# Patient Record
Sex: FEMALE | Race: WHITE | NOT HISPANIC OR LATINO
[De-identification: names, ages, dates, MRNs, and addresses within clinical notes are randomized per-mention and may not be internally consistent; named-entity substitution may affect disease eponyms.]

---

## 2017-03-13 ENCOUNTER — MEDICATION RENEWAL (OUTPATIENT)
Age: 82
End: 2017-03-13

## 2017-04-04 ENCOUNTER — APPOINTMENT (OUTPATIENT)
Dept: PULMONOLOGY | Facility: CLINIC | Age: 82
End: 2017-04-04

## 2017-04-04 VITALS
SYSTOLIC BLOOD PRESSURE: 122 MMHG | WEIGHT: 183 LBS | RESPIRATION RATE: 14 BRPM | OXYGEN SATURATION: 99 % | DIASTOLIC BLOOD PRESSURE: 60 MMHG | BODY MASS INDEX: 31.41 KG/M2

## 2017-04-26 ENCOUNTER — FORM ENCOUNTER (OUTPATIENT)
Age: 82
End: 2017-04-26

## 2017-04-27 ENCOUNTER — OUTPATIENT (OUTPATIENT)
Dept: OUTPATIENT SERVICES | Facility: HOSPITAL | Age: 82
LOS: 1 days | End: 2017-04-27
Payer: MEDICARE

## 2017-04-27 DIAGNOSIS — R06.02 SHORTNESS OF BREATH: ICD-10-CM

## 2017-04-27 PROCEDURE — A9567: CPT

## 2017-04-27 PROCEDURE — 78582 LUNG VENTILAT&PERFUS IMAGING: CPT | Mod: 26

## 2017-04-27 PROCEDURE — 93970 EXTREMITY STUDY: CPT | Mod: 26

## 2017-04-27 PROCEDURE — 71045 X-RAY EXAM CHEST 1 VIEW: CPT

## 2017-04-27 PROCEDURE — A9540: CPT

## 2017-04-27 PROCEDURE — 71010: CPT | Mod: 26

## 2017-04-27 PROCEDURE — 93970 EXTREMITY STUDY: CPT

## 2017-04-27 PROCEDURE — 78582 LUNG VENTILAT&PERFUS IMAGING: CPT

## 2017-07-11 ENCOUNTER — APPOINTMENT (OUTPATIENT)
Dept: PULMONOLOGY | Facility: CLINIC | Age: 82
End: 2017-07-11

## 2017-07-11 VITALS — OXYGEN SATURATION: 98 % | SYSTOLIC BLOOD PRESSURE: 122 MMHG | DIASTOLIC BLOOD PRESSURE: 60 MMHG | HEART RATE: 87 BPM

## 2017-07-11 VITALS — WEIGHT: 178 LBS | BODY MASS INDEX: 30.55 KG/M2

## 2018-01-09 ENCOUNTER — APPOINTMENT (OUTPATIENT)
Dept: PULMONOLOGY | Facility: CLINIC | Age: 83
End: 2018-01-09
Payer: MEDICARE

## 2018-01-09 VITALS
DIASTOLIC BLOOD PRESSURE: 78 MMHG | SYSTOLIC BLOOD PRESSURE: 118 MMHG | RESPIRATION RATE: 16 BRPM | HEART RATE: 91 BPM | OXYGEN SATURATION: 98 %

## 2018-01-09 DIAGNOSIS — I50.42 CHRONIC COMBINED SYSTOLIC (CONGESTIVE) AND DIASTOLIC (CONGESTIVE) HEART FAILURE: ICD-10-CM

## 2018-01-09 DIAGNOSIS — Z86.79 PERSONAL HISTORY OF OTHER DISEASES OF THE CIRCULATORY SYSTEM: ICD-10-CM

## 2018-01-09 PROCEDURE — 94010 BREATHING CAPACITY TEST: CPT

## 2018-01-09 PROCEDURE — 99215 OFFICE O/P EST HI 40 MIN: CPT | Mod: 25

## 2018-01-12 ENCOUNTER — OUTPATIENT (OUTPATIENT)
Dept: OUTPATIENT SERVICES | Facility: HOSPITAL | Age: 83
LOS: 1 days | End: 2018-01-12

## 2018-01-12 DIAGNOSIS — R91.1 SOLITARY PULMONARY NODULE: ICD-10-CM

## 2018-01-19 ENCOUNTER — APPOINTMENT (OUTPATIENT)
Dept: NUCLEAR MEDICINE | Facility: CLINIC | Age: 83
End: 2018-01-19
Payer: MEDICARE

## 2018-01-19 ENCOUNTER — OUTPATIENT (OUTPATIENT)
Dept: OUTPATIENT SERVICES | Facility: HOSPITAL | Age: 83
LOS: 1 days | End: 2018-01-19

## 2018-01-19 DIAGNOSIS — Z00.8 ENCOUNTER FOR OTHER GENERAL EXAMINATION: ICD-10-CM

## 2018-01-19 PROCEDURE — 78815 PET IMAGE W/CT SKULL-THIGH: CPT | Mod: 26,PI

## 2018-01-23 ENCOUNTER — OTHER (OUTPATIENT)
Age: 83
End: 2018-01-23

## 2018-04-05 ENCOUNTER — APPOINTMENT (OUTPATIENT)
Dept: PULMONOLOGY | Facility: CLINIC | Age: 83
End: 2018-04-05
Payer: MEDICARE

## 2018-04-05 VITALS — SYSTOLIC BLOOD PRESSURE: 120 MMHG | OXYGEN SATURATION: 95 % | DIASTOLIC BLOOD PRESSURE: 60 MMHG | HEART RATE: 63 BPM

## 2018-04-05 VITALS — BODY MASS INDEX: 30.64 KG/M2 | WEIGHT: 178.5 LBS

## 2018-04-05 PROCEDURE — 99215 OFFICE O/P EST HI 40 MIN: CPT | Mod: 25

## 2018-04-05 PROCEDURE — 94010 BREATHING CAPACITY TEST: CPT

## 2018-04-05 RX ORDER — DILTIAZEM HYDROCHLORIDE 180 MG/1
180 CAPSULE, EXTENDED RELEASE ORAL
Qty: 90 | Refills: 0 | Status: ACTIVE | COMMUNITY
Start: 2017-05-08

## 2018-04-05 RX ORDER — LATANOPROST/PF 0.005 %
0.01 DROPS OPHTHALMIC (EYE)
Qty: 75 | Refills: 0 | Status: ACTIVE | COMMUNITY
Start: 2017-10-19

## 2018-05-29 ENCOUNTER — EMERGENCY (EMERGENCY)
Facility: HOSPITAL | Age: 83
LOS: 1 days | Discharge: HOME | End: 2018-05-29
Attending: EMERGENCY MEDICINE
Payer: MEDICARE

## 2018-05-29 VITALS
RESPIRATION RATE: 20 BRPM | TEMPERATURE: 99 F | HEART RATE: 110 BPM | OXYGEN SATURATION: 95 % | DIASTOLIC BLOOD PRESSURE: 74 MMHG | SYSTOLIC BLOOD PRESSURE: 150 MMHG

## 2018-05-29 VITALS — WEIGHT: 173.94 LBS | HEIGHT: 64 IN

## 2018-05-29 DIAGNOSIS — Z90.710 ACQUIRED ABSENCE OF BOTH CERVIX AND UTERUS: Chronic | ICD-10-CM

## 2018-05-29 LAB
ALBUMIN SERPL ELPH-MCNC: 4.2 G/DL — SIGNIFICANT CHANGE UP (ref 3.3–5.2)
ALP SERPL-CCNC: 45 U/L — SIGNIFICANT CHANGE UP (ref 40–120)
ALT FLD-CCNC: 7 U/L — SIGNIFICANT CHANGE UP
ANION GAP SERPL CALC-SCNC: 14 MMOL/L — SIGNIFICANT CHANGE UP (ref 5–17)
APTT BLD: 29.4 SEC — SIGNIFICANT CHANGE UP (ref 27.5–37.4)
AST SERPL-CCNC: 18 U/L — SIGNIFICANT CHANGE UP
BASOPHILS # BLD AUTO: 0.1 K/UL — SIGNIFICANT CHANGE UP (ref 0–0.2)
BASOPHILS NFR BLD AUTO: 0.9 % — SIGNIFICANT CHANGE UP (ref 0–2)
BILIRUB SERPL-MCNC: 0.4 MG/DL — SIGNIFICANT CHANGE UP (ref 0.4–2)
BUN SERPL-MCNC: 30 MG/DL — HIGH (ref 8–20)
CALCIUM SERPL-MCNC: 9.7 MG/DL — SIGNIFICANT CHANGE UP (ref 8.6–10.2)
CHLORIDE SERPL-SCNC: 97 MMOL/L — LOW (ref 98–107)
CO2 SERPL-SCNC: 26 MMOL/L — SIGNIFICANT CHANGE UP (ref 22–29)
CREAT SERPL-MCNC: 1.98 MG/DL — HIGH (ref 0.5–1.3)
EOSINOPHIL # BLD AUTO: 0.2 K/UL — SIGNIFICANT CHANGE UP (ref 0–0.5)
EOSINOPHIL NFR BLD AUTO: 1.5 % — SIGNIFICANT CHANGE UP (ref 0–6)
GLUCOSE SERPL-MCNC: 106 MG/DL — SIGNIFICANT CHANGE UP (ref 70–115)
HCT VFR BLD CALC: 36.9 % — LOW (ref 37–47)
HGB BLD-MCNC: 11.8 G/DL — LOW (ref 12–16)
INR BLD: 1.04 RATIO — SIGNIFICANT CHANGE UP (ref 0.88–1.16)
LYMPHOCYTES # BLD AUTO: 1.3 K/UL — SIGNIFICANT CHANGE UP (ref 1–4.8)
LYMPHOCYTES # BLD AUTO: 9.3 % — LOW (ref 20–55)
MCHC RBC-ENTMCNC: 28.8 PG — SIGNIFICANT CHANGE UP (ref 27–31)
MCHC RBC-ENTMCNC: 32 G/DL — SIGNIFICANT CHANGE UP (ref 32–36)
MCV RBC AUTO: 90 FL — SIGNIFICANT CHANGE UP (ref 81–99)
MONOCYTES # BLD AUTO: 1.1 K/UL — HIGH (ref 0–0.8)
MONOCYTES NFR BLD AUTO: 7.4 % — SIGNIFICANT CHANGE UP (ref 3–10)
NEUTROPHILS # BLD AUTO: 11.4 K/UL — HIGH (ref 1.8–8)
NEUTROPHILS NFR BLD AUTO: 80.3 % — HIGH (ref 37–73)
NT-PROBNP SERPL-SCNC: 4826 PG/ML — HIGH (ref 0–300)
PLATELET # BLD AUTO: 362 K/UL — SIGNIFICANT CHANGE UP (ref 150–400)
POTASSIUM SERPL-MCNC: 5 MMOL/L — SIGNIFICANT CHANGE UP (ref 3.5–5.3)
POTASSIUM SERPL-SCNC: 5 MMOL/L — SIGNIFICANT CHANGE UP (ref 3.5–5.3)
PROT SERPL-MCNC: 7.5 G/DL — SIGNIFICANT CHANGE UP (ref 6.6–8.7)
PROTHROM AB SERPL-ACNC: 11.4 SEC — SIGNIFICANT CHANGE UP (ref 9.8–12.7)
RBC # BLD: 4.1 M/UL — LOW (ref 4.4–5.2)
RBC # FLD: 15.7 % — HIGH (ref 11–15.6)
SODIUM SERPL-SCNC: 137 MMOL/L — SIGNIFICANT CHANGE UP (ref 135–145)
TROPONIN T SERPL-MCNC: <0.01 NG/ML — SIGNIFICANT CHANGE UP (ref 0–0.06)
WBC # BLD: 14.3 K/UL — HIGH (ref 4.8–10.8)
WBC # FLD AUTO: 14.3 K/UL — HIGH (ref 4.8–10.8)

## 2018-05-29 PROCEDURE — 80053 COMPREHEN METABOLIC PANEL: CPT

## 2018-05-29 PROCEDURE — 93010 ELECTROCARDIOGRAM REPORT: CPT

## 2018-05-29 PROCEDURE — 99291 CRITICAL CARE FIRST HOUR: CPT

## 2018-05-29 PROCEDURE — 84484 ASSAY OF TROPONIN QUANT: CPT

## 2018-05-29 PROCEDURE — 36415 COLL VENOUS BLD VENIPUNCTURE: CPT

## 2018-05-29 PROCEDURE — 83880 ASSAY OF NATRIURETIC PEPTIDE: CPT

## 2018-05-29 PROCEDURE — 99284 EMERGENCY DEPT VISIT MOD MDM: CPT | Mod: 25

## 2018-05-29 PROCEDURE — 71045 X-RAY EXAM CHEST 1 VIEW: CPT

## 2018-05-29 PROCEDURE — 93005 ELECTROCARDIOGRAM TRACING: CPT

## 2018-05-29 PROCEDURE — 85730 THROMBOPLASTIN TIME PARTIAL: CPT

## 2018-05-29 PROCEDURE — 71045 X-RAY EXAM CHEST 1 VIEW: CPT | Mod: 26

## 2018-05-29 PROCEDURE — 85610 PROTHROMBIN TIME: CPT

## 2018-05-29 PROCEDURE — 96374 THER/PROPH/DIAG INJ IV PUSH: CPT

## 2018-05-29 PROCEDURE — 85027 COMPLETE CBC AUTOMATED: CPT

## 2018-05-29 RX ORDER — APIXABAN 2.5 MG/1
5 TABLET, FILM COATED ORAL EVERY 12 HOURS
Qty: 0 | Refills: 0 | Status: DISCONTINUED | OUTPATIENT
Start: 2018-05-29 | End: 2018-05-29

## 2018-05-29 RX ORDER — APIXABAN 2.5 MG/1
2.5 TABLET, FILM COATED ORAL EVERY 12 HOURS
Qty: 0 | Refills: 0 | Status: DISCONTINUED | OUTPATIENT
Start: 2018-05-29 | End: 2018-06-03

## 2018-05-29 RX ORDER — APIXABAN 2.5 MG/1
1 TABLET, FILM COATED ORAL
Qty: 60 | Refills: 0 | OUTPATIENT
Start: 2018-05-29 | End: 2018-06-27

## 2018-05-29 RX ORDER — FUROSEMIDE 40 MG
40 TABLET ORAL ONCE
Qty: 0 | Refills: 0 | Status: COMPLETED | OUTPATIENT
Start: 2018-05-29 | End: 2018-05-29

## 2018-05-29 RX ADMIN — Medication 40 MILLIGRAM(S): at 11:30

## 2018-05-29 RX ADMIN — APIXABAN 2.5 MILLIGRAM(S): 2.5 TABLET, FILM COATED ORAL at 18:15

## 2018-05-29 NOTE — ED PROVIDER NOTE - CARE PLAN
Principal Discharge DX:	Atrial fibrillation, unspecified type  Secondary Diagnosis:	Acute on chronic congestive heart failure, unspecified heart failure type

## 2018-05-29 NOTE — ED PROVIDER NOTE - OBJECTIVE STATEMENT
82 y/o F, with hx of CHF, COPD, and CAD, presents to the ED c/o shortness of breath, onset 5 days ago.  Pt states that SOB is worse with exertion.  Pt was told by her Pulmonologist to be evaluated in the ED.  Pt also c/o a dry cough.  Pt states cough is worse than usual.  Currently taking 20mg of Lasix every other day.  Denies chest pain, fever, abd pain, N/V/D, or back pain.  Pulmonologist Dr. Pardo.

## 2018-05-29 NOTE — CONSULT NOTE ADULT - SUBJECTIVE AND OBJECTIVE BOX
Cardiology Consult    CHIEF COMPLAINT: Shortness of breath  HISTORY OF PRESENT ILLNESS: MARIA ANTONIA CALLOWAY is a 83y old female with a history of  COPD,renal insufficiency,hyperlipidemia,lung and breast cancer,chronic combined CHF,essential hypertension,AVNRT,coronary disease who presents with shortness of breath over the last 5 days with walking across the room with no associated chest pain or palpitations.She denies recent caffeine or alcohol abuse.    PROBLEM LIST:    PAST MEDICAL HISTORY:COPD,coronary disease,renal insufficiency,hyperlipidemia,AVNRT,essential hypertension,chronic combined CHF    PAST SURGICAL HISTORY:s/p appendectomy,breast surgery,hysterectomy,right ankle fracture,skin cancer,cataract    MEDICATIONS  (STANDING):  apixaban 5 milliGRAM(s) Oral every 12 hours    MEDICATIONS  (PRN):    ALLERGIES:  No Known Allergies    SOCIAL HISTORY:  Tobacco: quit smoking in   Alcohol: no  Drugs: no    FAMILY HISTORY:    REVIEW OF SYSTEMS:  Constitutional: no fatigue, no fevers/chills, no weight change  HEENT: no visual disturbances, no hearing loss  Cardiac: no chest pain, no palpitations, no orthopnea, no PND  Respiratory: had shortness of breath, no cough  GI: no abdominal pain, no blood in the stool, no N/V, no diarrhea  Urological: no dysuria, no hematuria  Hematological: no easy bruising or bleeding  Musculoskeletal: no joint pain, no back pain  Neurological: no headaches, no syncope  Psychiatric: no anxiety, no depression  Skin: no rashes    PHYSICAL EXAM:    T(C): 36.4 (18 @ 15:37), Max: 37 (18 @ 09:21)  T(F): 97.6 (18 @ 15:37), Max: 98.6 (18 @ 09:21)  HR: 101 (18 @ 15:37) (100 - 103)  BP: 144/74 (18 @ 15:37) (116/74 - 144/74)  RR: 22 (18 @ 15:37) (22 - 25)  SpO2: 95% (18 @ 15:37) (94% - 95%)  Wt(kg): --  Telemetry: atrial fibrillation  General: comfortable, in NAD  HEENT: EOMI, normocephalic, atraumatic  Neck: no JVD, no carotid bruits  Heart: +S1S2, 1/6 systolic murmur at the LSB,irregularly irregular, no rubs, no gallops  Lungs: clear to auscultation bilaterally, no wheezes, no rales, no rhonchi  Abdomen: soft, non-tender, non-distended, + bowel sounds  Extremities: no clubbing, no cyanosis, no edema  Vascular: 2+ dorsalis pedis pulses bilaterally  Neuro: A&O x3    EKG: atrial fibrillation    LABS:  Serum Pro-Brain Natriuretic Peptide: 4826 pg/mL ( @ 11:54)    Troponin T, Serum: <0.01 ng/mL ( @ 11:54)                            11.8   14.3  )-----------( 362      ( 29 May 2018 11:54 )             36.9         137  |  97<L>  |  30.0<H>  ----------------------------<  106  5.0   |  26.0  |  1.98<H>    Ca    9.7      29 May 2018 11:54    TPro  7.5  /  Alb  4.2  /  TBili  0.4  /  DBili  x   /  AST  18  /  ALT  7   /  AlkPhos  45      PT/INR - ( 29 May 2018 11:54 )   PT: 11.4 sec;   INR: 1.04 ratio         PTT - ( 29 May 2018 11:54 )  PTT:29.4 sec  RADIOLOGY & ADDITIONAL TESTS:    ASSESSMENT:  MARIA ANTONIA CALLOWAY is a 83y old female with a history of COPD,essential hypertension,renal insufficiency,hyperlipidemia,lung and breast cancer,chronic combined CHF,AVNRT,coronary disease who presented with shortness of breath.She is in a new atrial fibrillation of unknown duration.    Please permit me to suggest the followin. She will not stay and wants to sign out AMA  2.Would start her on Eliquis 2.5mg bid due to her renal insufficiency  3.She was advised to follow up in the office later this week with

## 2018-05-29 NOTE — ED PROVIDER NOTE - PMH
CAD (coronary artery disease)    CHF (congestive heart failure)    COPD (chronic obstructive pulmonary disease)

## 2018-05-29 NOTE — ED ADULT TRIAGE NOTE - CHIEF COMPLAINT QUOTE
sent by pulmonary for MORALES and non productive cough. no chest pain. has CHF, COPD. has dyspnea now

## 2018-05-29 NOTE — ED PROVIDER NOTE - REFUSAL OF SERVICE, MDM
PT UNDERSTANDS THAT SHE IS IN CONGESTIVE HEART FAILURE and has new onset of atrial fibrillation and can get significantly worse or even die and is taking full responsibility for her diascharge. pt's family member was also presents at the time and stated she would bring her back if her sob got worse or if she had chest pain

## 2018-05-29 NOTE — ED STATDOCS - PROGRESS NOTE DETAILS
progressively worsening SOB for the past 5-6 days.  cant walk more than 15 feet; needs to sit and rest.  Went to dr joshua today who came out to the car to examine and recommended coming to ER>  denies chest pian/ pressure, denies weight gain, leg swelling.  Denies fever.  has dry cough.  PE:  visably SOB, chest CTA,.  protocol orders entered and patient re-triaged to main ED for monitoring and treatment.

## 2018-05-29 NOTE — ED PROVIDER NOTE - PROGRESS NOTE DETAILS
pt was seen by dr connors of cardiology and has (new) onset of afib  and CHF but refuses admission and will be discharged AMA on Eliquis to follow up in the office with Dr. Valdez

## 2018-06-12 ENCOUNTER — OUTPATIENT (OUTPATIENT)
Dept: OUTPATIENT SERVICES | Facility: HOSPITAL | Age: 83
LOS: 1 days | End: 2018-06-12
Payer: MEDICARE

## 2018-06-12 DIAGNOSIS — R06.02 SHORTNESS OF BREATH: ICD-10-CM

## 2018-06-12 DIAGNOSIS — Z90.710 ACQUIRED ABSENCE OF BOTH CERVIX AND UTERUS: Chronic | ICD-10-CM

## 2018-06-12 DIAGNOSIS — I48.0 PAROXYSMAL ATRIAL FIBRILLATION: ICD-10-CM

## 2018-06-12 PROCEDURE — 71046 X-RAY EXAM CHEST 2 VIEWS: CPT | Mod: 26

## 2018-06-14 ENCOUNTER — APPOINTMENT (OUTPATIENT)
Dept: PULMONOLOGY | Facility: CLINIC | Age: 83
End: 2018-06-14
Payer: MEDICARE

## 2018-06-14 VITALS — DIASTOLIC BLOOD PRESSURE: 60 MMHG | SYSTOLIC BLOOD PRESSURE: 118 MMHG | OXYGEN SATURATION: 97 % | HEART RATE: 80 BPM

## 2018-06-14 VITALS — BODY MASS INDEX: 30.38 KG/M2 | WEIGHT: 177 LBS

## 2018-06-14 PROCEDURE — 94010 BREATHING CAPACITY TEST: CPT

## 2018-06-14 PROCEDURE — 99214 OFFICE O/P EST MOD 30 MIN: CPT | Mod: 25

## 2018-06-14 RX ORDER — APIXABAN 2.5 MG/1
2.5 TABLET, FILM COATED ORAL
Qty: 60 | Refills: 0 | Status: DISCONTINUED | COMMUNITY
Start: 2018-05-29

## 2018-06-14 RX ORDER — FERROUS GLUCONATE 324(37.5)
324 (37.5 FE) TABLET ORAL
Qty: 180 | Refills: 0 | Status: ACTIVE | COMMUNITY
Start: 2018-05-01

## 2018-06-14 RX ORDER — METOPROLOL SUCCINATE 100 MG/1
100 TABLET, EXTENDED RELEASE ORAL
Qty: 90 | Refills: 0 | Status: DISCONTINUED | COMMUNITY
Start: 2018-06-04

## 2018-06-18 ENCOUNTER — INPATIENT (INPATIENT)
Facility: HOSPITAL | Age: 83
LOS: 3 days | Discharge: HOME | DRG: 811 | End: 2018-06-22
Attending: INTERNAL MEDICINE | Admitting: HOSPITALIST
Payer: MEDICARE

## 2018-06-18 VITALS
WEIGHT: 172.4 LBS | OXYGEN SATURATION: 93 % | HEIGHT: 64 IN | DIASTOLIC BLOOD PRESSURE: 71 MMHG | TEMPERATURE: 98 F | SYSTOLIC BLOOD PRESSURE: 113 MMHG | HEART RATE: 91 BPM | RESPIRATION RATE: 22 BRPM

## 2018-06-18 DIAGNOSIS — Z90.710 ACQUIRED ABSENCE OF BOTH CERVIX AND UTERUS: Chronic | ICD-10-CM

## 2018-06-18 LAB
ALBUMIN SERPL ELPH-MCNC: 3.8 G/DL — SIGNIFICANT CHANGE UP (ref 3.3–5.2)
ALP SERPL-CCNC: 40 U/L — SIGNIFICANT CHANGE UP (ref 40–120)
ALT FLD-CCNC: 9 U/L — SIGNIFICANT CHANGE UP
ANION GAP SERPL CALC-SCNC: 15 MMOL/L — SIGNIFICANT CHANGE UP (ref 5–17)
ANISOCYTOSIS BLD QL: SLIGHT — SIGNIFICANT CHANGE UP
APTT BLD: 30 SEC — SIGNIFICANT CHANGE UP (ref 27.5–37.4)
AST SERPL-CCNC: 18 U/L — SIGNIFICANT CHANGE UP
BASOPHILS # BLD AUTO: 0.1 K/UL — SIGNIFICANT CHANGE UP (ref 0–0.2)
BASOPHILS NFR BLD AUTO: 0.4 % — SIGNIFICANT CHANGE UP (ref 0–2)
BILIRUB SERPL-MCNC: 0.3 MG/DL — LOW (ref 0.4–2)
BLD GP AB SCN SERPL QL: SIGNIFICANT CHANGE UP
BUN SERPL-MCNC: 56 MG/DL — HIGH (ref 8–20)
CALCIUM SERPL-MCNC: 9.5 MG/DL — SIGNIFICANT CHANGE UP (ref 8.6–10.2)
CHLORIDE SERPL-SCNC: 92 MMOL/L — LOW (ref 98–107)
CO2 SERPL-SCNC: 24 MMOL/L — SIGNIFICANT CHANGE UP (ref 22–29)
CREAT SERPL-MCNC: 2.55 MG/DL — HIGH (ref 0.5–1.3)
EOSINOPHIL # BLD AUTO: 0.2 K/UL — SIGNIFICANT CHANGE UP (ref 0–0.5)
EOSINOPHIL NFR BLD AUTO: 0.9 % — SIGNIFICANT CHANGE UP (ref 0–6)
GLUCOSE SERPL-MCNC: 131 MG/DL — HIGH (ref 70–115)
HCT VFR BLD CALC: 21.7 % — LOW (ref 37–47)
HGB BLD-MCNC: 6.7 G/DL — CRITICAL LOW (ref 12–16)
INR BLD: 1.65 RATIO — HIGH (ref 0.88–1.16)
LYMPHOCYTES # BLD AUTO: 1.6 K/UL — SIGNIFICANT CHANGE UP (ref 1–4.8)
LYMPHOCYTES # BLD AUTO: 9.1 % — LOW (ref 20–55)
MACROCYTES BLD QL: SLIGHT — SIGNIFICANT CHANGE UP
MCHC RBC-ENTMCNC: 29.1 PG — SIGNIFICANT CHANGE UP (ref 27–31)
MCHC RBC-ENTMCNC: 30.9 G/DL — LOW (ref 32–36)
MCV RBC AUTO: 94.3 FL — SIGNIFICANT CHANGE UP (ref 81–99)
MICROCYTES BLD QL: SLIGHT — SIGNIFICANT CHANGE UP
MONOCYTES # BLD AUTO: 1.3 K/UL — HIGH (ref 0–0.8)
MONOCYTES NFR BLD AUTO: 7.4 % — SIGNIFICANT CHANGE UP (ref 3–10)
NEUTROPHILS # BLD AUTO: 13.9 K/UL — HIGH (ref 1.8–8)
NEUTROPHILS NFR BLD AUTO: 80.9 % — HIGH (ref 37–73)
OVALOCYTES BLD QL SMEAR: SLIGHT — SIGNIFICANT CHANGE UP
PLAT MORPH BLD: NORMAL — SIGNIFICANT CHANGE UP
PLATELET # BLD AUTO: 365 K/UL — SIGNIFICANT CHANGE UP (ref 150–400)
POIKILOCYTOSIS BLD QL AUTO: SLIGHT — SIGNIFICANT CHANGE UP
POLYCHROMASIA BLD QL SMEAR: SLIGHT — SIGNIFICANT CHANGE UP
POTASSIUM SERPL-MCNC: 4.5 MMOL/L — SIGNIFICANT CHANGE UP (ref 3.5–5.3)
POTASSIUM SERPL-SCNC: 4.5 MMOL/L — SIGNIFICANT CHANGE UP (ref 3.5–5.3)
PROT SERPL-MCNC: 6.6 G/DL — SIGNIFICANT CHANGE UP (ref 6.6–8.7)
PROTHROM AB SERPL-ACNC: 18.3 SEC — HIGH (ref 9.8–12.7)
RBC # BLD: 2.3 M/UL — LOW (ref 4.4–5.2)
RBC # FLD: 20.7 % — HIGH (ref 11–15.6)
RBC BLD AUTO: ABNORMAL
SODIUM SERPL-SCNC: 131 MMOL/L — LOW (ref 135–145)
TYPE + AB SCN PNL BLD: SIGNIFICANT CHANGE UP
WBC # BLD: 17.2 K/UL — HIGH (ref 4.8–10.8)
WBC # FLD AUTO: 17.2 K/UL — HIGH (ref 4.8–10.8)

## 2018-06-18 PROCEDURE — 99285 EMERGENCY DEPT VISIT HI MDM: CPT

## 2018-06-18 NOTE — ED ADULT TRIAGE NOTE - CHIEF COMPLAINT QUOTE
"I was told to come here because my hemoglobin is low". Pt had BW done last Friday and received her results today. Pt is pale and noted to be SOB upon triage. Pt is taking Eliquis and has a recent diagnosis of A-Fib, states her stool is sometimes very dark because she takes Iron supplements.

## 2018-06-18 NOTE — ED PROVIDER NOTE - PMH
Afib    CAD (coronary artery disease)    CHF (congestive heart failure)    COPD (chronic obstructive pulmonary disease)

## 2018-06-18 NOTE — ED ADULT NURSE NOTE - OBJECTIVE STATEMENT
Received patient in CDU2L, a&ox4, able to make needs know. Patient was told to go to ER by her kidney doctor because her hemoglobin is low. Patient  received with left forearm iv, iv site without redness or swelling, patent.  Patient denies any pain or discomfort at this time. Patient denies chest pain, sob, dizziness, lightheadedness and headache. Patient not in distress. To continue to monitor. Patient with no active bleeding at this time.

## 2018-06-18 NOTE — ED PROVIDER NOTE - OBJECTIVE STATEMENT
82 y/o F pt with hx of Afib, CAD, CHF, COPD and hysterectomy presents to ED c/o SOB that began a last month. Pt recently saw her nephrologist and had routine labs drawn. She was called tonight and told to come to the ED for low Hgb but she does not know the exact value. Pt states SOB has been worsening over the last 4 days. She was recently admitted to hospital for new onset Afib last month and was placed on Eliquis. She has been having dark stools but states she is on iron supplements. She does not use O2 at home. Pt states she is in kidney failure but is not on dialysis. denies fever. denies HA or neck pain. no chest pain. no abd pain. no n/v/d. no urinary f/u/d. no back pain. no motor or sensory deficits. denies illicit drug use. no recent travel. no rash. no other acute issues symptoms or concerns   PMD: Dunphy  Nephrology: Melani  Cardiology: José Miguel  Pulmonology: Michele

## 2018-06-19 DIAGNOSIS — I50.9 HEART FAILURE, UNSPECIFIED: ICD-10-CM

## 2018-06-19 DIAGNOSIS — D64.9 ANEMIA, UNSPECIFIED: ICD-10-CM

## 2018-06-19 DIAGNOSIS — I25.10 ATHEROSCLEROTIC HEART DISEASE OF NATIVE CORONARY ARTERY WITHOUT ANGINA PECTORIS: ICD-10-CM

## 2018-06-19 DIAGNOSIS — N18.4 CHRONIC KIDNEY DISEASE, STAGE 4 (SEVERE): ICD-10-CM

## 2018-06-19 DIAGNOSIS — D72.829 ELEVATED WHITE BLOOD CELL COUNT, UNSPECIFIED: ICD-10-CM

## 2018-06-19 DIAGNOSIS — I48.2 CHRONIC ATRIAL FIBRILLATION: ICD-10-CM

## 2018-06-19 DIAGNOSIS — R06.02 SHORTNESS OF BREATH: ICD-10-CM

## 2018-06-19 DIAGNOSIS — K92.2 GASTROINTESTINAL HEMORRHAGE, UNSPECIFIED: ICD-10-CM

## 2018-06-19 DIAGNOSIS — D50.0 IRON DEFICIENCY ANEMIA SECONDARY TO BLOOD LOSS (CHRONIC): ICD-10-CM

## 2018-06-19 LAB
FERRITIN SERPL-MCNC: 262 NG/ML — HIGH (ref 15–150)
HCT VFR BLD CALC: 30.3 % — LOW (ref 37–47)
HCT VFR BLD CALC: 33.8 % — LOW (ref 37–47)
HGB BLD-MCNC: 10.9 G/DL — LOW (ref 12–16)
HGB BLD-MCNC: 9.9 G/DL — LOW (ref 12–16)
IRON SATN MFR SERPL: 160 UG/DL — HIGH (ref 37–145)
IRON SATN MFR SERPL: 35 % — SIGNIFICANT CHANGE UP (ref 14–50)
MCHC RBC-ENTMCNC: 29.5 PG — SIGNIFICANT CHANGE UP (ref 27–31)
MCHC RBC-ENTMCNC: 32.7 G/DL — SIGNIFICANT CHANGE UP (ref 32–36)
MCV RBC AUTO: 90.2 FL — SIGNIFICANT CHANGE UP (ref 81–99)
NEUTROPHILS NFR BLD AUTO: 85.4 % — HIGH (ref 37–73)
OB PNL STL: POSITIVE
PLATELET # BLD AUTO: 324 K/UL — SIGNIFICANT CHANGE UP (ref 150–400)
RBC # BLD: 3.36 M/UL — LOW (ref 4.4–5.2)
RBC # FLD: 20.5 % — HIGH (ref 11–15.6)
TIBC SERPL-MCNC: 452 UG/DL — HIGH (ref 220–430)
TRANSFERRIN SERPL-MCNC: 316 MG/DL — SIGNIFICANT CHANGE UP (ref 192–382)
WBC # BLD: 18.2 K/UL — HIGH (ref 4.8–10.8)
WBC # FLD AUTO: 18.2 K/UL — HIGH (ref 4.8–10.8)

## 2018-06-19 PROCEDURE — 12345: CPT | Mod: NC

## 2018-06-19 PROCEDURE — 93010 ELECTROCARDIOGRAM REPORT: CPT

## 2018-06-19 PROCEDURE — 99223 1ST HOSP IP/OBS HIGH 75: CPT

## 2018-06-19 PROCEDURE — 71045 X-RAY EXAM CHEST 1 VIEW: CPT | Mod: 26

## 2018-06-19 PROCEDURE — 93306 TTE W/DOPPLER COMPLETE: CPT | Mod: 26

## 2018-06-19 RX ORDER — FUROSEMIDE 40 MG
40 TABLET ORAL DAILY
Qty: 0 | Refills: 0 | Status: DISCONTINUED | OUTPATIENT
Start: 2018-06-19 | End: 2018-06-20

## 2018-06-19 RX ORDER — DILTIAZEM HCL 120 MG
180 CAPSULE, EXT RELEASE 24 HR ORAL DAILY
Qty: 0 | Refills: 0 | Status: DISCONTINUED | OUTPATIENT
Start: 2018-06-19 | End: 2018-06-22

## 2018-06-19 RX ORDER — METOPROLOL TARTRATE 50 MG
25 TABLET ORAL
Qty: 0 | Refills: 0 | Status: DISCONTINUED | OUTPATIENT
Start: 2018-06-19 | End: 2018-06-22

## 2018-06-19 RX ORDER — FENOFIBRATE,MICRONIZED 130 MG
145 CAPSULE ORAL DAILY
Qty: 0 | Refills: 0 | Status: DISCONTINUED | OUTPATIENT
Start: 2018-06-19 | End: 2018-06-22

## 2018-06-19 RX ORDER — PANTOPRAZOLE SODIUM 20 MG/1
40 TABLET, DELAYED RELEASE ORAL DAILY
Qty: 0 | Refills: 0 | Status: DISCONTINUED | OUTPATIENT
Start: 2018-06-19 | End: 2018-06-20

## 2018-06-19 RX ORDER — IPRATROPIUM/ALBUTEROL SULFATE 18-103MCG
3 AEROSOL WITH ADAPTER (GRAM) INHALATION EVERY 6 HOURS
Qty: 0 | Refills: 0 | Status: DISCONTINUED | OUTPATIENT
Start: 2018-06-19 | End: 2018-06-22

## 2018-06-19 RX ORDER — IPRATROPIUM/ALBUTEROL SULFATE 18-103MCG
3 AEROSOL WITH ADAPTER (GRAM) INHALATION ONCE
Qty: 0 | Refills: 0 | Status: COMPLETED | OUTPATIENT
Start: 2018-06-19 | End: 2018-06-19

## 2018-06-19 RX ORDER — PANTOPRAZOLE SODIUM 20 MG/1
8 TABLET, DELAYED RELEASE ORAL
Qty: 80 | Refills: 0 | Status: DISCONTINUED | OUTPATIENT
Start: 2018-06-19 | End: 2018-06-19

## 2018-06-19 RX ADMIN — Medication 3 MILLILITER(S): at 04:04

## 2018-06-19 RX ADMIN — Medication 180 MILLIGRAM(S): at 08:23

## 2018-06-19 RX ADMIN — PANTOPRAZOLE SODIUM 40 MILLIGRAM(S): 20 TABLET, DELAYED RELEASE ORAL at 11:32

## 2018-06-19 RX ADMIN — Medication 145 MILLIGRAM(S): at 11:34

## 2018-06-19 RX ADMIN — Medication 3 MILLILITER(S): at 21:27

## 2018-06-19 RX ADMIN — Medication 40 MILLIGRAM(S): at 09:04

## 2018-06-19 RX ADMIN — Medication 3 MILLILITER(S): at 11:34

## 2018-06-19 RX ADMIN — Medication 125 MILLIGRAM(S): at 11:34

## 2018-06-19 RX ADMIN — Medication 25 MILLIGRAM(S): at 19:36

## 2018-06-19 NOTE — PROGRESS NOTE ADULT - PROBLEM SELECTOR PLAN 8
unclear if psot transfusion vs COPD. will give solumedrol x 1 dose and nebs. already on iv lasix. monitor on oxygen.

## 2018-06-19 NOTE — ED ADULT NURSE REASSESSMENT NOTE - NS ED NURSE REASSESS COMMENT FT1
As per Dr. Pickett at bedside, hold off 2nd unit of prbc, give 1 duoneb neb treatment now and do EKG.

## 2018-06-19 NOTE — CONSULT NOTE ADULT - PROBLEM SELECTOR RECOMMENDATION 9
Possibly secondary to iron deficiency. Transfuse to Hb greater than 8 grams. No clinical evidence of active GI bleeding. Iron studies ordered. Can feed pt and change Pantoprazole to IV daily. Repeat labs ordered for the AM.

## 2018-06-19 NOTE — H&P ADULT - HISTORY OF PRESENT ILLNESS
82 y/o female with h/o COPD, CHF, CAD, CRI, who was diagnosed on 5/29 with a new onset of A Fib and was started on Eliquis. was called by Dr Polo office (nephrologist ) to go to the ER because hemoglobin is 6.7. patient did not see blood in the urine or stools but attributes the dark stools to iron pills. patient c/o increased SOB since friday that the patient feels out of breath on walking few steps. no chest pain but h/o left lung nodules with follow up with oncologist. h/o excision of righht lung cancer and right breast cancer s/p right lumpectomy. 82 y/o female with h/o COPD, CHF, CAD, CRI, who was diagnosed on 5/29 with a new onset of A Fib and was started on Eliquis. was called by Dr Polo office (nephrologist ) to go to the ER because hemoglobin is 6.7. patient did not see blood in the urine or stools but attributes the dark stools to iron pills. patient c/o increased SOB since friday that the patient feels out of breath on walking few steps. no chest pain but h/o left lung nodules with follow up with oncologist. h/o excision of righht lung cancer and right breast cancer s/p right lumpectomy. FOB: positive

## 2018-06-19 NOTE — ED ADULT NURSE REASSESSMENT NOTE - NS ED NURSE REASSESS COMMENT FT1
spoke to echo dept, they are aware no CLARY. they will call back when ready with RN to watch during procedure.

## 2018-06-19 NOTE — ED ADULT NURSE REASSESSMENT NOTE - NS ED NURSE REASSESS COMMENT FT1
pt a+ox,3, sitting up in bed c/o SOB. pt reports sudden onset of SOB and feeling like she cannot catch her breath since coming to ED. audible wheezing present, pt tachypneic at 24, 02 sat 100% on 2L NC. MD Pickett made aware and will come to bedside for eval.

## 2018-06-19 NOTE — ED ADULT NURSE REASSESSMENT NOTE - NS ED NURSE REASSESS COMMENT FT1
Assumed patient this morning, patient A&Ox3, second unit of PRBCS infusing. NO distress, chart and medications reviewed. Patient refusing diuretics at this time. VSS, Patient admitted, awaiting bed assignment,

## 2018-06-20 DIAGNOSIS — D64.9 ANEMIA, UNSPECIFIED: ICD-10-CM

## 2018-06-20 DIAGNOSIS — I50.43 ACUTE ON CHRONIC COMBINED SYSTOLIC (CONGESTIVE) AND DIASTOLIC (CONGESTIVE) HEART FAILURE: ICD-10-CM

## 2018-06-20 LAB
ANION GAP SERPL CALC-SCNC: 17 MMOL/L — SIGNIFICANT CHANGE UP (ref 5–17)
ANISOCYTOSIS BLD QL: SIGNIFICANT CHANGE UP
APPEARANCE UR: CLEAR — SIGNIFICANT CHANGE UP
BILIRUB UR-MCNC: NEGATIVE — SIGNIFICANT CHANGE UP
BUN SERPL-MCNC: 54 MG/DL — HIGH (ref 8–20)
CALCIUM SERPL-MCNC: 8.8 MG/DL — SIGNIFICANT CHANGE UP (ref 8.6–10.2)
CHLORIDE SERPL-SCNC: 97 MMOL/L — LOW (ref 98–107)
CO2 SERPL-SCNC: 23 MMOL/L — SIGNIFICANT CHANGE UP (ref 22–29)
COLOR SPEC: YELLOW — SIGNIFICANT CHANGE UP
CREAT SERPL-MCNC: 2.26 MG/DL — HIGH (ref 0.5–1.3)
DIFF PNL FLD: ABNORMAL
EPI CELLS # UR: SIGNIFICANT CHANGE UP
GLUCOSE SERPL-MCNC: 213 MG/DL — HIGH (ref 70–115)
GLUCOSE UR QL: 50 MG/DL
HCT VFR BLD CALC: 30.4 % — LOW (ref 37–47)
HGB BLD-MCNC: 9.6 G/DL — LOW (ref 12–16)
HYPOCHROMIA BLD QL: SLIGHT — SIGNIFICANT CHANGE UP
INR BLD: 1.26 RATIO — HIGH (ref 0.88–1.16)
KETONES UR-MCNC: NEGATIVE — SIGNIFICANT CHANGE UP
LEUKOCYTE ESTERASE UR-ACNC: NEGATIVE — SIGNIFICANT CHANGE UP
LYMPHOCYTES # BLD AUTO: 0.4 K/UL — LOW (ref 1–4.8)
LYMPHOCYTES # BLD AUTO: 3 % — LOW (ref 20–55)
MCHC RBC-ENTMCNC: 28.9 PG — SIGNIFICANT CHANGE UP (ref 27–31)
MCHC RBC-ENTMCNC: 31.6 G/DL — LOW (ref 32–36)
MCV RBC AUTO: 91.6 FL — SIGNIFICANT CHANGE UP (ref 81–99)
MONOCYTES # BLD AUTO: 0.4 K/UL — SIGNIFICANT CHANGE UP (ref 0–0.8)
MONOCYTES NFR BLD AUTO: 2 % — LOW (ref 3–10)
NEUTROPHILS # BLD AUTO: 17.4 K/UL — HIGH (ref 1.8–8)
NEUTROPHILS NFR BLD AUTO: 95 % — HIGH (ref 37–73)
NITRITE UR-MCNC: NEGATIVE — SIGNIFICANT CHANGE UP
OVALOCYTES BLD QL SMEAR: SLIGHT — SIGNIFICANT CHANGE UP
PH UR: 6 — SIGNIFICANT CHANGE UP (ref 5–8)
PLAT MORPH BLD: NORMAL — SIGNIFICANT CHANGE UP
PLATELET # BLD AUTO: 322 K/UL — SIGNIFICANT CHANGE UP (ref 150–400)
POIKILOCYTOSIS BLD QL AUTO: SLIGHT — SIGNIFICANT CHANGE UP
POLYCHROMASIA BLD QL SMEAR: SLIGHT — SIGNIFICANT CHANGE UP
POTASSIUM SERPL-MCNC: 4.3 MMOL/L — SIGNIFICANT CHANGE UP (ref 3.5–5.3)
POTASSIUM SERPL-SCNC: 4.3 MMOL/L — SIGNIFICANT CHANGE UP (ref 3.5–5.3)
PROT UR-MCNC: 100 MG/DL
PROTHROM AB SERPL-ACNC: 13.9 SEC — HIGH (ref 9.8–12.7)
RBC # BLD: 3.32 M/UL — LOW (ref 4.4–5.2)
RBC # FLD: 21 % — HIGH (ref 11–15.6)
RBC BLD AUTO: ABNORMAL
RBC CASTS # UR COMP ASSIST: SIGNIFICANT CHANGE UP /HPF (ref 0–4)
SODIUM SERPL-SCNC: 137 MMOL/L — SIGNIFICANT CHANGE UP (ref 135–145)
SP GR SPEC: 1.02 — SIGNIFICANT CHANGE UP (ref 1.01–1.02)
UROBILINOGEN FLD QL: NEGATIVE MG/DL — SIGNIFICANT CHANGE UP
WBC # BLD: 18.4 K/UL — HIGH (ref 4.8–10.8)
WBC # FLD AUTO: 18.4 K/UL — HIGH (ref 4.8–10.8)
WBC UR QL: SIGNIFICANT CHANGE UP /HPF (ref 0–5)

## 2018-06-20 PROCEDURE — 99232 SBSQ HOSP IP/OBS MODERATE 35: CPT

## 2018-06-20 PROCEDURE — 99233 SBSQ HOSP IP/OBS HIGH 50: CPT

## 2018-06-20 RX ORDER — FERROUS SULFATE 325(65) MG
325 TABLET ORAL DAILY
Qty: 0 | Refills: 0 | Status: DISCONTINUED | OUTPATIENT
Start: 2018-06-20 | End: 2018-06-22

## 2018-06-20 RX ORDER — PANTOPRAZOLE SODIUM 20 MG/1
40 TABLET, DELAYED RELEASE ORAL
Qty: 0 | Refills: 0 | Status: DISCONTINUED | OUTPATIENT
Start: 2018-06-20 | End: 2018-06-22

## 2018-06-20 RX ORDER — LOSARTAN POTASSIUM 100 MG/1
1 TABLET, FILM COATED ORAL
Qty: 0 | Refills: 0 | COMMUNITY

## 2018-06-20 RX ORDER — DILTIAZEM HCL 120 MG
180 CAPSULE, EXT RELEASE 24 HR ORAL AT BEDTIME
Qty: 0 | Refills: 0 | Status: DISCONTINUED | OUTPATIENT
Start: 2018-06-20 | End: 2018-06-22

## 2018-06-20 RX ORDER — FUROSEMIDE 40 MG
40 TABLET ORAL
Qty: 0 | Refills: 0 | Status: DISCONTINUED | OUTPATIENT
Start: 2018-06-20 | End: 2018-06-21

## 2018-06-20 RX ORDER — LATANOPROST 0.05 MG/ML
1 SOLUTION/ DROPS OPHTHALMIC; TOPICAL AT BEDTIME
Qty: 0 | Refills: 0 | Status: DISCONTINUED | OUTPATIENT
Start: 2018-06-20 | End: 2018-06-22

## 2018-06-20 RX ADMIN — Medication 3 MILLILITER(S): at 20:51

## 2018-06-20 RX ADMIN — PANTOPRAZOLE SODIUM 40 MILLIGRAM(S): 20 TABLET, DELAYED RELEASE ORAL at 21:48

## 2018-06-20 RX ADMIN — Medication 25 MILLIGRAM(S): at 17:10

## 2018-06-20 RX ADMIN — Medication 3 MILLILITER(S): at 09:32

## 2018-06-20 RX ADMIN — PANTOPRAZOLE SODIUM 40 MILLIGRAM(S): 20 TABLET, DELAYED RELEASE ORAL at 11:36

## 2018-06-20 RX ADMIN — LATANOPROST 1 DROP(S): 0.05 SOLUTION/ DROPS OPHTHALMIC; TOPICAL at 21:47

## 2018-06-20 RX ADMIN — Medication 25 MILLIGRAM(S): at 06:16

## 2018-06-20 RX ADMIN — Medication 180 MILLIGRAM(S): at 21:47

## 2018-06-20 RX ADMIN — Medication 180 MILLIGRAM(S): at 06:17

## 2018-06-20 RX ADMIN — Medication 40 MILLIGRAM(S): at 11:36

## 2018-06-20 RX ADMIN — Medication 3 MILLILITER(S): at 15:01

## 2018-06-20 RX ADMIN — Medication 325 MILLIGRAM(S): at 11:37

## 2018-06-20 RX ADMIN — Medication 40 MILLIGRAM(S): at 21:47

## 2018-06-20 RX ADMIN — Medication 145 MILLIGRAM(S): at 11:37

## 2018-06-20 NOTE — CONSULT NOTE ADULT - ASSESSMENT
CHET on CKD suspect 2/2 hypoperfusion from acute bleed  rise in cr may also be 2/2 diuretics h/o diastolic HF  - agree w diuretics may need to tolerate some degree of azotemia  - hold ACEi/ARB    Anemia on Elequis (now on  hold) s/p two units prbc h/h better iron stores ok  Has h/o R lung Ca and R breast Ca would avoid KELLIE use  GI workup ongoing  -Should she need EGD/colonoscopy she is cleared from a renal standpoint    Will follow

## 2018-06-20 NOTE — CONSULT NOTE ADULT - SUBJECTIVE AND OBJECTIVE BOX
HPI:  82 y/o female with h/o COPD, CHF, CAD, CRI, who was diagnosed on  with a new onset of A Fib and was started on Eliquis. was called by Dr Polo office (nephrologist ) to go to the ER because hemoglobin is 6.7. patient did not see blood in the urine or stools but attributes the dark stools to iron pills. patient c/o increased SOB since friday that the patient feels out of breath on walking few steps. no chest pain but h/o left lung nodules with follow up with oncologist. h/o excision of R lung cancer and R breast cancer s/p right lumpectomy. FOB: positive.  given prbc, Cr 2.5 baseline  approx cr 2.1 (3/2018- office)  ROS +weakness +MORALES denies CP/N/V/D      PAST MEDICAL & SURGICAL HISTORY:  Afib  CAD (coronary artery disease)  CHF (congestive heart failure)  COPD (chronic obstructive pulmonary disease)  H/O: hysterectomy   DM 2, [place], hypothyroidism, prior DVT and IVC filter; Cholecystectomy    FAMILY HISTORY:  No pertinent family history in first degree relatives  [place]    Social History:Non smoker    MEDICATIONS  (STANDING):  ALBUTerol/ipratropium for Nebulization 3 milliLiter(s) Nebulizer every 6 hours  diltiazem    milliGRAM(s) Oral daily  diltiazem    milliGRAM(s) Oral at bedtime  fenofibrate Tablet 145 milliGRAM(s) Oral daily  ferrous    sulfate 325 milliGRAM(s) Oral daily  furosemide   Injectable 40 milliGRAM(s) IV Push two times a day  latanoprost 0.005% Ophthalmic Solution 1 Drop(s) Both EYES at bedtime  methylPREDNISolone sodium succinate Injectable 40 milliGRAM(s) IV Push every 8 hours  metoprolol tartrate 25 milliGRAM(s) Oral two times a day  pantoprazole  Injectable 40 milliGRAM(s) IV Push daily    MEDICATIONS  (PRN):   Meds reviewed    Allergies    No Known Allergies    Vital Signs Last 24 Hrs  T(C): 36.8 (2018 08:27), Max: 37.1 (2018 04:50)  T(F): 98.2 (2018 08:27), Max: 98.8 (2018 04:50)  HR: 87 (2018 09:35) (82 - 110)  BP: 129/69 (2018 08:27) (113/60 - 143/68)  BP(mean): --  RR: 20 (2018 08:27) (20 - 22)  SpO2: 95% (2018 09:35) (95% - 100%)  Daily     Daily     PHYSICAL EXAM:    GENERAL: appears chronically ill  HEAD:  Atraumatic, Normocephalic  EYES: EOMI  NECK: Supple, neck  veins full  NERVOUS SYSTEM:  Alert & Oriented X3  CHEST/LUNG: Clear to percussion bilaterally no rales  HEART: Regular rate and rhythm; No murmur  ABDOMEN: Soft, Nontender, Nondistended; Bowel sounds present  EXTREMITIES:  trace edema      LABS:                        9.6    18.4  )-----------( 322      ( 2018 09:44 )             30.4     06-18    131<L>  |  92<L>  |  56.0<H>  ----------------------------<  131<H>  4.5   |  24.0  |  2.55<H>    Ca    9.5      2018 22:06    TPro  6.6  /  Alb  3.8  /  TBili  0.3<L>  /  DBili  x   /  AST  18  /  ALT  9   /  AlkPhos  40  06-18    PT/INR - ( 2018 09:44 )   PT: 13.9 sec;   INR: 1.26 ratio         PTT - ( 2018 22:06 )  PTT:30.0 sec  Urinalysis Basic - ( 2018 08:17 )    Color: Yellow / Appearance: Clear / S.020 / pH: x  Gluc: x / Ketone: Negative  / Bili: Negative / Urobili: Negative mg/dL   Blood: x / Protein: 100 mg/dL / Nitrite: Negative   Leuk Esterase: Negative / RBC: 0-2 /HPF / WBC 0-3 /HPF   Sq Epi: x / Non Sq Epi: Few / Bacteria: x              RADIOLOGY & ADDITIONAL TESTS:
Cardiology Consult    CHIEF COMPLAINT: Anemia    HISTORY OF PRESENT ILLNESS: MARIA ANTONIA CALLOWAY is a 83y old female with a history of COPD,PAF,lung and breast cancer,renal insufficiency,essential hypertension,PVD,MR,cardiomyopathy,chronic combined CHF who presents with anemia and a Hb of 6.7 on outpatient labs.She has been more short of breath than usual with walking and denies chest pain or palpitations.Her stools have been dark with no diane bleeding noted.    PROBLEM LIST:  Symptomatic anemia  Chronic renal insufficiency, stage 4 (severe)  Coronary artery disease involving native coronary artery of native heart without angina pectoris  Chronic atrial fibrillation  Acute on chronic congestive heart failure, unspecified heart failure type  Anemia due to blood loss  Upper GI bleed    PAST MEDICAL HISTORY:  Afib  CAD (coronary artery disease)  CHF (congestive heart failure)  COPD (chronic obstructive pulmonary disease)    PAST SURGICAL HISTORY:  H/O: hysterectomy    MEDICATIONS  (STANDING):  diltiazem    milliGRAM(s) Oral daily  fenofibrate Tablet 145 milliGRAM(s) Oral daily  furosemide   Injectable 40 milliGRAM(s) IV Push daily  metoprolol tartrate 25 milliGRAM(s) Oral two times a day  pantoprazole  Injectable 40 milliGRAM(s) IV Push daily    MEDICATIONS  (PRN):    ALLERGIES:  No Known Allergies    SOCIAL HISTORY:  Tobacco:quit in   Alcohol: no  Drugs: no    FAMILY HISTORY:  No pertinent family history in first degree relatives    REVIEW OF SYSTEMS:  Constitutional: no fatigue, no fevers/chills, no weight change  HEENT: no visual disturbances, no hearing loss  Cardiac: no chest pain, no palpitations, no orthopnea, no PND  Respiratory: has shortness of breath, no cough  GI: no abdominal pain, has dark stool, no N/V, no diarrhea  Urological: no dysuria, no hematuria  Hematological: no easy bruising or bleeding  Musculoskeletal: no joint pain, no back pain  Neurological: no headaches, no syncope  Psychiatric: no anxiety, no depression  Skin: no rashes    PHYSICAL EXAM:    T(C): 36.5 (18 @ 07:44), Max: 36.8 (18 @ 20:04)  T(F): 97.7 (18 @ 07:44), Max: 98.3 (18 @ 20:04)  HR: 108 (18 @ 07:44) (76 - 110)  BP: 145/68 (18 @ 07:44) (113/71 - 145/68)  RR: 22 (18 @ 07:44) (19 - 24)  SpO2: 100% (18 @ 07:44) (93% - 100%)  Wt(kg): --  Telemetry: atrial fibrillation  General: comfortable, in NAD  HEENT: EOMI, normocephalic, atraumatic  Neck: no JVD, no carotid bruits  Heart: +S1S2,irregularly irregular, 1/6 systolic murmur at the LSB, no rubs, no gallops  Lungs: clear to auscultation bilaterally, no wheezes, no rales, no rhonchi  Abdomen: soft, non-tender, non-distended, + bowel sounds  Extremities: no clubbing, no cyanosis, no edema  Vascular: 2+ dorsalis pedis pulses bilaterally  Neuro: A&O x3    EKG: atrial fibrillation,LAD,IVCD    LABS:                              6.7    17.2  )-----------( 365      ( 2018 22:06 )             21.7     06-18    131<L>  |  92<L>  |  56.0<H>  ----------------------------<  131<H>  4.5   |  24.0  |  2.55<H>    Ca    9.5      2018 22:06    TPro  6.6  /  Alb  3.8  /  TBili  0.3<L>  /  DBili  x   /  AST  18  /  ALT  9   /  AlkPhos  40  06-18    PT/INR - ( 2018 22:06 )   PT: 18.3 sec;   INR: 1.65 ratio         PTT - ( 2018 22:06 )  PTT:30.0 sec  RADIOLOGY & ADDITIONAL TESTS:    ASSESSMENT:  MARIA ANTONIA CALLOWAY is a 83y old female with a history of COPD,PAF,lung and breast cancer,renal insufficiency,essential hypertension,MR,PVD,cardiomyopathy,chronic combined CHF who presented with anemia.She is presently in the ER receiving 2 units of PRBC's and has been seen by GI.    Please permit me to suggest the followin. Eliquis is being held for suspected GI bleed  2.Continue treatment as per GI  3.Will check echo for LV function  4.Continue with Metoprolol and Cardizem for rate control
Patient is a 83y old  Female who presents with a chief complaint of low hemoglobin and SOB (19 Jun 2018 05:46)      HPI:  82 y/o female with h/o COPD, CHF, CAD, CRI, who was diagnosed on 5/29 with a new onset of A Fib and was started on Eliquis. was called by Dr Polo office (nephrologist ) to go to the ER because hemoglobin is 6.7. patient did not see blood in the urine or stools but attributes the dark stools to iron pills. patient c/o increased SOB since friday that the patient feels out of breath on walking few steps. no chest pain but h/o left lung nodules with follow up with oncologist. h/o excision of right lung cancer and right breast cancer s/p right lumpectomy. FOB: positive (19 Jun 2018 05:46). Pt denies gross hematochezia or melena or N/V or hematemesis. She states that her last colonoscopy was in 2005 and was normal and she has had no previous EGD's. No previous h/o anemia      REVIEW OF SYSTEMS:  Constitutional: No fever, weight loss or fatigue  ENMT:  No difficulty hearing, tinnitus, vertigo; No sinus or throat pain  Respiratory: Exertional and non exertional SOB. No cough, wheezing, chills or hemoptysis  Cardiovascular: No chest pain, palpitations, dizziness or leg swelling  Gastrointestinal: No abdominal or epigastric pain. No nausea, vomiting or hematemesis; No diarrhea or constipation. No melena or hematochezia.  Skin: No itching, burning, rashes or lesions   Musculoskeletal: No joint pain or swelling; No muscle, back or extremity pain  Patient has no cardiopulmonary, peripheral vascular, musculoskeletal, dermatological, neurological, gynecological or psychological symptoms or complaints at this time.    PAST MEDICAL & SURGICAL HISTORY:  Afib  CAD (coronary artery disease)  CHF (congestive heart failure)  COPD (chronic obstructive pulmonary disease)  H/O: hysterectomy      FAMILY HISTORY:  No pertinent family history in first degree relatives      SOCIAL HISTORY:  Smoking Status: [ ] Current, [ x] Former, [ ] Never  Pack Years: No ETOH or drug abuse history.    MEDICATIONS:  MEDICATIONS  (STANDING):  diltiazem    milliGRAM(s) Oral daily  fenofibrate Tablet 145 milliGRAM(s) Oral daily  furosemide   Injectable 40 milliGRAM(s) IV Push daily  metoprolol tartrate 25 milliGRAM(s) Oral two times a day  pantoprazole Infusion 8 mG/Hr (10 mL/Hr) IV Continuous <Continuous>    MEDICATIONS  (PRN):      Allergies    No Known Allergies    Intolerances        Vital Signs Last 24 Hrs  T(C): 36.6 (19 Jun 2018 05:56), Max: 36.8 (18 Jun 2018 20:04)  T(F): 97.9 (19 Jun 2018 05:56), Max: 98.3 (18 Jun 2018 20:04)  HR: 98 (19 Jun 2018 05:56) (76 - 110)  BP: 120/55 (19 Jun 2018 05:56) (113/71 - 136/77)  BP(mean): --  RR: 19 (19 Jun 2018 05:56) (19 - 24)  SpO2: 100% (19 Jun 2018 05:56) (93% - 100%)        PHYSICAL EXAM:    General: Well developed; well nourished; tachypneic at rest when seen.  HEENT: MMM, conjunctiva pale and sclera anicteric.  Lungs: Decreased breath sounds bilaterally.  CoR: RRR S1. S2 only.  Abdomen: Soft, non-tender non-distended; Normal bowel sounds; No rebound or guarding or HSM. Large non-reduceable, non tender ventral hernia, no HSM.  RYANN: Brown stool.  Extremities: Normal range of motion, No clubbing, cyanosis or edema  Neurological: Alert and oriented x3  Skin: Warm and dry. No obvious rash      LABS:                        6.7    17.2  )-----------( 365      ( 18 Jun 2018 22:06 )             21.7     06-18    131<L>  |  92<L>  |  56.0<H>  ----------------------------<  131<H>  4.5   |  24.0  |  2.55<H>    Ca    9.5      18 Jun 2018 22:06    TPro  6.6  /  Alb  3.8  /  TBili  0.3<L>  /  DBili  x   /  AST  18  /  ALT  9   /  AlkPhos  40  06-18          RADIOLOGY & ADDITIONAL STUDIES:

## 2018-06-20 NOTE — PROGRESS NOTE ADULT - PROBLEM SELECTOR PLAN 3
now with Loyda superimposed. with mild improvement. however she is on diuretics and some azotemia is permissible. monitor renal fxn. now with Loyda superimposed. with mild improvement. however she is on diuretics and some azotemia is permissible. monitor renal fxn. ARB on hold

## 2018-06-21 ENCOUNTER — TRANSCRIPTION ENCOUNTER (OUTPATIENT)
Age: 83
End: 2018-06-21

## 2018-06-21 DIAGNOSIS — D50.9 IRON DEFICIENCY ANEMIA, UNSPECIFIED: ICD-10-CM

## 2018-06-21 PROCEDURE — 99233 SBSQ HOSP IP/OBS HIGH 50: CPT

## 2018-06-21 RX ORDER — FUROSEMIDE 40 MG
1 TABLET ORAL
Qty: 0 | Refills: 0 | COMMUNITY

## 2018-06-21 RX ORDER — PANTOPRAZOLE SODIUM 20 MG/1
1 TABLET, DELAYED RELEASE ORAL
Qty: 40 | Refills: 0 | OUTPATIENT
Start: 2018-06-21 | End: 2018-07-10

## 2018-06-21 RX ORDER — METOPROLOL TARTRATE 50 MG
25 TABLET ORAL
Qty: 0 | Refills: 0 | COMMUNITY

## 2018-06-21 RX ADMIN — Medication 25 MILLIGRAM(S): at 17:44

## 2018-06-21 RX ADMIN — Medication 325 MILLIGRAM(S): at 14:35

## 2018-06-21 RX ADMIN — LATANOPROST 1 DROP(S): 0.05 SOLUTION/ DROPS OPHTHALMIC; TOPICAL at 22:07

## 2018-06-21 RX ADMIN — Medication 25 MILLIGRAM(S): at 05:14

## 2018-06-21 RX ADMIN — Medication 3 MILLILITER(S): at 09:07

## 2018-06-21 RX ADMIN — Medication 3 MILLILITER(S): at 03:42

## 2018-06-21 RX ADMIN — PANTOPRAZOLE SODIUM 40 MILLIGRAM(S): 20 TABLET, DELAYED RELEASE ORAL at 05:14

## 2018-06-21 RX ADMIN — PANTOPRAZOLE SODIUM 40 MILLIGRAM(S): 20 TABLET, DELAYED RELEASE ORAL at 17:44

## 2018-06-21 RX ADMIN — Medication 180 MILLIGRAM(S): at 05:14

## 2018-06-21 RX ADMIN — Medication 3 MILLILITER(S): at 20:56

## 2018-06-21 RX ADMIN — Medication 180 MILLIGRAM(S): at 22:07

## 2018-06-21 RX ADMIN — Medication 40 MILLIGRAM(S): at 05:14

## 2018-06-21 RX ADMIN — Medication 40 MILLIGRAM(S): at 14:36

## 2018-06-21 RX ADMIN — Medication 3 MILLILITER(S): at 15:19

## 2018-06-21 RX ADMIN — Medication 145 MILLIGRAM(S): at 14:36

## 2018-06-21 NOTE — PROGRESS NOTE ADULT - PROBLEM SELECTOR PLAN 1
Pt. is not a suitable candidate for GI W/u because of her tenuous respiratory status. When discharged she should go home on Pantoprazole 40 mg./d. and iron tablets TID. Signing off. Reconsult as needed. Thank you.
Cardizem increased for better rate control. hold Eliquis due to BT requiring anemia. Cardiology consult appreciated. outpatient decision about when to restart eliquis.
suspected. diet advanced. protonix iv. GI consult appreciated. Hold Eliquis and aspirin. await both GI and Cardiology to decide about A/C and aspirin
Cardizem increased for better rate control. hold Eliquis due to BT requiring anemia. Cardiology consult appreciated

## 2018-06-21 NOTE — PROGRESS NOTE ADULT - PROBLEM SELECTOR PROBLEM 5
Coronary artery disease involving native coronary artery of native heart without angina pectoris
SOB (shortness of breath)
SOB (shortness of breath)

## 2018-06-21 NOTE — PROGRESS NOTE ADULT - PROBLEM SELECTOR PLAN 4
Cardizem. hold Eliquis. Cardiology consult: Adalberto
presumed reactive to anemia. will monitor. blood cultures ordered.
presumed reactive to anemia. will monitor. blood cultures ordered.

## 2018-06-21 NOTE — PROGRESS NOTE ADULT - PROBLEM SELECTOR PROBLEM 2
Anemia due to blood loss
Coronary artery disease involving native coronary artery of native heart without angina pectoris
Coronary artery disease involving native coronary artery of native heart without angina pectoris

## 2018-06-21 NOTE — PROGRESS NOTE ADULT - PROBLEM SELECTOR PLAN 7
lasix bid IV. I/Os. daily wts. fluid restriction.   per cardiology combined chf  per [facility] EF good and grd 1 diastolic dysfxn
lasix bid IV. I/Os. daily wts. fluid restriction.   per cardiology combined chf  per [facility] EF good and grd 1 diastolic dysfxn
presumed reactive to anemia. will monitor. if not trending down will do blood cultures.

## 2018-06-21 NOTE — PROGRESS NOTE ADULT - PROBLEM SELECTOR PROBLEM 1
Iron deficiency anemia, unspecified iron deficiency anemia type
Chronic atrial fibrillation
Upper GI bleed
Chronic atrial fibrillation

## 2018-06-21 NOTE — DISCHARGE NOTE ADULT - PATIENT PORTAL LINK FT
You can access the [facility] Patient Portal, offered by [facility], by registering with the following website: [email or web address]

## 2018-06-21 NOTE — DISCHARGE NOTE ADULT - PLAN OF CARE
resolving well follow with PMD and cardiology in 1 week eliquis on hold. to be assessed by cardiology in office s/p 2 prbcs. cannot give aranesp. iron stores good.

## 2018-06-21 NOTE — PROGRESS NOTE ADULT - PROBLEM SELECTOR PLAN 6
FOBT positive on iron supplements. per GI not a candidate for sedation. ALso no overt GI bleeding. known AOCD sec to CKD stg 4. iron stores adequate. aranesp contraindicated sec to h/o cancer. s/p 2 PRBCs H/H better.
FOBT positive on iron supplements. per GI not a candidate for sedation. ALso no overt GI bleeding. known AOCD sec to CKD stg 4. iron stores adequate. aranesp contraindicated sec to h/o cancer. s/p 2 PRBCs H/H better.
now with Loyda superimposed. likely sec to anemia. monitor renal fxn for improvement post PRBC. avoid nephrotoxics and so losartan held. will resume from tomorrow.

## 2018-06-21 NOTE — PROGRESS NOTE ADULT - PROBLEM SELECTOR PLAN 3
now with CHET superimposed. with mild improvement. however she is on diuretics and some azotemia is permissible. monitor renal fxn. ARB on hold

## 2018-06-21 NOTE — PROGRESS NOTE ADULT - PROBLEM SELECTOR PROBLEM 3
Acute on chronic congestive heart failure, unspecified heart failure type
Chronic renal insufficiency, stage 4 (severe)
Chronic renal insufficiency, stage 4 (severe)

## 2018-06-21 NOTE — PROGRESS NOTE ADULT - PROBLEM SELECTOR PLAN 5
Metoprolol. will hold aspirin for acute GI bleed
unclear if post transfusion vs COPD. being treated as both acute on chronic combined chf and copd exacerbation. lasix iv bid and solumedrol. hypoxic without oxygen. reassess home O2 needs post treatment. quick taper of steroids and change IV lasix to torsemide today. Home O2 needs assessment.
unclear if post transfusion vs COPD. being treated as both acute on chronic combined chf and copd exacerbation. lasix iv bid and solumedrol. hypoxic without oxygen. reassess home O2 needs post treatment.

## 2018-06-21 NOTE — PROGRESS NOTE ADULT - PROBLEM SELECTOR PLAN 2
Metoprolol.  aspirin on hold for severe anemia. outpatient resumption of ASA
vs AOCD given CKD. transfused p RBC. f/u H&H
Metoprolol.  aspirin on hold for severe anemia

## 2018-06-21 NOTE — DISCHARGE NOTE ADULT - CARE PROVIDER_API CALL
Dunphy, Ronald (DO), Family Medicine  [address] [place] [address]  Phone: [phone]  Fax: [phone]

## 2018-06-21 NOTE — DISCHARGE NOTE ADULT - CARE PLAN
Principal Discharge DX:	Acute on chronic congestive heart failure, unspecified heart failure type  Goal:	resolving well  Assessment and plan of treatment:	follow with PMD and cardiology in 1 week  Secondary Diagnosis:	Atrial fibrillation  Assessment and plan of treatment:	eliquis on hold. to be assessed by cardiology in office  Secondary Diagnosis:	COPD (chronic obstructive pulmonary disease)  Secondary Diagnosis:	Symptomatic anemia  Assessment and plan of treatment:	s/p 2 prbcs. cannot give aranesp. iron stores good.

## 2018-06-21 NOTE — PROGRESS NOTE ADULT - PROBLEM SELECTOR PROBLEM 7
Acute on chronic combined systolic and diastolic congestive heart failure
Acute on chronic combined systolic and diastolic congestive heart failure
Leucocytosis

## 2018-06-21 NOTE — DISCHARGE NOTE ADULT - MEDICATION SUMMARY - MEDICATIONS TO TAKE
I will START or STAY ON the medications listed below when I get home from the hospital:    predniSONE 10 mg oral tablet  -- 4 tab(s) by mouth once a day adn reduce by 1 tab every day until finish  -- It is very important that you take or use this exactly as directed.  Do not skip doses or discontinue unless directed by your doctor.  Obtain medical advice before taking any non-prescription drugs as some may affect the action of this medication.  Take with food or milk.    -- Indication: For Copd    aspirin 81 mg oral tablet  -- 1 tab(s) by mouth once a day  -- Indication: For Cad    losartan 50 mg oral tablet  -- 1 tab(s) by mouth once a day  -- Indication: For htn    dilTIAZem 180 mg/24 hours oral capsule, extended release  -- orally once a day (at bedtime)  -- Indication: For Afib    fenofibrate 134 mg oral capsule  -- 1 cap(s) by mouth once a day  -- Indication: For hld    metoprolol  -- 25 milligram(s) by mouth 2 times a day  -- Indication: For Cad    Ventolin HFA 90 mcg/inh inhalation aerosol  -- 2 puff(s) inhaled 4 times a day  -- Indication: For Copd    torsemide 5 mg oral tablet  -- 1 tab(s) by mouth once a day  -- Indication: For Chf    ferrous gluconate 325 mg (36 mg elemental iron) oral tablet  -- Indication: For Anemia    latanoprost 0.005% ophthalmic solution  -- 1 drop(s) to each affected eye once a day (in the evening)  -- Indication: For glaucoma    pantoprazole 40 mg oral delayed release tablet  -- 1 tab(s) by mouth 2 times a day  -- Indication: For gerd I will START or STAY ON the medications listed below when I get home from the hospital:    predniSONE 10 mg oral tablet  -- 4 tab(s) by mouth once a day adn reduce by 1 tab every day until finish  -- It is very important that you take or use this exactly as directed.  Do not skip doses or discontinue unless directed by your doctor.  Obtain medical advice before taking any non-prescription drugs as some may affect the action of this medication.  Take with food or milk.    -- Indication: For COPD (chronic obstructive pulmonary disease)    aspirin 81 mg oral tablet  -- 1 tab(s) by mouth once a day  -- Indication: For Cad    losartan 50 mg oral tablet  -- 1 tab(s) by mouth once a day  -- Indication: For htn    dilTIAZem 180 mg/24 hours oral capsule, extended release  -- orally once a day (at bedtime)  -- Indication: For Afib    fenofibrate 134 mg oral capsule  -- 1 cap(s) by mouth once a day  -- Indication: For hld    metoprolol  -- 25 milligram(s) by mouth 2 times a day  -- Indication: For Cad    Ventolin HFA 90 mcg/inh inhalation aerosol  -- 2 puff(s) inhaled 4 times a day  -- Indication: For Copd    torsemide 5 mg oral tablet  -- 1 tab(s) by mouth once a day  -- Indication: For Chf    ferrous gluconate 325 mg (36 mg elemental iron) oral tablet  -- Indication: For Anemia    latanoprost 0.005% ophthalmic solution  -- 1 drop(s) to each affected eye once a day (in the evening)  -- Indication: For glaucoma    pantoprazole 40 mg oral delayed release tablet  -- 1 tab(s) by mouth 2 times a day  -- Indication: For Anemia

## 2018-06-21 NOTE — DISCHARGE NOTE ADULT - MEDICATION SUMMARY - MEDICATIONS TO STOP TAKING
I will STOP taking the medications listed below when I get home from the hospital:    Lasix 40 mg oral tablet  -- 1 tab(s) by mouth once a day    aspirin 81 mg oral tablet  -- 1 tab(s) by mouth once a day    Eliquis 2.5 mg oral tablet  -- 1 tab(s) by mouth 2 times a day   -- Check with your doctor before becoming pregnant.  It is very important that you take or use this exactly as directed.  Do not skip doses or discontinue unless directed by your doctor.  Obtain medical advice before taking any non-prescription drugs as some may affect the action of this medication.

## 2018-06-21 NOTE — DISCHARGE NOTE ADULT - HOSPITAL COURSE
82 y/o female with h/o COPD, CHF, CAD, CRI, who was diagnosed on 5/29 with a new onset of A Fib and was started on Eliquis. was called by Dr Polo office (nephrologist ) to go to the ER because hemoglobin is 6.7. patient did not see blood in the urine or stools but attributes the dark stools to iron pills. patient c/o increased SOB since friday that the patient feels out of breath on walking few steps. no chest pain but h/o left lung nodules with follow up with oncologist. h/o excision of right lung cancer and right breast cancer s/p right lumpectomy. FOB: positive    likely acute on chr AOCD anemia requiring PRBC x 2. eliquis and aspirin held for now until outpatient f/u with cardiology. initial afib rvr now rate controlled. cannot give EPO sec to cancer history and iron stores good. no overt bleed. post transfusion diastolic chf suspected and treated with diuretics. ef good. but also noted copd exxacerbation and treated with steroids with quick taper.    Medically stable and agreeable with discharge and follow up plan. Patient was advised to return to ED if any symptoms occur or worsen.    TIme 45 mins 84 y/o female with h/o COPD, CHF, CAD, CRI, who was diagnosed on 5/29 with a new onset of A Fib and was started on Eliquis. was called by Dr Polo office (nephrologist ) to go to the ER because hemoglobin is 6.7. patient did not see blood in the urine or stools but attributes the dark stools to iron pills. patient c/o increased SOB since friday that the patient feels out of breath on walking few steps. no chest pain but h/o left lung nodules with follow up with oncologist. h/o excision of right lung cancer and right breast cancer s/p right lumpectomy. FOB: positive    likely acute on chr AOCD anemia requiring PRBC x 2. eliquis and aspirin held for now until outpatient f/u with cardiology. initial afib rvr now rate controlled. cannot give EPO sec to cancer history and iron stores good. no overt bleed. post transfusion diastolic chf suspected and treated with diuretics. ef good. but also noted copd exxacerbation and treated with steroids with quick taper. worsenign of creatinine sec to aggressive diuresis while inpatient. follow up BMP otupatient.    ICU Vital Signs Last 24 Hrs  T(C): 36.9 (22 Jun 2018 09:13), Max: 36.9 (22 Jun 2018 09:13)  T(F): 98.5 (22 Jun 2018 09:13), Max: 98.5 (22 Jun 2018 09:13)  HR: 110 (22 Jun 2018 09:13) (93 - 117)  BP: 118/61 (22 Jun 2018 09:13) (113/51 - 120/66)  BP(mean): --  ABP: --  ABP(mean): --  RR: 20 (22 Jun 2018 09:13) (18 - 20)  SpO2: 98% (22 Jun 2018 08:28) (95% - 99%)  GENERAL: well-groomed, well-developed  HEAD:  Atraumatic, Normocephalic  EYES: EOMI, PERRLA, conjunctiva and sclera clear  ENMT: Moist mucous membranes,   NECK: Supple, No JVD, Normal thyroid  NERVOUS SYSTEM:  Alert & Oriented X 3, Motor Strength 5/5 B/L upper and lower extremities  CHEST/LUNG: Clear to auscultate bilaterally; No rales, rhonchi, wheezing, or rubs  HEART: IRRegular rate and rhythm; No murmurs, rubs, or gallops  ABDOMEN: Soft, Nontender, Nondistended; Bowel sounds present  EXTREMITIES:  2+ Peripheral Pulses, No clubbing, cyanosis, or edema  LYMPH: No lymphadenopathy noted  SKIN: No rashes or lesions    Medically stable and agreeable with discharge and follow up plan. Patient was advised to return to ED if any symptoms occur or worsen.    TIme 45 mins

## 2018-06-22 VITALS
SYSTOLIC BLOOD PRESSURE: 127 MMHG | HEART RATE: 103 BPM | RESPIRATION RATE: 20 BRPM | TEMPERATURE: 98 F | DIASTOLIC BLOOD PRESSURE: 63 MMHG

## 2018-06-22 LAB
ANION GAP SERPL CALC-SCNC: 16 MMOL/L — SIGNIFICANT CHANGE UP (ref 5–17)
BUN SERPL-MCNC: 70 MG/DL — HIGH (ref 8–20)
CALCIUM SERPL-MCNC: 8.7 MG/DL — SIGNIFICANT CHANGE UP (ref 8.6–10.2)
CHLORIDE SERPL-SCNC: 90 MMOL/L — LOW (ref 98–107)
CO2 SERPL-SCNC: 25 MMOL/L — SIGNIFICANT CHANGE UP (ref 22–29)
CREAT SERPL-MCNC: 2.22 MG/DL — HIGH (ref 0.5–1.3)
GLUCOSE SERPL-MCNC: 228 MG/DL — HIGH (ref 70–115)
POTASSIUM SERPL-MCNC: 4.5 MMOL/L — SIGNIFICANT CHANGE UP (ref 3.5–5.3)
POTASSIUM SERPL-SCNC: 4.5 MMOL/L — SIGNIFICANT CHANGE UP (ref 3.5–5.3)
SODIUM SERPL-SCNC: 131 MMOL/L — LOW (ref 135–145)

## 2018-06-22 PROCEDURE — 85018 HEMOGLOBIN: CPT

## 2018-06-22 PROCEDURE — 36415 COLL VENOUS BLD VENIPUNCTURE: CPT

## 2018-06-22 PROCEDURE — 86901 BLOOD TYPING SEROLOGIC RH(D): CPT

## 2018-06-22 PROCEDURE — 84466 ASSAY OF TRANSFERRIN: CPT

## 2018-06-22 PROCEDURE — 99285 EMERGENCY DEPT VISIT HI MDM: CPT | Mod: 25

## 2018-06-22 PROCEDURE — 85027 COMPLETE CBC AUTOMATED: CPT

## 2018-06-22 PROCEDURE — 81001 URINALYSIS AUTO W/SCOPE: CPT

## 2018-06-22 PROCEDURE — 85014 HEMATOCRIT: CPT

## 2018-06-22 PROCEDURE — 71045 X-RAY EXAM CHEST 1 VIEW: CPT

## 2018-06-22 PROCEDURE — 86900 BLOOD TYPING SEROLOGIC ABO: CPT

## 2018-06-22 PROCEDURE — 80053 COMPREHEN METABOLIC PANEL: CPT

## 2018-06-22 PROCEDURE — 80048 BASIC METABOLIC PNL TOTAL CA: CPT

## 2018-06-22 PROCEDURE — 85730 THROMBOPLASTIN TIME PARTIAL: CPT

## 2018-06-22 PROCEDURE — 82728 ASSAY OF FERRITIN: CPT

## 2018-06-22 PROCEDURE — 93306 TTE W/DOPPLER COMPLETE: CPT

## 2018-06-22 PROCEDURE — 86923 COMPATIBILITY TEST ELECTRIC: CPT

## 2018-06-22 PROCEDURE — 93005 ELECTROCARDIOGRAM TRACING: CPT

## 2018-06-22 PROCEDURE — 36430 TRANSFUSION BLD/BLD COMPNT: CPT

## 2018-06-22 PROCEDURE — 87040 BLOOD CULTURE FOR BACTERIA: CPT

## 2018-06-22 PROCEDURE — 99239 HOSP IP/OBS DSCHRG MGMT >30: CPT

## 2018-06-22 PROCEDURE — 86850 RBC ANTIBODY SCREEN: CPT

## 2018-06-22 PROCEDURE — 82272 OCCULT BLD FECES 1-3 TESTS: CPT

## 2018-06-22 PROCEDURE — 83550 IRON BINDING TEST: CPT

## 2018-06-22 PROCEDURE — 85610 PROTHROMBIN TIME: CPT

## 2018-06-22 PROCEDURE — P9016: CPT

## 2018-06-22 PROCEDURE — 94640 AIRWAY INHALATION TREATMENT: CPT

## 2018-06-22 RX ORDER — PANTOPRAZOLE SODIUM 20 MG/1
1 TABLET, DELAYED RELEASE ORAL
Qty: 0 | Refills: 0 | COMMUNITY
Start: 2018-06-22 | End: 2018-07-11

## 2018-06-22 RX ORDER — PANTOPRAZOLE SODIUM 20 MG/1
1 TABLET, DELAYED RELEASE ORAL
Qty: 40 | Refills: 0 | OUTPATIENT
Start: 2018-06-22 | End: 2018-07-11

## 2018-06-22 RX ADMIN — Medication 40 MILLIGRAM(S): at 11:28

## 2018-06-22 RX ADMIN — Medication 145 MILLIGRAM(S): at 11:28

## 2018-06-22 RX ADMIN — Medication 40 MILLIGRAM(S): at 02:07

## 2018-06-22 RX ADMIN — Medication 5 MILLIGRAM(S): at 06:00

## 2018-06-22 RX ADMIN — Medication 180 MILLIGRAM(S): at 06:01

## 2018-06-22 RX ADMIN — Medication 3 MILLILITER(S): at 03:27

## 2018-06-22 RX ADMIN — Medication 325 MILLIGRAM(S): at 11:28

## 2018-06-22 RX ADMIN — PANTOPRAZOLE SODIUM 40 MILLIGRAM(S): 20 TABLET, DELAYED RELEASE ORAL at 06:01

## 2018-06-22 RX ADMIN — Medication 25 MILLIGRAM(S): at 06:01

## 2018-06-22 RX ADMIN — Medication 3 MILLILITER(S): at 08:26

## 2018-06-22 NOTE — PROGRESS NOTE ADULT - ASSESSMENT
Patient with severe dyspnea and anemia. No overt GI bleeding. Continue to hold AC. Patient is not a candidate for the endoscopic sedation. Will not perform any procedures. Change PPI to oral bid.
82 y/o female with upper GI bleed, acute anemia, CHF exacerbation, A fib, CRI
82 y/o female with upper GI bleed, acute anemia, CHF exacerbation, A fib, CRI
CKD - creat slowly improving  Nyponatremia - watch ; may need to take off Protonix if worse  Anemia - watch Hb
82 y/o female with upper GI bleed, acute anemia, CHF exacerbation, A fib, CRI

## 2018-06-22 NOTE — PROGRESS NOTE ADULT - PROVIDER SPECIALTY LIST ADULT
Cardiology
Gastroenterology
Gastroenterology
Internal Medicine
Internal Medicine
Nephrology
Nephrology
Internal Medicine

## 2018-06-22 NOTE — PROGRESS NOTE ADULT - SUBJECTIVE AND OBJECTIVE BOX
Cardiology Follow-up    MARIA ANTONIA CALLOWAY was seen and examined. No events overnight. She is still complaining of SOB which is however better than it was on presentation. She denies any fevers/chills, chest pain, palpitations, or abdominal pain.    PROBLEM LIST:  SOB (shortness of breath)  Leucocytosis  Symptomatic anemia  Chronic renal insufficiency, stage 4 (severe)  Coronary artery disease involving native coronary artery of native heart without angina pectoris  Chronic atrial fibrillation  Acute on chronic congestive heart failure, unspecified heart failure type  Anemia due to blood loss  Upper GI bleed    PAST MEDICAL HISTORY:  Afib  CAD (coronary artery disease)  CHF (congestive heart failure)  COPD (chronic obstructive pulmonary disease)    PAST SURGICAL HISTORY:  H/O: hysterectomy    MEDICATIONS  (STANDING):  ALBUTerol/ipratropium for Nebulization 3 milliLiter(s) Nebulizer every 6 hours  diltiazem    milliGRAM(s) Oral daily  fenofibrate Tablet 145 milliGRAM(s) Oral daily  furosemide   Injectable 40 milliGRAM(s) IV Push daily  metoprolol tartrate 25 milliGRAM(s) Oral two times a day  pantoprazole  Injectable 40 milliGRAM(s) IV Push daily    MEDICATIONS  (PRN):    ALLERGIES:  No Known Allergies    I&O's Summary    PHYSICAL EXAM:  T(F): 98.2 (18 @ 08:27), Max: 98.8 (18 @ 04:50)  HR: 98 (18 @ 08:27) (82 - 110)  BP: 129/69 (18 @ 08:27) (113/60 - 143/68)  RR: 20 (18 @ 08:27) (20 - 22)  SpO2: 98% (18 @ 08:27) (98% - 100%)  Wt(kg): --  Weight (kg): 78.2 ( @ 20:04)    General: comfortable, in NAD  Heart: +S1S2, irregularly irregular, 1/6 systolic murmur at the LLSB, no rubs, no gallops  Lungs: inspiratory crackles at the bases bilaterally, no wheezes, no rhonchi  Abdomen: soft, non-tender, + bowel sounds, large hernia in RLQ  Extremities: no clubbing, no cyanosis, no edema  Neuro: A&O x3    LABS:          CBC:            10.9   --    >-----------< --      @ 18:08            33.8               9.9    18.2  >-----------< 324    06-19 @ 10:49            30.3               6.7    17.2  >-----------< 365    06-18 @ 22:06            21.7       CHEMISTRY:   131   |  92     |  56.0   ----------------------------< 131     06-18 @ 22:06    4.5   |  24.0   |  2.55     eGFR non-  17     eGFR   19         TPro --    / Alb 3.8   / TBili 0.3   / DBili --    / AST 18    / ALT 9     / AlkPhos --      @ 22:06    PT/INR - ( 2018 22:06 )   PT: 18.3 sec;   INR: 1.65 ratio         PTT - ( 2018 22:06 )  PTT:30.0 sec    RADIOLOGY & ADDITIONAL TESTS:  CXR: Mild congestive heart failure. Superimposed pneumonia cannot be excluded.    Echo:   1. Normal biventricular systolic function. Left ventricular ejection   fraction, by visual estimation, is 50-55%. There is akinesis of the mid   to basal inferior and basal to mid inferoseptum.   2. Spectral Doppler shows impaired relaxation pattern of left   ventricular myocardial filling (Grade I diastolic dysfunction).   3. Mild aortic insufficiency.   4. Mild mitral regurgitation.   5. No pericardial effusion.   6. Right sided pleural effusion.      ASSESSMENT:  MARIA ANTONIA CALLOWAY is a 83y old female with a history of CAD s/p IWMI due to distal occlusion of the LCX, chronic combined systolic and diastolic heart failure (EF previously 45-50%), PVD s/p left iliac artery stent, essential hypertension, mixed hyperlipidemia, paroxysmal AFib, PVCs, COPD and CKD stage 4 who presented with acute blood loss anemia.      Please permit me to suggest the followin. The patient's EF appears to have improved to 50-55% however she is in acute on chronic diastolic heart failure at this time. Continue beta blockade. No ACE/ARB secondary to renal dysfunction.  2. AFib is not adequately rate-controlled. I will increase Cardizem CD to 180 mg 2x daily. Continue lopressor 25 mg 2x daily. I will increase Lasix to 40 mg IV 2x daily since she is still SOB at rest. Her baseline creatinine is in the 2.0-2.1 range so we will have to monitor this closely.  3. Given that she dropped her H/H significantly on Eliquis, anticoagulation should be held for now. She may eventually be a candidate for LA appendage closure however this requires bridging anticoagulation that the patient could not tolerate at this time.  4. Should an EGD/colonoscopy be required, she is cleared from a cardiac viewpoint.
Cardiology Follow-up    MARIA ANTONIA CALLOWAY was seen and examined. No events overnight. The patient denies any chest pain, palpitations, orthopnea, PND or abdominal pain.She has shortness of breath with activity and is on nasal oxygen.    PROBLEM LIST:  Iron deficiency anemia, unspecified iron deficiency anemia type  Acute on chronic combined systolic and diastolic congestive heart failure  Anemia  SOB (shortness of breath)  Leucocytosis  Symptomatic anemia  Chronic renal insufficiency, stage 4 (severe)  Coronary artery disease involving native coronary artery of native heart without angina pectoris  Chronic atrial fibrillation  Acute on chronic congestive heart failure, unspecified heart failure type  Anemia due to blood loss  Upper GI bleed    PAST MEDICAL HISTORY:  Afib  CAD (coronary artery disease)  CHF (congestive heart failure)  COPD (chronic obstructive pulmonary disease)    PAST SURGICAL HISTORY:  H/O: hysterectomy    MEDICATIONS  (STANDING):  ALBUTerol/ipratropium for Nebulization 3 milliLiter(s) Nebulizer every 6 hours  diltiazem    milliGRAM(s) Oral daily  diltiazem    milliGRAM(s) Oral at bedtime  fenofibrate Tablet 145 milliGRAM(s) Oral daily  ferrous    sulfate 325 milliGRAM(s) Oral daily  furosemide   Injectable 40 milliGRAM(s) IV Push two times a day  latanoprost 0.005% Ophthalmic Solution 1 Drop(s) Both EYES at bedtime  methylPREDNISolone sodium succinate Injectable 40 milliGRAM(s) IV Push every 8 hours  metoprolol tartrate 25 milliGRAM(s) Oral two times a day  pantoprazole    Tablet 40 milliGRAM(s) Oral two times a day    MEDICATIONS  (PRN):    ALLERGIES:  No Known Allergies    PHYSICAL EXAM:  T(C): 36.9 (18 @ 08:22), Max: 36.9 (18 @ 08:22)  T(F): 98.4 (18 @ 08:22), Max: 98.4 (18 @ 08:22)  HR: 89 (18 @ 08:22) (89 - 111)  BP: 131/70 (18 @ 08:22) (110/62 - 136/74)  RR: 19 (18 @ 08:22) (18 - 20)  SpO2: 98% (18 @ 03:43) (96% - 100%)  Wt(kg): --  I&O's Summary    2018 07:01  -  2018 07:00  --------------------------------------------------------  IN: 0 mL / OUT: 1300 mL / NET: -1300 mL        General: comfortable, in NAD  Heart: +S1S2, 1/6 systolic murmur at the LLSB,irregularly irregular, no rubs, no gallops  Lungs: clear to auscultation bilaterally, no wheezes, no rales, no rhonchi  Abdomen: soft, non-tender, non-distended, + bowel sounds  Extremities: no clubbing, no cyanosis, no edema  Neuro: A&O x3    LABS:                              9.6    18.4  )-----------( 322      ( 2018 09:44 )             30.4     06-20    137  |  97<L>  |  54.0<H>  ----------------------------<  213<H>  4.3   |  23.0  |  2.26<H>    Ca    8.8      2018 09:44      PT/INR - ( 2018 09:44 )   PT: 13.9 sec;   INR: 1.26 ratio           RADIOLOGY & ADDITIONAL TESTS:    ASSESSMENT:  MARIA ANTONIA CALLOWAY is a 83y old female with a history of COPD,chronic atrial fibrillation,chronic renal insufficiency,iron deficiency anemia,coronary disease who presented with anemia,shortness of breath.Her Hb is improved post transfusion.    Please permit me to suggest the followin. GI to treat patient medically as she is too high a risk for endoscopy  2.Will keep off Eliquis for now but will need to consider restarting it when she is more stable  3.Would check a room air oxygen saturation as she may be a candidate for home oxygen  4.Her IV Lasix can be switched to Torsemide 20mg bid
Cardiology Follow-up    MARIA ANTONIA CALLOWAY was seen and examined. No events overnight. The patient denies any chest pain, palpitations, orthopnea, PND or abdominal pain.She has shortness of breath with exertion.She is presently receiving a respiratory treatment.    PROBLEM LIST:  Iron deficiency anemia, unspecified iron deficiency anemia type  Acute on chronic combined systolic and diastolic congestive heart failure  Anemia  SOB (shortness of breath)  Leucocytosis  Symptomatic anemia  Chronic renal insufficiency, stage 4 (severe)  Coronary artery disease involving native coronary artery of native heart without angina pectoris  Chronic atrial fibrillation  Acute on chronic congestive heart failure, unspecified heart failure type  Anemia due to blood loss  Upper GI bleed    PAST MEDICAL HISTORY:  Afib  CAD (coronary artery disease)  CHF (congestive heart failure)  COPD (chronic obstructive pulmonary disease)    PAST SURGICAL HISTORY:  H/O: hysterectomy    MEDICATIONS  (STANDING):  ALBUTerol/ipratropium for Nebulization 3 milliLiter(s) Nebulizer every 6 hours  diltiazem    milliGRAM(s) Oral daily  diltiazem    milliGRAM(s) Oral at bedtime  fenofibrate Tablet 145 milliGRAM(s) Oral daily  ferrous    sulfate 325 milliGRAM(s) Oral daily  latanoprost 0.005% Ophthalmic Solution 1 Drop(s) Both EYES at bedtime  methylPREDNISolone sodium succinate Injectable 40 milliGRAM(s) IV Push every 12 hours  metoprolol tartrate 25 milliGRAM(s) Oral two times a day  pantoprazole    Tablet 40 milliGRAM(s) Oral two times a day  torsemide 5 milliGRAM(s) Oral daily    MEDICATIONS  (PRN):    ALLERGIES:  No Known Allergies    PHYSICAL EXAM:  T(C): 36.3 (18 @ 23:39), Max: 36.5 (18 @ 16:32)  T(F): 97.4 (18 @ 23:39), Max: 97.7 (18 @ 16:32)  HR: 100 (18 @ 08:28) (93 - 117)  BP: 113/51 (18 @ 23:39) (113/51 - 120/66)  RR: 20 (18 @ 23:39) (18 - 20)  SpO2: 98% (18 @ 08:28) (95% - 99%)  Wt(kg): --  I&O's Summary    Telemetry:  General: comfortable, in NAD  Heart: +S1S2, 1/6 systolic murmur at the LSB,irregularly irreglar, no rubs, no gallops  Lungs: clear to auscultation bilaterally, no wheezes, no rales, no rhonchi  Abdomen: soft, non-tender, non-distended, + bowel sounds  Extremities: no clubbing, no cyanosis, no edema  Neuro: A&O x3    LABS:                              9.6    18.4  )-----------( 322      ( 2018 09:44 )             30.4     06-22    131<L>  |  90<L>  |  70.0<H>  ----------------------------<  228<H>  4.5   |  25.0  |  2.22<H>    Ca    8.7      2018 08:00      PT/INR - ( 2018 09:44 )   PT: 13.9 sec;   INR: 1.26 ratio           RADIOLOGY & ADDITIONAL TESTS:    ASSESSMENT:  MARIA ANTONIA CALLOWAY is a 83y old female with a history of COPD,paroxysmal atrial fibrillation,lung and breast cancer,renal insufficiency,essential hypertension,PVD,MR,cardiodmyopathy,chronic combined CHF who presented with anemia.She has been off Eliquis.    Please permit me to suggest the followin. No GI workup planned,to treat her medically  2.She was switched to po Torsemide,continue treatment as per renal  3.Decision to be made re: home oxygen  4.Will sign off,to follow up in office,she is cleared for discharge from a cardiac standpoint
HEALTH ISSUES - PROBLEM Dx:  84 y/o female with h/o COPD, CHF, CAD, CRI, who was diagnosed on  with a new onset of A Fib and was started on Eliquis. was called by Dr Polo office (nephrologist ) to go to the ER because hemoglobin is 6.7. patient did not see blood in the urine or stools but attributes the dark stools to iron pills. patient c/o increased SOB since friday that the patient feels out of breath on walking few steps. no chest pain but h/o left lung nodules with follow up with oncologist. h/o excision of right lung cancer and right breast cancer s/p right lumpectomy. FOB: positive    INTERVAL HPI/ OVERNIGHT EVENTS:  feels SOB much better. and wants to go home tomorrow      Vital Signs Last 24 Hrs  T(C): 36.9 (2018 08:22), Max: 36.9 (2018 08:22)  T(F): 98.4 (2018 08:22), Max: 98.4 (2018 08:22)  HR: 89 (2018 08:22) (89 - 111)  BP: 131/70 (2018 08:22) (110/62 - 136/74)  BP(mean): --  RR: 19 (2018 08:22) (18 - 20)  SpO2: 98% (2018 03:43) (96% - 100%)    PHYSICAL EXAM-  GENERAL: well-groomed, well-developed  HEAD:  Atraumatic, Normocephalic  EYES: EOMI, PERRLA, conjunctiva and sclera clear  ENMT: Moist mucous membranes,   NECK: Supple, No JVD, Normal thyroid  NERVOUS SYSTEM:  Alert & Oriented X 3, Motor Strength 5/5 B/L upper and lower extremities  CHEST/LUNG: Clear to auscultate bilaterally; No rales, rhonchi, wheezing, or rubs  HEART: IRRegular rate and rhythm; No murmurs, rubs, or gallops  ABDOMEN: Soft, Nontender, Nondistended; Bowel sounds present  EXTREMITIES:  2+ Peripheral Pulses, No clubbing, cyanosis, or edema  LYMPH: No lymphadenopathy noted  SKIN: No rashes or lesions    LABS:                        9.6    18.4  )-----------( 322      ( 2018 09:44 )             30.4     06-20    137  |  97<L>  |  54.0<H>  ----------------------------<  213<H>  4.3   |  23.0  |  2.26<H>    Ca    8.8      2018 09:44      PT/INR - ( 2018 09:44 )   PT: 13.9 sec;   INR: 1.26 ratio           Urinalysis Basic - ( 2018 08:17 )    Color: Yellow / Appearance: Clear / S.020 / pH: x  Gluc: x / Ketone: Negative  / Bili: Negative / Urobili: Negative mg/dL   Blood: x / Protein: 100 mg/dL / Nitrite: Negative   Leuk Esterase: Negative / RBC: 0-2 /HPF / WBC 0-3 /HPF   Sq Epi: x / Non Sq Epi: Few / Bacteria: x    Assessment and Plan
HEALTH ISSUES - PROBLEM Dx:  84 y/o female with h/o COPD, CHF, CAD, CRI, who was diagnosed on 5/29 with a new onset of A Fib and was started on Eliquis. was called by Dr Polo office (nephrologist ) to go to the ER because hemoglobin is 6.7. patient did not see blood in the urine or stools but attributes the dark stools to iron pills. patient c/o increased SOB since friday that the patient feels out of breath on walking few steps. no chest pain but h/o left lung nodules with follow up with oncologist. h/o excision of righht lung cancer and right breast cancer s/p right lumpectomy. FOB: positive    INTERVAL HPI/ OVERNIGHT EVENTS:  comfortbale, c/o freq urination sec to IV lasix.  states her SOB is slightly more than her baseline and doesnt use home o2    Vital Signs Last 24 Hrs  T(C): 36.1 (19 Jun 2018 11:59), Max: 36.8 (18 Jun 2018 20:04)  T(F): 97 (19 Jun 2018 11:59), Max: 98.3 (18 Jun 2018 20:04)  HR: 93 (19 Jun 2018 11:59) (76 - 110)  BP: 113/60 (19 Jun 2018 11:59) (113/60 - 145/68)  BP(mean): --  RR: 22 (19 Jun 2018 11:59) (19 - 24)  SpO2: 100% (19 Jun 2018 11:59) (93% - 100%)    PHYSICAL EXAM-  GENERAL: well-groomed, well-developed  HEAD:  Atraumatic, Normocephalic  EYES: EOMI, PERRLA, conjunctiva and sclera clear  ENMT: Moist mucous membranes,   NECK: Supple, No JVD, Normal thyroid  NERVOUS SYSTEM:  Alert & Oriented X 3, Motor Strength 5/5 B/L upper and lower extremities  CHEST/LUNG: Clear to auscultate bilaterally; No rales, rhonchi, wheezing, or rubs  HEART: IRRegular rate and rhythm; No murmurs, rubs, or gallops  ABDOMEN: Soft, Nontender, Nondistended; Bowel sounds present  EXTREMITIES:  2+ Peripheral Pulses, No clubbing, cyanosis, or edema  LYMPH: No lymphadenopathy noted  SKIN: No rashes or lesions    LABS:                        9.9    18.2  )-----------( 324      ( 19 Jun 2018 10:49 )             30.3     06-18    131<L>  |  92<L>  |  56.0<H>  ----------------------------<  131<H>  4.5   |  24.0  |  2.55<H>    Ca    9.5      18 Jun 2018 22:06    TPro  6.6  /  Alb  3.8  /  TBili  0.3<L>  /  DBili  x   /  AST  18  /  ALT  9   /  AlkPhos  40  06-18    PT/INR - ( 18 Jun 2018 22:06 )   PT: 18.3 sec;   INR: 1.65 ratio         PTT - ( 18 Jun 2018 22:06 )  PTT:30.0 sec        Assessment and Plan
Patient seen and examined    Feels fine on O2 by NC  gets MORALES easily but much better now  no c/o CP SOB NV   No swelling feet    Vital Signs Last 24 Hrs  T(C): 36.9 (21 Jun 2018 08:22), Max: 36.9 (21 Jun 2018 08:22)  T(F): 98.4 (21 Jun 2018 08:22), Max: 98.4 (21 Jun 2018 08:22)  HR: 89 (21 Jun 2018 08:22) (89 - 104)  BP: 131/70 (21 Jun 2018 08:22) (110/62 - 131/70)  BP(mean): --  RR: 19 (21 Jun 2018 08:22) (18 - 19)  SpO2: 98% (21 Jun 2018 03:43) (96% - 100%)    PHYSICAL EXAM    GENERAL: NAD, obese  EYES:  conjunctiva and sclera clear  NECK: Supple, No JVD/Bruit  NERVOUS SYSTEM:  A/O x3,   CHEST:  No rales, few rhonchi  HEART:  RRR, No murmur  ABDOMEN: Soft, NT/ND BS+  EXTREMITIES:  tr Edema;  SKIN: No rashes    20 Jun 2018 09:44    137    |  97     |  54.0   ----------------------------<  213    4.3     |  23.0   |  2.26     Ca    8.8        20 Jun 2018 09:44                            9.6    18.4  )-----------( 322      ( 20 Jun 2018 09:44 )             30.4       Hb better  FOBT + but not a candidate for GI w/u; On PPIs  Creat better  Likely d/c home on O2  Continue present treatment
Patient seen and examined    I&O's Summary    feels much better  Ambulated off O2    REVIEW OF SYSTEMS:    CONSTITUTIONAL: No F/C  RESPIRATORY: No cough,  No SOB  CARDIOVASCULAR: No CP/palpitations,    GASTROINTESTINAL: No abdominal pain  or NVD   GENITOURINARY: No UTI sx  NEUROLOGICAL: No headaches, NO wk/numbness  MUSCULOSKELETAL:   EXTREMITIES : no swelling,    Vital Signs Last 24 Hrs  T(C): 36.9 (22 Jun 2018 09:13), Max: 36.9 (22 Jun 2018 09:13)  T(F): 98.5 (22 Jun 2018 09:13), Max: 98.5 (22 Jun 2018 09:13)  HR: 119 (22 Jun 2018 11:00) (93 - 132)  BP: 118/61 (22 Jun 2018 09:13) (113/51 - 120/66)  BP(mean): --  RR: 20 (22 Jun 2018 11:00) (18 - 22)  SpO2: 96% (22 Jun 2018 11:00) (94% - 99%)    PHYSICAL EXAM:    GENERAL: NAD, obese, bored  EYES:  conjunctiva and sclera clear  NECK: Supple, No JVD/Bruit  NERVOUS SYSTEM:  A/O x3,   CHEST:  No rales or rhonchi  HEART:  RRR, No murmur  ABDOMEN: Soft, NT/ND BS+  EXTREMITIES:  Tr Edema;  SKIN: No rashes    LABS:      06-22    131<L>  |  90<L>  |  70.0<H>  ----------------------------<  228<H>  4.5   |  25.0  |  2.22<H>    Ca    8.7      22 Jun 2018 08:00        MEDICATIONS  (STANDING):  ALBUTerol/ipratropium for Nebulization  diltiazem   CD  diltiazem   CD  fenofibrate Tablet  ferrous    sulfate  latanoprost 0.005% Ophthalmic Solution  metoprolol tartrate  pantoprazole    Tablet  torsemide
Pt seen and examined. In NAD when seen. Respiratory status prohibits IV sedation for endoscopic procedures. Hb up to 9.6 grams from yesterday.    REVIEW OF SYSTEMS:  Constitutional: No fever, weight loss or fatigue  Cardiovascular: No chest pain, palpitations, dizziness or leg swelling  Gastrointestinal: No abdominal or epigastric pain. No nausea, vomiting or hematemesis; No diarrhea or constipation. No melena or hematochezia.  Skin: No itching, burning, rashes or lesions       MEDICATIONS:  MEDICATIONS  (STANDING):  ALBUTerol/ipratropium for Nebulization 3 milliLiter(s) Nebulizer every 6 hours  diltiazem    milliGRAM(s) Oral daily  diltiazem    milliGRAM(s) Oral at bedtime  fenofibrate Tablet 145 milliGRAM(s) Oral daily  ferrous    sulfate 325 milliGRAM(s) Oral daily  furosemide   Injectable 40 milliGRAM(s) IV Push two times a day  latanoprost 0.005% Ophthalmic Solution 1 Drop(s) Both EYES at bedtime  methylPREDNISolone sodium succinate Injectable 40 milliGRAM(s) IV Push every 8 hours  metoprolol tartrate 25 milliGRAM(s) Oral two times a day  pantoprazole    Tablet 40 milliGRAM(s) Oral two times a day    MEDICATIONS  (PRN):      Allergies    No Known Allergies    Intolerances        Vital Signs Last 24 Hrs  T(C): 36.3 (2018 21:43), Max: 36.8 (2018 08:27)  T(F): 97.3 (2018 21:43), Max: 98.2 (2018 08:27)  HR: 99 (2018 05:13) (87 - 111)  BP: 110/62 (2018 05:13) (110/62 - 136/74)  BP(mean): --  RR: 18 (2018 21:43) (18 - 20)  SpO2: 98% (2018 03:43) (95% - 100%)    -20 @ 07:01  -  06- @ 07:00  --------------------------------------------------------  IN: 0 mL / OUT: 1300 mL / NET: -1300 mL        PHYSICAL EXAM:    General: Well developed; well nourished; in no acute distress  HEENT: MMM, conjunctiva pink and sclera anicteric.  Lungs: Decreased breath sounds bilaterally.  Cor: RRR S1, S2 only.  Gastrointestinal: Abdomen: Soft non-tender non-distended; Normal bowel sounds; No hepatosplenomegaly  Extremities: no cyanosis, clubbing or edema.  Skin: Warm and dry. No obvious rash  Neuro: Pt. a + o x 3    LABS:  CBC Full  -  ( 2018 09:44 )  WBC Count : 18.4 K/uL  Hemoglobin : 9.6 g/dL  Hematocrit : 30.4 %  Platelet Count - Automated : 322 K/uL  Mean Cell Volume : 91.6 fl  Mean Cell Hemoglobin : 28.9 pg  Mean Cell Hemoglobin Concentration : 31.6 g/dL  Auto Neutrophil # : 17.4 K/uL  Auto Lymphocyte # : 0.4 K/uL  Auto Monocyte # : 0.4 K/uL  Auto Eosinophil # : x  Auto Basophil # : x  Auto Neutrophil % : 95.0 %  Auto Lymphocyte % : 3.0 %  Auto Monocyte % : 2.0 %  Auto Eosinophil % : x  Auto Basophil % : x    06-20    137  |  97<L>  |  54.0<H>  ----------------------------<  213<H>  4.3   |  23.0  |  2.26<H>    Ca    8.8      2018 09:44      PT/INR - ( 2018 09:44 )   PT: 13.9 sec;   INR: 1.26 ratio               Urinalysis Basic - ( 2018 08:17 )    Color: Yellow / Appearance: Clear / S.020 / pH: x  Gluc: x / Ketone: Negative  / Bili: Negative / Urobili: Negative mg/dL   Blood: x / Protein: 100 mg/dL / Nitrite: Negative   Leuk Esterase: Negative / RBC: 0-2 /HPF / WBC 0-3 /HPF   Sq Epi: x / Non Sq Epi: Few / Bacteria: x                RADIOLOGY & ADDITIONAL STUDIES (The following images were personally reviewed):
INTERVAL HPI/OVERNIGHT EVENTS:FU for anemia and heme positive stools. Patient with significant dyspnea, CHF. No active bleeding. AC on hold. Daughter at bedside    MEDICATIONS  (STANDING):  ALBUTerol/ipratropium for Nebulization 3 milliLiter(s) Nebulizer every 6 hours  diltiazem    milliGRAM(s) Oral daily  diltiazem    milliGRAM(s) Oral at bedtime  fenofibrate Tablet 145 milliGRAM(s) Oral daily  ferrous    sulfate 325 milliGRAM(s) Oral daily  furosemide   Injectable 40 milliGRAM(s) IV Push two times a day  latanoprost 0.005% Ophthalmic Solution 1 Drop(s) Both EYES at bedtime  methylPREDNISolone sodium succinate Injectable 40 milliGRAM(s) IV Push every 8 hours  metoprolol tartrate 25 milliGRAM(s) Oral two times a day  pantoprazole  Injectable 40 milliGRAM(s) IV Push daily    MEDICATIONS  (PRN):      Allergies    No Known Allergies    Intolerances        Vital Signs Last 24 Hrs  T(C): 36.8 (2018 08:27), Max: 37.1 (2018 04:50)  T(F): 98.2 (2018 08:27), Max: 98.8 (2018 04:50)  HR: 87 (2018 09:35) (82 - 110)  BP: 129/69 (2018 08:27) (129/69 - 143/68)  BP(mean): --  RR: 20 (2018 08:27) (20 - 22)  SpO2: 95% (2018 09:35) (95% - 98%)    LABS:                        9.6    18.4  )-----------( 322      ( 2018 09:44 )             30.4     06-20    137  |  97<L>  |  54.0<H>  ----------------------------<  213<H>  4.3   |  23.0  |  2.26<H>    Ca    8.8      2018 09:44    TPro  6.6  /  Alb  3.8  /  TBili  0.3<L>  /  DBili  x   /  AST  18  /  ALT  9   /  AlkPhos  40  06-18    PT/INR - ( 2018 09:44 )   PT: 13.9 sec;   INR: 1.26 ratio         PTT - ( 2018 22:06 )  PTT:30.0 sec  Urinalysis Basic - ( 2018 08:17 )    Color: Yellow / Appearance: Clear / S.020 / pH: x  Gluc: x / Ketone: Negative  / Bili: Negative / Urobili: Negative mg/dL   Blood: x / Protein: 100 mg/dL / Nitrite: Negative   Leuk Esterase: Negative / RBC: 0-2 /HPF / WBC 0-3 /HPF   Sq Epi: x / Non Sq Epi: Few / Bacteria: x    Iron with Total Binding Capacity (.18 @ 10:49)    Iron - Total Binding Capacity.: 452 ug/dL    % Saturation, Iron: 35 %    Iron Total, Serum: 160 ug/dL        RADIOLOGY & ADDITIONAL TESTS:
HEALTH ISSUES - PROBLEM Dx:  82 y/o female with h/o COPD, CHF, CAD, CRI, who was diagnosed on  with a new onset of A Fib and was started on Eliquis. was called by Dr Polo office (nephrologist ) to go to the ER because hemoglobin is 6.7. patient did not see blood in the urine or stools but attributes the dark stools to iron pills. patient c/o increased SOB since friday that the patient feels out of breath on walking few steps. no chest pain but h/o left lung nodules with follow up with oncologist. h/o excision of right lung cancer and right breast cancer s/p right lumpectomy. FOB: positive    INTERVAL HPI/ OVERNIGHT EVENTS:  sob+, asking for eye drops, otherwise no issues      Vital Signs Last 24 Hrs  T(C): 36.8 (2018 08:27), Max: 37.1 (2018 04:50)  T(F): 98.2 (2018 08:27), Max: 98.8 (2018 04:50)  HR: 87 (2018 09:35) (82 - 110)  BP: 129/69 (2018 08:27) (129/69 - 143/68)  BP(mean): --  RR: 20 (2018 08:27) (20 - 22)  SpO2: 95% (2018 09:35) (95% - 98%)    PHYSICAL EXAM-  GENERAL: well-groomed, well-developed  HEAD:  Atraumatic, Normocephalic  EYES: EOMI, PERRLA, conjunctiva and sclera clear  ENMT: Moist mucous membranes,   NECK: Supple, No JVD, Normal thyroid  NERVOUS SYSTEM:  Alert & Oriented X 3, Motor Strength 5/5 B/L upper and lower extremities  CHEST/LUNG: Clear to auscultate bilaterally; No rales, rhonchi, wheezing, or rubs  HEART: IRRegular rate and rhythm; No murmurs, rubs, or gallops  ABDOMEN: Soft, Nontender, Nondistended; Bowel sounds present  EXTREMITIES:  2+ Peripheral Pulses, No clubbing, cyanosis, or edema  LYMPH: No lymphadenopathy noted  SKIN: No rashes or lesions    LABS:                        9.6    18.4  )-----------( 322      ( 2018 09:44 )             30.4     06-20    137  |  97<L>  |  54.0<H>  ----------------------------<  213<H>  4.3   |  23.0  |  2.26<H>    Ca    8.8      2018 09:44    TPro  6.6  /  Alb  3.8  /  TBili  0.3<L>  /  DBili  x   /  AST  18  /  ALT  9   /  AlkPhos  40  06-18    PT/INR - ( 2018 09:44 )   PT: 13.9 sec;   INR: 1.26 ratio         PTT - ( 2018 22:06 )  PTT:30.0 sec  Urinalysis Basic - ( 2018 08:17 )    Color: Yellow / Appearance: Clear / S.020 / pH: x  Gluc: x / Ketone: Negative  / Bili: Negative / Urobili: Negative mg/dL   Blood: x / Protein: 100 mg/dL / Nitrite: Negative   Leuk Esterase: Negative / RBC: 0-2 /HPF / WBC 0-3 /HPF   Sq Epi: x / Non Sq Epi: Few / Bacteria: x    Assessment and Plan

## 2018-06-25 LAB
CULTURE RESULTS: SIGNIFICANT CHANGE UP
SPECIMEN SOURCE: SIGNIFICANT CHANGE UP

## 2018-07-03 ENCOUNTER — OUTPATIENT (OUTPATIENT)
Dept: OUTPATIENT SERVICES | Facility: HOSPITAL | Age: 83
LOS: 1 days | Discharge: HOME | End: 2018-07-03

## 2018-07-03 DIAGNOSIS — C34.90 MALIGNANT NEOPLASM OF UNSPECIFIED PART OF UNSPECIFIED BRONCHUS OR LUNG: ICD-10-CM

## 2018-07-03 DIAGNOSIS — Z90.710 ACQUIRED ABSENCE OF BOTH CERVIX AND UTERUS: Chronic | ICD-10-CM

## 2018-07-09 ENCOUNTER — OTHER (OUTPATIENT)
Age: 83
End: 2018-07-09

## 2018-07-10 ENCOUNTER — APPOINTMENT (OUTPATIENT)
Dept: HEMATOLOGY ONCOLOGY | Facility: CLINIC | Age: 83
End: 2018-07-10
Payer: MEDICARE

## 2018-07-10 ENCOUNTER — OUTPATIENT (OUTPATIENT)
Dept: OUTPATIENT SERVICES | Facility: HOSPITAL | Age: 83
LOS: 1 days | Discharge: HOME | End: 2018-07-10
Payer: MEDICARE

## 2018-07-10 VITALS
SYSTOLIC BLOOD PRESSURE: 110 MMHG | HEIGHT: 64 IN | DIASTOLIC BLOOD PRESSURE: 69 MMHG | OXYGEN SATURATION: 95 % | HEART RATE: 108 BPM | TEMPERATURE: 98.8 F | WEIGHT: 170.41 LBS | BODY MASS INDEX: 29.09 KG/M2

## 2018-07-10 DIAGNOSIS — Z90.710 ACQUIRED ABSENCE OF BOTH CERVIX AND UTERUS: Chronic | ICD-10-CM

## 2018-07-10 PROCEDURE — 99214 OFFICE O/P EST MOD 30 MIN: CPT

## 2018-07-18 ENCOUNTER — OUTPATIENT (OUTPATIENT)
Dept: OUTPATIENT SERVICES | Facility: HOSPITAL | Age: 83
LOS: 1 days | Discharge: HOME | End: 2018-07-18
Payer: MEDICARE

## 2018-07-18 ENCOUNTER — APPOINTMENT (OUTPATIENT)
Dept: RADIATION ONCOLOGY | Facility: CLINIC | Age: 83
End: 2018-07-18
Payer: MEDICARE

## 2018-07-18 VITALS
OXYGEN SATURATION: 98 % | RESPIRATION RATE: 17 BRPM | BODY MASS INDEX: 29.02 KG/M2 | WEIGHT: 170 LBS | DIASTOLIC BLOOD PRESSURE: 70 MMHG | HEIGHT: 64 IN | SYSTOLIC BLOOD PRESSURE: 117 MMHG | TEMPERATURE: 98 F

## 2018-07-18 DIAGNOSIS — Z90.710 ACQUIRED ABSENCE OF BOTH CERVIX AND UTERUS: Chronic | ICD-10-CM

## 2018-07-18 DIAGNOSIS — Z63.4 DISAPPEARANCE AND DEATH OF FAMILY MEMBER: ICD-10-CM

## 2018-07-18 PROBLEM — I50.9 HEART FAILURE, UNSPECIFIED: Chronic | Status: ACTIVE | Noted: 2018-05-29

## 2018-07-18 PROBLEM — I48.91 UNSPECIFIED ATRIAL FIBRILLATION: Chronic | Status: ACTIVE | Noted: 2018-06-19

## 2018-07-18 PROBLEM — I25.10 ATHEROSCLEROTIC HEART DISEASE OF NATIVE CORONARY ARTERY WITHOUT ANGINA PECTORIS: Chronic | Status: ACTIVE | Noted: 2018-05-29

## 2018-07-18 PROBLEM — J44.9 CHRONIC OBSTRUCTIVE PULMONARY DISEASE, UNSPECIFIED: Chronic | Status: ACTIVE | Noted: 2018-05-29

## 2018-07-18 PROCEDURE — 99205 OFFICE O/P NEW HI 60 MIN: CPT | Mod: 25

## 2018-07-18 PROCEDURE — 77263 THER RADIOLOGY TX PLNG CPLX: CPT

## 2018-07-18 RX ORDER — APIXABAN 2.5 MG/1
2.5 TABLET, FILM COATED ORAL
Qty: 60 | Refills: 0 | Status: COMPLETED | COMMUNITY
Start: 2018-05-29 | End: 2018-07-18

## 2018-07-18 SDOH — SOCIAL STABILITY - SOCIAL INSECURITY: DISSAPEARANCE AND DEATH OF FAMILY MEMBER: Z63.4

## 2018-07-24 ENCOUNTER — OTHER (OUTPATIENT)
Age: 83
End: 2018-07-24

## 2018-07-24 ENCOUNTER — APPOINTMENT (OUTPATIENT)
Dept: CT IMAGING | Facility: CLINIC | Age: 83
End: 2018-07-24
Payer: MEDICARE

## 2018-07-24 ENCOUNTER — OUTPATIENT (OUTPATIENT)
Dept: OUTPATIENT SERVICES | Facility: HOSPITAL | Age: 83
LOS: 1 days | End: 2018-07-24

## 2018-07-24 DIAGNOSIS — R91.1 SOLITARY PULMONARY NODULE: ICD-10-CM

## 2018-07-24 DIAGNOSIS — Z90.710 ACQUIRED ABSENCE OF BOTH CERVIX AND UTERUS: Chronic | ICD-10-CM

## 2018-07-24 PROCEDURE — 71250 CT THORAX DX C-: CPT | Mod: 26

## 2018-07-24 PROCEDURE — 77290 THER RAD SIMULAJ FIELD CPLX: CPT | Mod: 26

## 2018-07-24 PROCEDURE — 77334 RADIATION TREATMENT AID(S): CPT | Mod: 26

## 2018-07-26 ENCOUNTER — OTHER (OUTPATIENT)
Age: 83
End: 2018-07-26

## 2018-07-26 ENCOUNTER — APPOINTMENT (OUTPATIENT)
Dept: RADIATION ONCOLOGY | Facility: CLINIC | Age: 83
End: 2018-07-26
Payer: MEDICARE

## 2018-07-26 VITALS
TEMPERATURE: 98.4 F | RESPIRATION RATE: 16 BRPM | OXYGEN SATURATION: 95 % | SYSTOLIC BLOOD PRESSURE: 111 MMHG | DIASTOLIC BLOOD PRESSURE: 70 MMHG | WEIGHT: 171 LBS | BODY MASS INDEX: 29.19 KG/M2 | HEIGHT: 64 IN | HEART RATE: 110 BPM

## 2018-07-26 PROCEDURE — 99213 OFFICE O/P EST LOW 20 MIN: CPT

## 2018-07-31 ENCOUNTER — OUTPATIENT (OUTPATIENT)
Dept: OUTPATIENT SERVICES | Facility: HOSPITAL | Age: 83
LOS: 1 days | End: 2018-07-31

## 2018-07-31 ENCOUNTER — APPOINTMENT (OUTPATIENT)
Dept: NUCLEAR MEDICINE | Facility: CLINIC | Age: 83
End: 2018-07-31
Payer: MEDICARE

## 2018-07-31 ENCOUNTER — OTHER (OUTPATIENT)
Age: 83
End: 2018-07-31

## 2018-07-31 DIAGNOSIS — Z90.710 ACQUIRED ABSENCE OF BOTH CERVIX AND UTERUS: Chronic | ICD-10-CM

## 2018-07-31 DIAGNOSIS — C34.90 MALIGNANT NEOPLASM OF UNSPECIFIED PART OF UNSPECIFIED BRONCHUS OR LUNG: ICD-10-CM

## 2018-07-31 PROCEDURE — 78815 PET IMAGE W/CT SKULL-THIGH: CPT | Mod: 26,PS

## 2018-08-17 ENCOUNTER — RESULT REVIEW (OUTPATIENT)
Age: 83
End: 2018-08-17

## 2018-08-20 ENCOUNTER — APPOINTMENT (OUTPATIENT)
Dept: PULMONOLOGY | Facility: CLINIC | Age: 83
End: 2018-08-20
Payer: MEDICARE

## 2018-08-20 VITALS — WEIGHT: 166 LBS | BODY MASS INDEX: 28.49 KG/M2

## 2018-08-20 VITALS — OXYGEN SATURATION: 98 % | HEART RATE: 93 BPM

## 2018-08-20 VITALS — SYSTOLIC BLOOD PRESSURE: 104 MMHG | DIASTOLIC BLOOD PRESSURE: 60 MMHG

## 2018-08-20 DIAGNOSIS — C34.12 MALIGNANT NEOPLASM OF UPPER LOBE, LEFT BRONCHUS OR LUNG: ICD-10-CM

## 2018-08-20 DIAGNOSIS — Z85.3 PERSONAL HISTORY OF MALIGNANT NEOPLASM OF BREAST: ICD-10-CM

## 2018-08-20 PROCEDURE — 99215 OFFICE O/P EST HI 40 MIN: CPT | Mod: 25

## 2018-08-20 PROCEDURE — 94010 BREATHING CAPACITY TEST: CPT

## 2018-08-20 RX ORDER — TORSEMIDE 5 MG/1
TABLET ORAL
Refills: 0 | Status: DISCONTINUED | COMMUNITY
End: 2018-08-20

## 2018-08-27 ENCOUNTER — INPATIENT (INPATIENT)
Facility: HOSPITAL | Age: 83
LOS: 2 days | Discharge: HOME | DRG: 291 | End: 2018-08-30
Attending: HOSPITALIST | Admitting: INTERNAL MEDICINE
Payer: MEDICARE

## 2018-08-27 VITALS
SYSTOLIC BLOOD PRESSURE: 151 MMHG | HEART RATE: 135 BPM | RESPIRATION RATE: 40 BRPM | DIASTOLIC BLOOD PRESSURE: 84 MMHG | OXYGEN SATURATION: 83 %

## 2018-08-27 DIAGNOSIS — R06.02 SHORTNESS OF BREATH: ICD-10-CM

## 2018-08-27 DIAGNOSIS — Z90.710 ACQUIRED ABSENCE OF BOTH CERVIX AND UTERUS: Chronic | ICD-10-CM

## 2018-08-27 LAB
ALBUMIN SERPL ELPH-MCNC: 3.8 G/DL — SIGNIFICANT CHANGE UP (ref 3.3–5.2)
ALP SERPL-CCNC: 43 U/L — SIGNIFICANT CHANGE UP (ref 40–120)
ALT FLD-CCNC: <5 U/L — SIGNIFICANT CHANGE UP
ANION GAP SERPL CALC-SCNC: 15 MMOL/L — SIGNIFICANT CHANGE UP (ref 5–17)
AST SERPL-CCNC: 15 U/L — SIGNIFICANT CHANGE UP
BASE EXCESS BLDA CALC-SCNC: 3.4 MMOL/L — HIGH (ref -2–2)
BASOPHILS # BLD AUTO: 0.1 K/UL — SIGNIFICANT CHANGE UP (ref 0–0.2)
BASOPHILS NFR BLD AUTO: 0.6 % — SIGNIFICANT CHANGE UP (ref 0–2)
BILIRUB SERPL-MCNC: 0.5 MG/DL — SIGNIFICANT CHANGE UP (ref 0.4–2)
BLOOD GAS COMMENTS ARTERIAL: SIGNIFICANT CHANGE UP
BUN SERPL-MCNC: 18 MG/DL — SIGNIFICANT CHANGE UP (ref 8–20)
CALCIUM SERPL-MCNC: 9.8 MG/DL — SIGNIFICANT CHANGE UP (ref 8.6–10.2)
CHLORIDE SERPL-SCNC: 90 MMOL/L — LOW (ref 98–107)
CK SERPL-CCNC: 93 U/L — SIGNIFICANT CHANGE UP (ref 25–170)
CK SERPL-CCNC: 96 U/L — SIGNIFICANT CHANGE UP (ref 25–170)
CO2 SERPL-SCNC: 25 MMOL/L — SIGNIFICANT CHANGE UP (ref 22–29)
CREAT SERPL-MCNC: 1.68 MG/DL — HIGH (ref 0.5–1.3)
EOSINOPHIL # BLD AUTO: 0.2 K/UL — SIGNIFICANT CHANGE UP (ref 0–0.5)
EOSINOPHIL NFR BLD AUTO: 1.2 % — SIGNIFICANT CHANGE UP (ref 0–6)
GAS PNL BLDA: SIGNIFICANT CHANGE UP
GLUCOSE SERPL-MCNC: 176 MG/DL — HIGH (ref 70–115)
HCO3 BLDA-SCNC: 27 MMOL/L — HIGH (ref 20–26)
HCT VFR BLD CALC: 37.7 % — SIGNIFICANT CHANGE UP (ref 37–47)
HGB BLD-MCNC: 12.2 G/DL — SIGNIFICANT CHANGE UP (ref 12–16)
HOROWITZ INDEX BLDA+IHG-RTO: SIGNIFICANT CHANGE UP
LYMPHOCYTES # BLD AUTO: 1.6 K/UL — SIGNIFICANT CHANGE UP (ref 1–4.8)
LYMPHOCYTES # BLD AUTO: 11.1 % — LOW (ref 20–55)
MCHC RBC-ENTMCNC: 27.2 PG — SIGNIFICANT CHANGE UP (ref 27–31)
MCHC RBC-ENTMCNC: 32.4 G/DL — SIGNIFICANT CHANGE UP (ref 32–36)
MCV RBC AUTO: 84 FL — SIGNIFICANT CHANGE UP (ref 81–99)
MONOCYTES # BLD AUTO: 0.6 K/UL — SIGNIFICANT CHANGE UP (ref 0–0.8)
MONOCYTES NFR BLD AUTO: 4.5 % — SIGNIFICANT CHANGE UP (ref 3–10)
NEUTROPHILS # BLD AUTO: 11.6 K/UL — HIGH (ref 1.8–8)
NEUTROPHILS NFR BLD AUTO: 82 % — HIGH (ref 37–73)
NT-PROBNP SERPL-SCNC: 9188 PG/ML — HIGH (ref 0–300)
PCO2 BLDA: 41 MMHG — SIGNIFICANT CHANGE UP (ref 35–45)
PH BLDA: 7.44 — SIGNIFICANT CHANGE UP (ref 7.35–7.45)
PLATELET # BLD AUTO: 326 K/UL — SIGNIFICANT CHANGE UP (ref 150–400)
PO2 BLDA: 97 MMHG — SIGNIFICANT CHANGE UP (ref 83–108)
POTASSIUM SERPL-MCNC: 4.2 MMOL/L — SIGNIFICANT CHANGE UP (ref 3.5–5.3)
POTASSIUM SERPL-SCNC: 4.2 MMOL/L — SIGNIFICANT CHANGE UP (ref 3.5–5.3)
PROCALCITONIN SERPL-MCNC: 0.11 NG/ML — HIGH (ref 0–0.04)
PROT SERPL-MCNC: 7.4 G/DL — SIGNIFICANT CHANGE UP (ref 6.6–8.7)
RBC # BLD: 4.49 M/UL — SIGNIFICANT CHANGE UP (ref 4.4–5.2)
RBC # FLD: 16 % — HIGH (ref 11–15.6)
SAO2 % BLDA: 98 % — SIGNIFICANT CHANGE UP (ref 95–99)
SODIUM SERPL-SCNC: 130 MMOL/L — LOW (ref 135–145)
T3 SERPL-MCNC: 106 NG/DL — SIGNIFICANT CHANGE UP (ref 80–200)
T4 AB SER-ACNC: 8.9 UG/DL — SIGNIFICANT CHANGE UP (ref 4.5–12)
TROPONIN T SERPL-MCNC: 0.01 NG/ML — SIGNIFICANT CHANGE UP (ref 0–0.06)
TROPONIN T SERPL-MCNC: <0.01 NG/ML — SIGNIFICANT CHANGE UP (ref 0–0.06)
TROPONIN T SERPL-MCNC: <0.01 NG/ML — SIGNIFICANT CHANGE UP (ref 0–0.06)
TSH SERPL-MCNC: 0.84 UIU/ML — SIGNIFICANT CHANGE UP (ref 0.27–4.2)
TSH SERPL-MCNC: 0.93 UIU/ML — SIGNIFICANT CHANGE UP (ref 0.27–4.2)
WBC # BLD: 14.2 K/UL — HIGH (ref 4.8–10.8)
WBC # FLD AUTO: 14.2 K/UL — HIGH (ref 4.8–10.8)

## 2018-08-27 PROCEDURE — 99291 CRITICAL CARE FIRST HOUR: CPT

## 2018-08-27 PROCEDURE — 99292 CRITICAL CARE ADDL 30 MIN: CPT

## 2018-08-27 PROCEDURE — 71045 X-RAY EXAM CHEST 1 VIEW: CPT | Mod: 26

## 2018-08-27 PROCEDURE — 71250 CT THORAX DX C-: CPT | Mod: 26

## 2018-08-27 PROCEDURE — 93970 EXTREMITY STUDY: CPT | Mod: 26

## 2018-08-27 PROCEDURE — 99223 1ST HOSP IP/OBS HIGH 75: CPT

## 2018-08-27 PROCEDURE — 99222 1ST HOSP IP/OBS MODERATE 55: CPT

## 2018-08-27 RX ORDER — ALBUTEROL 90 UG/1
2 AEROSOL, METERED ORAL EVERY 6 HOURS
Qty: 0 | Refills: 0 | Status: DISCONTINUED | OUTPATIENT
Start: 2018-08-27 | End: 2018-08-30

## 2018-08-27 RX ORDER — METOPROLOL TARTRATE 50 MG
25 TABLET ORAL
Qty: 0 | Refills: 0 | COMMUNITY

## 2018-08-27 RX ORDER — FENOFIBRATE,MICRONIZED 130 MG
145 CAPSULE ORAL DAILY
Qty: 0 | Refills: 0 | Status: DISCONTINUED | OUTPATIENT
Start: 2018-08-27 | End: 2018-08-30

## 2018-08-27 RX ORDER — LOSARTAN POTASSIUM 100 MG/1
50 TABLET, FILM COATED ORAL DAILY
Qty: 0 | Refills: 0 | Status: DISCONTINUED | OUTPATIENT
Start: 2018-08-27 | End: 2018-08-30

## 2018-08-27 RX ORDER — APIXABAN 2.5 MG/1
2.5 TABLET, FILM COATED ORAL EVERY 12 HOURS
Qty: 0 | Refills: 0 | Status: DISCONTINUED | OUTPATIENT
Start: 2018-08-27 | End: 2018-08-29

## 2018-08-27 RX ORDER — ASPIRIN/CALCIUM CARB/MAGNESIUM 324 MG
1 TABLET ORAL
Qty: 0 | Refills: 0 | COMMUNITY

## 2018-08-27 RX ORDER — LATANOPROST 0.05 MG/ML
1 SOLUTION/ DROPS OPHTHALMIC; TOPICAL AT BEDTIME
Qty: 0 | Refills: 0 | Status: DISCONTINUED | OUTPATIENT
Start: 2018-08-27 | End: 2018-08-30

## 2018-08-27 RX ORDER — METOPROLOL TARTRATE 50 MG
100 TABLET ORAL DAILY
Qty: 0 | Refills: 0 | Status: DISCONTINUED | OUTPATIENT
Start: 2018-08-27 | End: 2018-08-30

## 2018-08-27 RX ORDER — PANTOPRAZOLE SODIUM 20 MG/1
40 TABLET, DELAYED RELEASE ORAL
Qty: 0 | Refills: 0 | Status: DISCONTINUED | OUTPATIENT
Start: 2018-08-27 | End: 2018-08-30

## 2018-08-27 RX ORDER — FUROSEMIDE 40 MG
40 TABLET ORAL DAILY
Qty: 0 | Refills: 0 | Status: DISCONTINUED | OUTPATIENT
Start: 2018-08-27 | End: 2018-08-28

## 2018-08-27 RX ORDER — ASPIRIN/CALCIUM CARB/MAGNESIUM 324 MG
81 TABLET ORAL DAILY
Qty: 0 | Refills: 0 | Status: DISCONTINUED | OUTPATIENT
Start: 2018-08-27 | End: 2018-08-30

## 2018-08-27 RX ORDER — IPRATROPIUM/ALBUTEROL SULFATE 18-103MCG
3 AEROSOL WITH ADAPTER (GRAM) INHALATION ONCE
Qty: 0 | Refills: 0 | Status: DISCONTINUED | OUTPATIENT
Start: 2018-08-27 | End: 2018-08-30

## 2018-08-27 RX ORDER — NITROGLYCERIN 6.5 MG
0.4 CAPSULE, EXTENDED RELEASE ORAL ONCE
Qty: 0 | Refills: 0 | Status: COMPLETED | OUTPATIENT
Start: 2018-08-27 | End: 2018-08-27

## 2018-08-27 RX ADMIN — LATANOPROST 1 DROP(S): 0.05 SOLUTION/ DROPS OPHTHALMIC; TOPICAL at 22:59

## 2018-08-27 RX ADMIN — Medication 125 MILLIGRAM(S): at 07:53

## 2018-08-27 RX ADMIN — Medication 60 MILLIGRAM(S): at 14:12

## 2018-08-27 RX ADMIN — Medication 0.4 MILLIGRAM(S): at 09:05

## 2018-08-27 NOTE — ED ADULT NURSE REASSESSMENT NOTE - NS ED NURSE REASSESS COMMENT FT1
Bi-pap removed from pt to administer SL Nitro. pt tolerated well. Pt requesting to have bi-pap removed, will speak with Dr. Emanuel.
Dr Theodore at bedside.
respiratory therapist Emma at bedside, pt taken off bi-pap placed on 2L nasal canula and tolerating well O2 saturation at 97%.  Pt offers no other complaints at this time. Will continue to monitor.
Pt assisted to the bathroom via wheelchair with portable O2 tank. Daughter with pt and Nursing assistant.
Pt remains A& Ox4. respirations even & unlabored.

## 2018-08-27 NOTE — ED ADULT NURSE NOTE - OBJECTIVE STATEMENT
Pt c/o difficulty breathing and worsening since Friday. Pt states she also has loss of appetite and has not been eating like she normally does. Pt denies cough, denies fever/chills. Pt A & OX4. Pt on bi-pap and tolerating well. Pt c/o difficulty breathing and worsening since Friday. Pt states she also has loss of appetite and has not been eating like she normally does. Pt denies cough, denies fever/chills. Pt A & OX4. Pt on bi-pap and tolerating well. Received pt on bi-pap with respiratory treatment in progress via bi-pap machine.

## 2018-08-27 NOTE — H&P ADULT - NSHPLABSRESULTS_GEN_ALL_CORE
12.2   14.2  )-----------( 326      ( 27 Aug 2018 07:52 )             37.7   08-27    130<L>  |  90<L>  |  18.0  ----------------------------<  176<H>  4.2   |  25.0  |  1.68<H>    Ca    9.8      27 Aug 2018 07:52    TPro  7.4  /  Alb  3.8  /  TBili  0.5  /  DBili  x   /  AST  15  /  ALT  <5  /  AlkPhos  43  08-27    < from: Xray Chest 1 View-PORTABLE IMMEDIATE (08.27.18 @ 07:26) >    EXAM:  XR CHEST PORTABLE IMMED 1V                          PROCEDURE DATE:  08/27/2018      pro BNP [id number]  < from: Xray Chest 1 View-PORTABLE IMMEDIATE (08.27.18 @ 07:26) >    Impression: Increased bilateral pulmonic infiltrates compatible with   pneumonia. Possible small right pleural effusion.

## 2018-08-27 NOTE — H&P ADULT - NSHPPHYSICALEXAM_GEN_ALL_CORE
Vital Signs Last 24 Hrs  T(C): --  T(F): --  HR: 88 (27 Aug 2018 09:32) (88 - 139)  BP: 110/71 (27 Aug 2018 09:32) (110/71 - 151/84)  BP(mean): --  RR: 28 (27 Aug 2018 09:32) (28 - 40)  SpO2: 100% (27 Aug 2018 09:32) (83% - 100%) on bipap

## 2018-08-27 NOTE — H&P ADULT - HISTORY OF PRESENT ILLNESS
82 y/o female with h/o COPD, CHF, CAD, CKD, h/o left lung nodules with follow up with oncologist. h/o excision of right lung cancer and right breast cancer s/p right lumpectomy. doesnt wear any home o2. with recent hospitalization on  5/29 with a new onset of A Fib and was started on Eliquis. was called by Dr Polo office (nephrologist ) to go to the ER because hemoglobin is 6.7. was FOB: positive , received blood transfusion , had post transfusion diastolic chf suspected and treated with diuretics. ef good. but also noted copd exacerbation and treated with steroids with quick taper. Patient was dcd on aspirin and Eliquis was initially held on previous dc due to gi bleed.   patient present s today with c/o sob gallego with progressive worsening for last 4-5 days, more worse since yesterday, has non productive cough,  no fever or chills. denies n/v/abd pain or diarrhaea. denies cp or palpitations. c/o lower ext edema.     denies any recent falls. denies hematemesis or jose guadalupe, hematochezia. denies dizziness or lightheadedness.   in er noted to be in afib with rvr with hr in 130s-140s.   in er was given iv cardizem 10 mg iv x 1 with hr down to 80s. also given iv solumderol, and lasix. was  palced on bipap briefly. now on nasal canula with o2 sats in low 90s on 4l NC.

## 2018-08-27 NOTE — ED ADULT NURSE NOTE - NSIMPLEMENTINTERV_GEN_ALL_ED
Implemented All Fall Risk Interventions:  [place] to call system. Call bell, personal items and telephone within reach. Instruct patient to call for assistance. Room bathroom lighting operational. Non-slip footwear when patient is off stretcher. Physically safe environment: no spills, clutter or unnecessary equipment. Stretcher in lowest position, wheels locked, appropriate side rails in place. Provide visual cue, wrist band, yellow gown, etc. Monitor gait and stability. Monitor for mental status changes and reorient to person, place, and time. Review medications for side effects contributing to fall risk. Reinforce activity limits and safety measures with patient and family.

## 2018-08-27 NOTE — ED ADULT TRIAGE NOTE - CHIEF COMPLAINT QUOTE
pt with increased difficulty breathing since Friday. pt tachycardic and tachypnea. pt reports slight improvement with oxygen placement. pt denies any recent sick contact, cough, fever, chills, chest pain.

## 2018-08-27 NOTE — CONSULT NOTE ADULT - SUBJECTIVE AND OBJECTIVE BOX
PULMONARY CONSULT NOTE      MARIA ANTONIA CALLOWAYGUSTAVO-[id number]    Patient is a 83y old  Female who presents with a chief complaint of sob, MORALES (27 Aug 2018 13:54)      HISTORY OF PRESENT ILLNESS:    COPD - mostly restricted by dorita on albuterol - Dr Pardo  CAD/ CHF - followed by Dr Valdez  CKD followed by Dr Polo (anemia and blood transfusion in May  Ovarian Ca in 92 - ROSALINE-BSO - no adjuvant chemo  Rt Mastect for breast Ca in 2008 (no adjuvant chemo)  RUL adenoca of lung 2009 - [facility] - Dr Hernandez (No adjuvant chem)  PET Pos RANJAN lesion - followed by Dr Olson and Dr El - no bx or Rx to this point    ADM with acute dyspnea and afib with rvr  Cardizem and bipap for a time  Better          MEDICATIONS  (STANDING):  ALBUTerol/ipratropium for Nebulization. 3 milliLiter(s) Nebulizer once  apixaban 2.5 milliGRAM(s) Oral every 12 hours  aspirin  chewable 81 milliGRAM(s) Oral daily  diltiazem    Tablet 60 milliGRAM(s) Oral four times a day  fenofibrate Tablet 145 milliGRAM(s) Oral daily  furosemide   Injectable 40 milliGRAM(s) IV Push daily  latanoprost 0.005% Ophthalmic Solution 1 Drop(s) Both EYES at bedtime  levoFLOXacin IVPB      losartan 50 milliGRAM(s) Oral daily  methylPREDNISolone sodium succinate Injectable 60 milliGRAM(s) IV Push three times a day  metoprolol succinate  milliGRAM(s) Oral daily  pantoprazole    Tablet 40 milliGRAM(s) Oral before breakfast      MEDICATIONS  (PRN):  ALBUTerol    90 MICROgram(s) HFA Inhaler 2 Puff(s) Inhalation every 6 hours PRN Shortness of Breath      Allergies    No Known Allergies    Intolerances        PAST MEDICAL & SURGICAL HISTORY:  Afib  CAD (coronary artery disease)  CHF (congestive heart failure)  COPD (chronic obstructive pulmonary disease)  H/O: hysterectomy      FAMILY HISTORY:  No pertinent family history in first degree relatives      SOCIAL HISTORY  Smoking History:     REVIEW OF SYSTEMS:    CONSTITUTIONAL:  No fevers, chills, sweats    HEENT:  Eyes:  No diplopia or blurred vision. ENT:  No earache, sore throat or runny nose.    CARDIOVASCULAR:  No pressure, squeezing, tightness, or heaviness about the chest; no palpitations.    RESPIRATORY:  No cough, Mod SOBOE    GASTROINTESTINAL:  No abdominal pain, nausea, vomiting or diarrhea.    GENITOURINARY:  No dysuria, frequency or urgency.    NEUROLOGIC:  No paresthesias, fasciculations, seizures or weakness.    PSYCHIATRIC:  No disorder of thought or mood.    Vital Signs Last 24 Hrs  T(C): --  T(F): --  HR: 88 (27 Aug 2018 09:32) (88 - 139)  BP: 110/71 (27 Aug 2018 09:32) (110/71 - 151/84)  BP(mean): --  RR: 28 (27 Aug 2018 09:32) (28 - 40)  SpO2: 100% (27 Aug 2018 09:32) (83% - 100%)    PHYSICAL EXAMINATION:    GENERAL: The patient is an obese female _____in no apparent distress.     HEENT: Head is normocephalic and atraumatic. Extraocular muscles are intact. Mucous membranes are moist.     NECK: Supple.     LUNGS: Basilar creps to auscultation without wheezing, or rhonchi. Respirations unlabored    HEART: Irregular rate and rhythm without murmur.    ABDOMEN: Soft, nontender, and nondistended.  No hepatosplenomegaly is noted.    EXTREMITIES: Without any cyanosis, clubbing, rash, lesions or edema.    NEUROLOGIC: Grossly intact.      LABS:                        12.2   14.2  )-----------( 326      ( 27 Aug 2018 07:52 )             37.7     08-27    130<L>  |  90<L>  |  18.0  ----------------------------<  176<H>  4.2   |  25.0  |  1.68<H>    Ca    9.8      27 Aug 2018 07:52    TPro  7.4  /  Alb  3.8  /  TBili  0.5  /  DBili  x   /  AST  15  /  ALT  <5  /  AlkPhos  43  08-27          CARDIAC MARKERS ( 27 Aug 2018 14:25 )  x     / <0.01 ng/mL / x     / x     / x      CARDIAC MARKERS ( 27 Aug 2018 07:52 )  x     / 0.01 ng/mL / 96 U/L / x     / x            Serum Pro-Brain Natriuretic Peptide: 9188 pg/mL (08-27-18 @ 07:52)          MICROBIOLOGY:NA    RADIOLOGY & ADDITIONAL STUDIES:    CXR on 8/27/18  CHF

## 2018-08-27 NOTE — CONSULT NOTE ADULT - SUBJECTIVE AND OBJECTIVE BOX
Cardiology Consult    CHIEF COMPLAINT: Shortness of breath    HISTORY OF PRESENT ILLNESS: MARIA ANTONIA CALLOWAY is a 83y old female with a history of essential hypertension,chronic atrial fibrillation,hyperlipidemia,renal insufficiency,breast and lung cancer,AR,COPD, who presents with shortness of breath for the last several days with minimal activity.She also has orthopnea,denies chest pain or palpitations,fever ,chills.    PROBLEM LIST:    PAST MEDICAL HISTORY:  Afib  CAD (coronary artery disease)  CHF (congestive heart failure)  COPD (chronic obstructive pulmonary disease)    PAST SURGICAL HISTORY:  H/O: hysterectomy    MEDICATIONS  (STANDING):  ALBUTerol/ipratropium for Nebulization. 3 milliLiter(s) Nebulizer once  apixaban 2.5 milliGRAM(s) Oral every 12 hours  aspirin  chewable 81 milliGRAM(s) Oral daily  diltiazem    Tablet 60 milliGRAM(s) Oral four times a day  fenofibrate Tablet 145 milliGRAM(s) Oral daily  furosemide   Injectable 40 milliGRAM(s) IV Push daily  latanoprost 0.005% Ophthalmic Solution 1 Drop(s) Both EYES at bedtime  levoFLOXacin IVPB      losartan 50 milliGRAM(s) Oral daily  methylPREDNISolone sodium succinate Injectable 60 milliGRAM(s) IV Push three times a day  metoprolol succinate  milliGRAM(s) Oral daily  pantoprazole    Tablet 40 milliGRAM(s) Oral before breakfast    MEDICATIONS  (PRN):  ALBUTerol    90 MICROgram(s) HFA Inhaler 2 Puff(s) Inhalation every 6 hours PRN Shortness of Breath    ALLERGIES:  No Known Allergies    SOCIAL HISTORY:  Tobacco: quit in   Alcohol: no  Drugs: no    FAMILY HISTORY:  No pertinent family history in first degree relatives    REVIEW OF SYSTEMS:  Constitutional: no fatigue, no fevers/chills, no weight change  HEENT: no visual disturbances, no hearing loss  Cardiac: no chest pain, no palpitations, no orthopnea, no PND  Respiratory: has shortness of breath, no cough  GI: no abdominal pain, no blood in the stool, no N/V, no diarrhea  Urological: no dysuria, no hematuria  Hematological: no easy bruising or bleeding  Musculoskeletal: no joint pain, no back pain  Neurological: no headaches, no syncope  Psychiatric: no anxiety, no depression  Skin: no rashes    PHYSICAL EXAM:    T(C): --  T(F): --  HR: 91 (18 @ 11:37) (88 - 139)  BP: 124/72 (18 @ 11:37) (110/71 - 151/84)  RR: 22 (18 @ 11:37) (22 - 40)  SpO2: 98% (18 @ 11:37) (83% - 100%)  Wt(kg): --  Telemetry: atrial fibrillation  General: comfortable, in NAD  HEENT: EOMI, normocephalic, atraumatic  Neck: no JVD, no carotid bruits  Heart: +S1S2,irregularly irregular,2/6 systolic murmur at the LSB, no rubs, no gallops  Lungs: clear to auscultation bilaterally, no wheezes, no rales, no rhonchi  Abdomen: soft, non-tender, non-distended, + bowel sounds  Extremities: no clubbing, no cyanosis, no edema  Vascular: 2+ dorsalis pedis pulses bilaterally  Neuro: A&O x3    EKG: atrial fibrillation,PVC's    LABS:  Serum Pro-Brain Natriuretic Peptide: 9188 pg/mL ( @ 07:52)    Troponin T, Serum: <0.01 ng/mL ( @ 14:25)  Troponin T, Serum: 0.01 ng/mL ( @ 07:52)                            12.2   14.2  )-----------( 326      ( 27 Aug 2018 07:52 )             37.7         130<L>  |  90<L>  |  18.0  ----------------------------<  176<H>  4.2   |  25.0  |  1.68<H>    Ca    9.8      27 Aug 2018 07:52    TPro  7.4  /  Alb  3.8  /  TBili  0.5  /  DBili  x   /  AST  15  /  ALT  <5  /  AlkPhos  43        RADIOLOGY & ADDITIONAL TESTS:  Chest x-ray:bilateral infiltrates  ASSESSMENT:  MARIA ANTONIA CALLOWAY is a 83y old female with a history of essential hypertension,chronic atrial fibrillation,renal insufficiency,AR,breast and lung cancer,COPD who presented with shortness of breath.Her chest x-ray is compatible with pneumonia.    Please permit me to suggest the followin. She has been started on an antibiotic for pneumonia  2.Continue with IV Lasix for now  3.Continue with Eliquis for her atrial fibrillation.Her [id number] score is 6

## 2018-08-27 NOTE — ED ADULT NURSE NOTE - CHIEF COMPLAINT QUOTE
pt with increased difficulty breathing since Friday. pt tachycardic and tachypnea. pt reports slight improvement with oxygen placement. pt denies any recent sick contact, cough, fever, chills, chest pain. MD called to bedside.

## 2018-08-27 NOTE — ED PROVIDER NOTE - OBJECTIVE STATEMENT
This patient is an 83 year old woman with hx of A-fib, COPD, CAD, and CHF who presents to the ER c/o SOB.  Patient states that the symptoms began 2 days ago but worsened early this morning.  The SOB is at rest and constant.  Patient states that she has been too SOB at home to do any activity.  She denies recent illness, URI symptoms, sick contacts, recent travel, chest pain, and cough.  As per EMS patient was hypoxic en route to the hospital and given oxygen via nasal canula.  Patient states that she has never needed BiPAP or to be intubated in the past.

## 2018-08-27 NOTE — H&P ADULT - ASSESSMENT
82 y/o female with h/o COPD, CHF, CAD, CKD, h/o left lung nodules with follow up with oncologist. h/o excision of right lung cancer and right breast cancer s/p right lumpectomy. doesnt wear any home o2. with recent hospitalization on  5/29 with a new onset of A Fib and was started on Eliquis initially taht was held on previous dc due to GIB to be resumed later. Patient presents to er with gallego / sob.     > Acute on chronic hypoxic resp failure: likely sec to acute on chronic DCHF and component of COPD exacerbation.   admit to tele , check abgs , trend trops   will start on iv lasix , monito i/os  IV steroids   cardiology cosnult   pulmonary consult  check tsh     > b/l lower lung possible infiltrates/ possible cap vs atelectasis.   denies any fever or chills .  WBCs mildly elevated but lower than baseline on previous dc   check sp cxs. will continue with iv abxs   repeat cxr in couple of days     >afib with rvr   hr better controlled now. will increase home po cardizem dose.   will continue with aspirin and eliquis   cardiology cosnult called. ? possible candidate for ablation.     > GI ppx/ gerd: will c/w protonix

## 2018-08-27 NOTE — ED PROVIDER NOTE - MEDICAL DECISION MAKING DETAILS
Patient in respiratory distress with increase work of breathing.  Minimal wheezing on exam.  COPD exacerbation vs CHF exacerbation.  Patient placed on BiPAP.  Will check Labs, CXR, administer solumedrol, duonebs and Admit.

## 2018-08-28 DIAGNOSIS — R91.1 SOLITARY PULMONARY NODULE: ICD-10-CM

## 2018-08-28 LAB — T4 FREE SERPL-MCNC: 1.7 NG/DL — SIGNIFICANT CHANGE UP (ref 0.9–1.8)

## 2018-08-28 PROCEDURE — 99233 SBSQ HOSP IP/OBS HIGH 50: CPT

## 2018-08-28 PROCEDURE — 99232 SBSQ HOSP IP/OBS MODERATE 35: CPT

## 2018-08-28 RX ADMIN — Medication 60 MILLIGRAM(S): at 05:36

## 2018-08-28 RX ADMIN — Medication 60 MILLIGRAM(S): at 00:09

## 2018-08-28 RX ADMIN — Medication 60 MILLIGRAM(S): at 21:37

## 2018-08-28 RX ADMIN — Medication 100 MILLIGRAM(S): at 05:37

## 2018-08-28 RX ADMIN — Medication 145 MILLIGRAM(S): at 11:40

## 2018-08-28 RX ADMIN — Medication 40 MILLIGRAM(S): at 05:36

## 2018-08-28 RX ADMIN — LOSARTAN POTASSIUM 50 MILLIGRAM(S): 100 TABLET, FILM COATED ORAL at 05:36

## 2018-08-28 RX ADMIN — LATANOPROST 1 DROP(S): 0.05 SOLUTION/ DROPS OPHTHALMIC; TOPICAL at 21:36

## 2018-08-28 RX ADMIN — Medication 60 MILLIGRAM(S): at 13:45

## 2018-08-28 RX ADMIN — PANTOPRAZOLE SODIUM 40 MILLIGRAM(S): 20 TABLET, DELAYED RELEASE ORAL at 05:37

## 2018-08-28 NOTE — PROGRESS NOTE ADULT - SUBJECTIVE AND OBJECTIVE BOX
Cardiology Follow-up    MARIA ANTONIA CALLOWAY was seen and examined. No events overnight. The patient denies any chest pain, palpitations, orthopnea, PND or abdominal pain.Her shortness of breath is improved,has a non productive cough.    PROBLEM LIST:    PAST MEDICAL HISTORY:  Afib  CAD (coronary artery disease)  CHF (congestive heart failure)  COPD (chronic obstructive pulmonary disease)    PAST SURGICAL HISTORY:  H/O: hysterectomy    MEDICATIONS  (STANDING):  ALBUTerol/ipratropium for Nebulization. 3 milliLiter(s) Nebulizer once  apixaban 2.5 milliGRAM(s) Oral every 12 hours  aspirin  chewable 81 milliGRAM(s) Oral daily  diltiazem    Tablet 60 milliGRAM(s) Oral four times a day  fenofibrate Tablet 145 milliGRAM(s) Oral daily  furosemide   Injectable 40 milliGRAM(s) IV Push daily  latanoprost 0.005% Ophthalmic Solution 1 Drop(s) Both EYES at bedtime  levoFLOXacin IVPB 500 milliGRAM(s) IV Intermittent every 24 hours  levoFLOXacin IVPB      losartan 50 milliGRAM(s) Oral daily  methylPREDNISolone sodium succinate Injectable 60 milliGRAM(s) IV Push three times a day  metoprolol succinate  milliGRAM(s) Oral daily  pantoprazole    Tablet 40 milliGRAM(s) Oral before breakfast    MEDICATIONS  (PRN):  ALBUTerol    90 MICROgram(s) HFA Inhaler 2 Puff(s) Inhalation every 6 hours PRN Shortness of Breath    ALLERGIES:  No Known Allergies    PHYSICAL EXAM:  T(C): 36.8 (18 @ 05:31), Max: 36.8 (18 @ 05:31)  T(F): 98.2 (18 @ 05:31), Max: 98.2 (18 @ 05:31)  HR: 91 (18 @ 05:31) (77 - 139)  BP: 149/93 (18 @ 05:31) (110/71 - 149/93)  RR: 22 (18 @ 05:31) (20 - 28)  SpO2: 99% (18 @ 05:31) (96% - 100%)  Wt(kg): --  I&O's Summary    27 Aug 2018 07:01  -  28 Aug 2018 07:00  --------------------------------------------------------  IN: 240 mL / OUT: 0 mL / NET: 240 mL      Telemetry: atrial fibrillation  General: comfortable, in NAD  Heart: +S1S2, irregularly irregular,2/6 systolic murmur at the LSB, no rubs, no gallops  Lungs: clear to auscultation bilaterally, no wheezes, no rales, no rhonchi  Abdomen: soft, non-tender, non-distended, + bowel sounds  Extremities: no clubbing, no cyanosis, no edema  Neuro: A&O x3    LABS:    Troponin T, Serum: <0.01 ng/mL ( @ 17:14)  Troponin T, Serum: <0.01 ng/mL ( @ 14:25)  Troponin T, Serum: 0.01 ng/mL ( @ 07:52)                            12.2   14.2  )-----------( 326      ( 27 Aug 2018 07:52 )             37.7         130<L>  |  90<L>  |  18.0  ----------------------------<  176<H>  4.2   |  25.0  |  1.68<H>    Ca    9.8      27 Aug 2018 07:52    TPro  7.4  /  Alb  3.8  /  TBili  0.5  /  DBili  x   /  AST  15  /  ALT  <5  /  AlkPhos  43        RADIOLOGY & ADDITIONAL TESTS:    ASSESSMENT:  MARIA ANTONIA CALLOWAY is a 83y old female with a history of chronic atrial fibrillation,renal insufficiency,hyperlipidemia,AR,breast and lung cancer,COPD,essential hypertension who presented with shortness of breath.She has bilateral infiltrates on chest x-ray compatible with pneumonia.    Please permit me to suggest the followin. Continue with antibiotic as ordered  2.Will switch IV Lasix to po Torsemide and follow renal function  3.Increase activity as tolerated

## 2018-08-28 NOTE — PROGRESS NOTE ADULT - ASSESSMENT
Assess    CHF  Rapid Afib: Apparently not new  COPD - mostly restricted  Undiagnosed or Rx'd Lung cancer  Doubt pneumonia - Favor progressive malignancy of RANJAN    Rec    Call Melani Beasley, [place] ([facility] onc)  Called Dr Jones for diagnostic test    FU Echo      Neb  02  Noct Bipap  Lovenox

## 2018-08-28 NOTE — PROGRESS NOTE ADULT - ASSESSMENT
82 y/o female with h/o COPD, CHF, CAD, CKD, h/o left lung nodules with follow up with oncologist. h/o excision of right lung cancer and right breast cancer s/p right lumpectomy. doesnt wear any home o2. with recent hospitalization on  5/29 with a new onset of A Fib and was started on Eliquis initially taht was held on previous dc due to GIB to be resumed later. Patient presents to er with gallego / sob.     > Acute on chronic hypoxic resp failure: likely sec to acute on chronic DCHF and component of COPD exacerbation.   Torsemide 20mg po daily    cardiology consult is following.  Continue torsemide po   pulmonary consult spoke to CT surgery for diagnostic testing for lung cancer.    check tsh     > b/l lower lung possible infiltrates/ possible cap.  Pulm favor lung cancer progression. Possible post obstructive pneumonia.   WBCs mildly elevated but lower than baseline on previous dc   Blood cx no growth. Continue iv abxs     >afib with rvr   hr better controlled now.  po cardizem .   will continue with aspirin and eliquis

## 2018-08-28 NOTE — CONSULT NOTE ADULT - SUBJECTIVE AND OBJECTIVE BOX
History of Present Illness:  HPI:  82 y/o female with h/o COPD, CHF, CAD, CKD, h/o left lung nodules with follow up with oncologist. h/o excision of right lung cancer and right breast cancer s/p right lumpectomy. doesnt wear any home o2. with recent hospitalization on  5/29 with a new onset of A Fib and was started on Eliquis. was called by Dr Polo office (nephrologist ) to go to the ER because hemoglobin is 6.7. was FOB: positive , received blood transfusion , had post transfusion diastolic chf suspected and treated with diuretics. ef good. but also noted copd exacerbation and treated with steroids with quick taper. Patient was dcd on aspirin and Eliquis was initially held on previous dc due to gi bleed.   patient present s today with c/o sob morales with progressive worsening for last 4-5 days, more worse since yesterday, has non productive cough,  no fever or chills. denies n/v/abd pain or diarrhaea. denies cp or palpitations. c/o lower ext edema.     denies any recent falls. denies hematemesis or jose guadalupe, hematochezia. denies dizziness or lightheadedness.   in er noted to be in afib with rvr with hr in 130s-140s.   in er was given iv cardizem 10 mg iv x 1 with hr down to 80s. also given iv solumderol, and lasix. was  palced on bipap briefly. now on nasal canula with o2 sats in low 90s on 4l NC. (27 Aug 2018 13:54)      Current Subjective Assessment:    Past Medical History  Afib  CAD (coronary artery disease)  CHF (congestive heart failure)  COPD (chronic obstructive pulmonary disease)      Past Surgical History  H/O: hysterectomy      MEDICATIONS  (STANDING):  ALBUTerol/ipratropium for Nebulization. 3 milliLiter(s) Nebulizer once  apixaban 2.5 milliGRAM(s) Oral every 12 hours  aspirin  chewable 81 milliGRAM(s) Oral daily  diltiazem    Tablet 60 milliGRAM(s) Oral four times a day  fenofibrate Tablet 145 milliGRAM(s) Oral daily  latanoprost 0.005% Ophthalmic Solution 1 Drop(s) Both EYES at bedtime  levoFLOXacin IVPB 500 milliGRAM(s) IV Intermittent every 24 hours  levoFLOXacin IVPB      losartan 50 milliGRAM(s) Oral daily  methylPREDNISolone sodium succinate Injectable 60 milliGRAM(s) IV Push three times a day  metoprolol succinate  milliGRAM(s) Oral daily  pantoprazole    Tablet 40 milliGRAM(s) Oral before breakfast  torsemide 20 milliGRAM(s) Oral daily    MEDICATIONS  (PRN):  ALBUTerol    90 MICROgram(s) HFA Inhaler 2 Puff(s) Inhalation every 6 hours PRN Shortness of Breath    Antiplatelet therapy:                           Last dose/amt:    Allergies: No Known Allergies      SOCIAL HISTORY:  Smoker: [ ] Yes  [ ] No        PACK YEARS:                         WHEN QUIT?  ETOH use: [ ] Yes  [ ] No              FREQUENCY / QUANTITY:  Ilicit Drug use:  [ ] Yes  [ ] No  Occupation:  Live with:  Assist device use:    PMD:    Relevant Family History  FAMILY HISTORY:  No pertinent family history in first degree relatives      Review of Systems  GENERAL:  Fevers[] chills[] sweats[] fatigue[] weight loss[] weight gain []                                      [ ] NEGATIVE  NEURO:  parathesias[] seizures []  syncope []  confusion []                                                                [ ] NEGATIVE                 EYES: glasses[]  blurry vision[]  discharge[] pain[] glaucoma []                                                           [ ] NEGATIVE                 ENMT:  difficulty hearing []  vertigo[]  dysphagia[] epistaxis[] recent dental work []                     [ ] NEGATIVE                 CV:  chest pain[] palpitations[] MORALES [] diaphoresis [] edema[]                                                           [ ] NEGATIVE                                 RESPIRATORY:  wheezing[] SOB[] cough [] sputum[] hemoptysis[]                                                   [ ] NEGATIVE               GI:  nausea[]  vomiting []  diarrhea[] constipation [] melena []                                                          [ ] NEGATIVE            : hematuria[ ]  dysuria[ ] urgency[] incontinence[]                                                                          [ ] NEGATIVE                    MUSKULOSKELETAL:  arthritis[ ]  joint swelling [ ] muscle weakness [ ]                                            [ ] NEGATIVE                     SKIN/BREAST:  rash[ ] itching [ ]  hair loss[ ] masses[ ]                                                                          [ ] NEGATIVE                     PSYCH:  dementia [ ] depression [ ] anxiety[ ]                                                                                          [ ] NEGATIVE                        HEME/LYMPH:  bruises easily[ ] enlarged lymph nodes[ ] tender lymph nodes[ ]                           [ ] NEGATIVE                      ENDOCRINE:  cold intolerance[ ] heat intolerance[ ] polydipsia[ ]                                                      [ ] NEGATIVE                        Telemetry:    PHYSICAL EXAM  Vital Signs Last 24 Hrs  T(C): 36.3 (28 Aug 2018 15:16), Max: 36.8 (28 Aug 2018 05:31)  T(F): 97.4 (28 Aug 2018 15:16), Max: 98.2 (28 Aug 2018 05:31)  HR: 90 (28 Aug 2018 15:16) (77 - 125)  BP: 105/57 (28 Aug 2018 15:16) (105/57 - 149/93)  BP(mean): --  RR: 20 (28 Aug 2018 15:16) (20 - 22)  SpO2: 100% (28 Aug 2018 11:42) (96% - 100%)    General: Well nourished, well developed, no acute distress.                                                         Neuro: Normal exam oriented to person/place & time with no focal motor or sensory  deficits.                    Eyes: Normal exam of conjunctiva & lids, pupils equally reactive.   ENT: Normal exam of nasal/oral mucosa with absence of cyanosis.   Neck: Normal exam of jugular veins, trachea & thyroid.   Chest: Normal lung exam with good air movement absence of wheezes, rales, or rhonchi:                                                                          CV:  Auscultation: normal [ ] S3[ ] S4[ ] Irregular [ ] Rub[ ] Clicks[ ]  Murmurs none:[ ]systolic [ ]  diastolic [ ] holosystolic [ ]  Carotids: No Bruits[ ] Other____________ Abdominal Aorta: normal [ ] nonpalpable[ ]                                                                         GI: Normal exam of abdomen, liver & spleen with no noted masses or tenderness.                                                                                              Extremities: Normal no evidence of cyanosis or deformity Edema: none[ ]trace[ ]1+[ ]2+[ ]3+[ ]4+[ ]  Lower Extremity Pulses: Right[ ] Left[ ]Varicosities[ ]  SKIN : Normal exam to inspection & palpation.                                                           LABS:                        12.2   14.2  )-----------( 326      ( 27 Aug 2018 07:52 )             37.7     08-27    130<L>  |  90<L>  |  18.0  ----------------------------<  176<H>  4.2   |  25.0  |  1.68<H>    Ca    9.8      27 Aug 2018 07:52    TPro  7.4  /  Alb  3.8  /  TBili  0.5  /  DBili  x   /  AST  15  /  ALT  <5  /  AlkPhos  43  08-27        CARDIAC MARKERS ( 27 Aug 2018 17:14 )  x     / <0.01 ng/mL / 93 U/L / x     / x      CARDIAC MARKERS ( 27 Aug 2018 14:25 )  x     / <0.01 ng/mL / x     / x     / x      CARDIAC MARKERS ( 27 Aug 2018 07:52 )  x     / 0.01 ng/mL / 96 U/L / x     / x History of Present Illness:  HPI:  82 y/o female with h/o COPD, CHF, CAD, CKD, h/o left lung nodules with follow up with oncologist. h/o excision of right lung cancer and right breast cancer s/p right lumpectomy. doesnt wear any home o2. with recent hospitalization on  5/29 with a new onset of A Fib and was started on Eliquis. was called by Dr Polo office (nephrologist ) to go to the ER because hemoglobin is 6.7. was FOB: positive , received blood transfusion , had post transfusion diastolic chf suspected and treated with diuretics. ef good. but also noted copd exacerbation and treated with steroids with quick taper. Patient was dcd on aspirin and Eliquis was initially held on previous dc due to gi bleed.   patient present s today with c/o sob morales with progressive worsening for last 4-5 days, more worse since yesterday, has non productive cough,  no fever or chills. denies n/v/abd pain or diarrhaea. denies cp or palpitations. c/o lower ext edema.     denies any recent falls. denies hematemesis or jose guadalupe, hematochezia. denies dizziness or lightheadedness.   in er noted to be in afib with rvr with hr in 130s-140s.   in er was given iv cardizem 10 mg iv x 1 with hr down to 80s. also given iv solumderol, and lasix. was  palced on bipap briefly. now on nasal canula with o2 sats in low 90s on 4l NC. (27 Aug 2018 13:54)      Current Subjective Assessment: "I feel better since they've treated me for the pneumonia" Mild dyspnea noted while sitting up in bed. Patient awake and alert with no current complaints.    Past Medical History  Afib  CAD (coronary artery disease)  CHF (congestive heart failure)  COPD (chronic obstructive pulmonary disease)  Lung nodules    Past Surgical History  H/O: hysterectomy      MEDICATIONS  (STANDING):  ALBUTerol/ipratropium for Nebulization. 3 milliLiter(s) Nebulizer once  apixaban 2.5 milliGRAM(s) Oral every 12 hours  aspirin  chewable 81 milliGRAM(s) Oral daily  diltiazem    Tablet 60 milliGRAM(s) Oral four times a day  fenofibrate Tablet 145 milliGRAM(s) Oral daily  latanoprost 0.005% Ophthalmic Solution 1 Drop(s) Both EYES at bedtime  levoFLOXacin IVPB 500 milliGRAM(s) IV Intermittent every 24 hours  levoFLOXacin IVPB      losartan 50 milliGRAM(s) Oral daily  methylPREDNISolone sodium succinate Injectable 60 milliGRAM(s) IV Push three times a day  metoprolol succinate  milliGRAM(s) Oral daily  pantoprazole    Tablet 40 milliGRAM(s) Oral before breakfast  torsemide 20 milliGRAM(s) Oral daily    MEDICATIONS  (PRN):  ALBUTerol    90 MICROgram(s) HFA Inhaler 2 Puff(s) Inhalation every 6 hours PRN Shortness of Breath    Antiplatelet therapy:                           Last dose/amt:    Allergies: No Known Allergies      SOCIAL HISTORY:  Smoker: [ ] Yes  [x ] No        PACK YEARS:        quit 20 years ago                 WHEN QUIT?  ETOH use: [ ] Yes  [ x] No              FREQUENCY / QUANTITY:  Ilicit Drug use:  [ ] Yes  [x ] No  Occupation: none  Live with: alone  Assist device use:     Outpatient physicians: [facility]    Relevant Family History  FAMILY HISTORY:  No pertinent family history in first degree relatives      Review of Systems  GENERAL:  Fevers[] chills[] sweats[] fatigue[] weight loss[] weight gain []                                      [x ] NEGATIVE  NEURO:  parathesias[] seizures []  syncope []  confusion []                                                                [ x] NEGATIVE                 EYES: glasses[]  blurry vision[]  discharge[] pain[] glaucoma []                                                           [x ] NEGATIVE                 ENMT:  difficulty hearing []  vertigo[]  dysphagia[] epistaxis[] recent dental work []                     [ x] NEGATIVE                 CV:  chest pain[] palpitations[] MORALES [] diaphoresis [] edema[]                                                           [ x] NEGATIVE                                 RESPIRATORY:  wheezing[x] SOB[x] cough [x] sputum[] hemoptysis[]                                                   [ ] NEGATIVE               GI:  nausea[]  vomiting []  diarrhea[] constipation [] melena []                                                          [x ] NEGATIVE            : hematuria[ ]  dysuria[ ] urgency[] incontinence[]                                                                          [x ] NEGATIVE                    MUSKULOSKELETAL:  arthritis[ ]  joint swelling [ ] muscle weakness [ ]                                            [x ] NEGATIVE                     SKIN/BREAST:  rash[ ] itching [ ]  hair loss[ ] masses[ ]                                                                          [ x] NEGATIVE                     PSYCH:  dementia [ ] depression [ ] anxiety[ ]                                                                                          x] NEGATIVE                        HEME/LYMPH:  bruises easily[ ] enlarged lymph nodes[ ] tender lymph nodes[ ]                           [ x] NEGATIVE                      ENDOCRINE:  cold intolerance[ ] heat intolerance[ ] polydipsia[ ]                                                      [ x] NEGATIVE                        Telemetry: AF 90s    PHYSICAL EXAM  Vital Signs Last 24 Hrs  T(C): 36.3 (28 Aug 2018 15:16), Max: 36.8 (28 Aug 2018 05:31)  T(F): 97.4 (28 Aug 2018 15:16), Max: 98.2 (28 Aug 2018 05:31)  HR: 90 (28 Aug 2018 15:16) (77 - 125)  BP: 105/57 (28 Aug 2018 15:16) (105/57 - 149/93)  BP(mean): --  RR: 20 (28 Aug 2018 15:16) (20 - 22)  SpO2: 100% (28 Aug 2018 11:42) (96% - 100%) O2    General: Well nourished, well developed, no acute distress.                                                         Neuro: Normal exam oriented to person/place & time with no focal motor or sensory  deficits.                    Eyes: Normal exam of conjunctiva & lids, pupils equally reactive.   ENT: Normal exam of nasal/oral mucosa with absence of cyanosis.   Neck: Normal exam of jugular veins, trachea & thyroid.   Chest: Normal lung exam with good air movement absence of wheezes, rales, or rhonchi:                                                                          CV:  Auscultation: normal [ ] S3[ ] S4[ ] Irregular [x ] Rub[ ] Clicks[ ]  Murmurs none:[x ]systolic [ ]  diastolic [ ] holosystolic [ ]  Carotids: No Bruits[x ] Other____________ Abdominal Aorta: normal [ ] nonpalpable[ ]                                                                         GI: Normal exam of abdomen, liver & spleen with no noted masses or tenderness.                                                                                              Extremities: Normal no evidence of cyanosis or deformity Edema: none[ ]trace[ ]1+[ x]2+[ ]3+[ ]4+[ ]  Lower Extremity Pulses: Right[+ ] Left[+ ]Varicosities[ ]  SKIN : Normal exam to inspection & palpation.                                                           LABS:                        12.2   14.2  )-----------( 326      ( 27 Aug 2018 07:52 )             37.7     08-27    130<L>  |  90<L>  |  18.0  ----------------------------<  176<H>  4.2   |  25.0  |  1.68<H>    Ca    9.8      27 Aug 2018 07:52    TPro  7.4  /  Alb  3.8  /  TBili  0.5  /  DBili  x   /  AST  15  /  ALT  <5  /  AlkPhos  43  08-27        CARDIAC MARKERS ( 27 Aug 2018 17:14 )  x     / <0.01 ng/mL / 93 U/L / x     / x      CARDIAC MARKERS ( 27 Aug 2018 14:25 )  x     / <0.01 ng/mL / x     / x     / x      CARDIAC MARKERS ( 27 Aug 2018 07:52 )  x     / 0.01 ng/mL / 96 U/L / x     / x

## 2018-08-28 NOTE — PROGRESS NOTE ADULT - SUBJECTIVE AND OBJECTIVE BOX
CC: Afib. CHF.  COPD . History of lung cancer, breast cancer.   HPI:  82 y/o female with h/o COPD, CHF, CAD, CKD, h/o left lung nodules with follow up with oncologist. h/o excision of right lung cancer and right breast cancer s/p right lumpectomy. doesnt wear any home o2. with recent hospitalization on  5/29 with a new onset of A Fib and was started on Eliquis. was called by Dr Polo office (nephrologist ) to go to the ER because hemoglobin is 6.7. was FOB: positive , received blood transfusion , had post transfusion diastolic chf suspected and treated with diuretics. ef good. but also noted copd exacerbation and treated with steroids with quick taper. Patient was dcd on aspirin and Eliquis was initially held on previous dc due to gi bleed.   patient present s today with c/o sob gallego with progressive worsening for last 4-5 days, more worse since yesterday, has non productive cough,  no fever or chills. denies n/v/abd pain or diarrhaea. denies cp or palpitations. c/o lower ext edema.     denies any recent falls. denies hematemesis or jose guadalupe, hematochezia. denies dizziness or lightheadedness.   in er noted to be in afib with rvr with hr in 130s-140s.   in er was given iv cardizem 10 mg iv x 1 with hr down to 80s. also given iv solumderol, and lasix. was  palced on bipap briefly. now on nasal canula with o2 sats in low 90s on 4l NC. (27 Aug 2018 13:54)    REVIEW OF SYSTEMS:    Patient denied fever, chills, abdominal pain, nausea, vomiting, cough, shortness of breath, chest pain or palpitations    Vital Signs Last 24 Hrs  T(C): 36.4 (28 Aug 2018 10:20), Max: 36.8 (28 Aug 2018 05:31)  T(F): 97.5 (28 Aug 2018 10:20), Max: 98.2 (28 Aug 2018 05:31)  HR: 80 (28 Aug 2018 10:28) (77 - 125)  BP: 123/63 (28 Aug 2018 10:20) (123/63 - 149/93)  BP(mean): --  RR: 20 (28 Aug 2018 10:28) (20 - 22)  SpO2: 100% (28 Aug 2018 10:28) (96% - 100%)I&O's Summary    27 Aug 2018 07:01  -  28 Aug 2018 07:00  --------------------------------------------------------  IN: 240 mL / OUT: 0 mL / NET: 240 mL      PHYSICAL EXAM:  GENERAL: NAD,   HEENT: PERRL, +EOMI, anicteric, no [place]  NECK: Supple, No JVD   CHEST/LUNG: CTA bilaterally; Normal effort  HEART: S1S2 Normal intensity, no murmurs, gallops or rubs noted  ABDOMEN: Soft, BS Normoactive, NT, ND, no HSM noted  EXTREMITIES:  2+ radial and DP pulses noted, no clubbing, cyanosis, or edema noted, Limited mobility   SKIN: No rashes or lesions noted  NEURO: A&Ox3, no focal deficits noted, CN II-XII intact  PSYCH: anxious depressed mood and affect; insight/judgement appropriate  LABS:                        12.2   14.2  )-----------( 326      ( 27 Aug 2018 07:52 )             37.7     08-27    130<L>  |  90<L>  |  18.0  ----------------------------<  176<H>  4.2   |  25.0  |  1.68<H>    Ca    9.8      27 Aug 2018 07:52    TPro  7.4  /  Alb  3.8  /  TBili  0.5  /  DBili  x   /  AST  15  /  ALT  <5  /  AlkPhos  43  08-27        RADIOLOGY & ADDITIONAL TESTS:    MEDICATIONS:  MEDICATIONS  (STANDING):  ALBUTerol/ipratropium for Nebulization. 3 milliLiter(s) Nebulizer once  apixaban 2.5 milliGRAM(s) Oral every 12 hours  aspirin  chewable 81 milliGRAM(s) Oral daily  diltiazem    Tablet 60 milliGRAM(s) Oral four times a day  fenofibrate Tablet 145 milliGRAM(s) Oral daily  latanoprost 0.005% Ophthalmic Solution 1 Drop(s) Both EYES at bedtime  levoFLOXacin IVPB 500 milliGRAM(s) IV Intermittent every 24 hours  levoFLOXacin IVPB      losartan 50 milliGRAM(s) Oral daily  methylPREDNISolone sodium succinate Injectable 60 milliGRAM(s) IV Push three times a day  metoprolol succinate  milliGRAM(s) Oral daily  pantoprazole    Tablet 40 milliGRAM(s) Oral before breakfast  torsemide 20 milliGRAM(s) Oral daily    MEDICATIONS  (PRN):  ALBUTerol    90 MICROgram(s) HFA Inhaler 2 Puff(s) Inhalation every 6 hours PRN Shortness of Breath

## 2018-08-29 PROCEDURE — 99233 SBSQ HOSP IP/OBS HIGH 50: CPT

## 2018-08-29 RX ADMIN — Medication 81 MILLIGRAM(S): at 12:17

## 2018-08-29 RX ADMIN — PANTOPRAZOLE SODIUM 40 MILLIGRAM(S): 20 TABLET, DELAYED RELEASE ORAL at 05:37

## 2018-08-29 RX ADMIN — LOSARTAN POTASSIUM 50 MILLIGRAM(S): 100 TABLET, FILM COATED ORAL at 05:37

## 2018-08-29 RX ADMIN — Medication 100 MILLIGRAM(S): at 05:37

## 2018-08-29 RX ADMIN — Medication 20 MILLIGRAM(S): at 05:37

## 2018-08-29 RX ADMIN — LATANOPROST 1 DROP(S): 0.05 SOLUTION/ DROPS OPHTHALMIC; TOPICAL at 22:12

## 2018-08-29 RX ADMIN — Medication 145 MILLIGRAM(S): at 12:08

## 2018-08-29 RX ADMIN — Medication 60 MILLIGRAM(S): at 05:37

## 2018-08-29 RX ADMIN — APIXABAN 2.5 MILLIGRAM(S): 2.5 TABLET, FILM COATED ORAL at 05:37

## 2018-08-29 NOTE — PROGRESS NOTE ADULT - SUBJECTIVE AND OBJECTIVE BOX
PULMONARY PROGRESS NOTE      MARIA ANTONIA CALLOWAYN-[id number]    Patient is a 83y old  Female who presents with a chief complaint of sob, MORALES (27 Aug 2018 13:54)      INTERVAL HPI/OVERNIGHT EVENTS:  Feels better  Apparently will undergo RT as outpatient  No unusual dyspnea  Observed off O2 Sat>98%    MEDICATIONS  (STANDING):  ALBUTerol/ipratropium for Nebulization. 3 milliLiter(s) Nebulizer once  aspirin  chewable 81 milliGRAM(s) Oral daily  diltiazem    Tablet 60 milliGRAM(s) Oral four times a day  fenofibrate Tablet 145 milliGRAM(s) Oral daily  latanoprost 0.005% Ophthalmic Solution 1 Drop(s) Both EYES at bedtime  levoFLOXacin IVPB 500 milliGRAM(s) IV Intermittent every 24 hours  levoFLOXacin IVPB      losartan 50 milliGRAM(s) Oral daily  metoprolol succinate  milliGRAM(s) Oral daily  pantoprazole    Tablet 40 milliGRAM(s) Oral before breakfast  predniSONE   Tablet 40 milliGRAM(s) Oral daily  torsemide 20 milliGRAM(s) Oral daily      MEDICATIONS  (PRN):  ALBUTerol    90 MICROgram(s) HFA Inhaler 2 Puff(s) Inhalation every 6 hours PRN Shortness of Breath      Allergies    No Known Allergies    Intolerances        PAST MEDICAL & SURGICAL HISTORY:  Afib  CAD (coronary artery disease)  CHF (congestive heart failure)  COPD (chronic obstructive pulmonary disease)  H/O: hysterectomy      SOCIAL HISTORY  Smoking History:       REVIEW OF SYSTEMS:    CONSTITUTIONAL:  No distress    HEENT:  Eyes:  No diplopia or blurred vision. ENT:  No earache, sore throat or runny nose.    CARDIOVASCULAR:  No pressure, squeezing, tightness, heaviness or aching about the chest; no palpitations.    RESPIRATORY: Per HPI, baseline MORALES    GASTROINTESTINAL:  No nausea, vomiting or diarrhea.    GENITOURINARY:  No dysuria, frequency or urgency.    MUSCULOSKELETAL:  No joint pain    SKIN:  No new lesions.    NEUROLOGIC:  No paresthesias, fasciculations, seizures or weakness.    PSYCHIATRIC:  No disorder of thought or mood.    ENDOCRINE:  No heat or cold intolerance, polyuria or polydipsia.    HEMATOLOGICAL:  No easy bruising or bleeding.     Vital Signs Last 24 Hrs  T(C): 36.3 (29 Aug 2018 10:14), Max: 36.4 (28 Aug 2018 21:31)  T(F): 97.3 (29 Aug 2018 10:14), Max: 97.6 (28 Aug 2018 21:31)  HR: 103 (29 Aug 2018 12:10) (71 - 107)  BP: 138/88 (29 Aug 2018 12:10) (105/57 - 138/88)  BP(mean): --  RR: 20 (29 Aug 2018 10:14) (18 - 20)  SpO2: 97% (29 Aug 2018 10:14) (97% - 100%)    PHYSICAL EXAMINATION:    GENERAL: The patient is awake and alert in no apparent distress.     HEENT: Head is normocephalic and atraumatic. Extraocular muscles are intact. Mucous membranes are moist.    NECK: Supple.    LUNGS: Clear to auscultation without wheezing, rales or rhonchi; respirations unlabored>diminished on left    HEART: Regular rate and rhythm without murmur.    ABDOMEN: Soft, nontender, and nondistended.      EXTREMITIES: Without any cyanosis, clubbing, rash, lesions or edema.    NEUROLOGIC: Grossly intact.    SKIN: No ulceration or induration present.      LABS:              ABG - ( 27 Aug 2018 16:27 )  pH, Arterial: 7.44  pH, Blood: x     /  pCO2: 41    /  pO2: 97    / HCO3: 27    / Base Excess: 3.4   /  SaO2: 98                CARDIAC MARKERS ( 27 Aug 2018 17:14 )  x     / <0.01 ng/mL / 93 U/L / x     / x      CARDIAC MARKERS ( 27 Aug 2018 14:25 )  x     / <0.01 ng/mL / x     / x     / x            Serum Pro-Brain Natriuretic Peptide: 9188 pg/mL (08-27-18 @ 07:52)      Procalcitonin, Serum: 0.11 ng/mL (08-27-18 @ 20:14)      MICROBIOLOGY:    RADIOLOGY & ADDITIONAL STUDIES:

## 2018-08-29 NOTE — PROGRESS NOTE ADULT - ASSESSMENT
84 y/o female with h/o COPD, CHF, CAD, CKD, h/o left lung nodules with follow up with oncologist. h/o excision of right lung cancer and right breast cancer s/p right lumpectomy. doesnt wear any home o2. with recent hospitalization on  5/29 with a new onset of A Fib and was started on Eliquis initially that  was held on previous dc due to GIB to be resumed later. Patient presents to er with glalego / sob.     Acute on chronic hypoxic resp failure: likely sec to acute on chronic Diastolic CHF and component of COPD exacerbation.   Torsemide 20mg po daily      Tsh is normal value    Pneumonia  . b/l lower lung possible infiltrates/ possible cap.  Pulm favor lung cancer progression. Possible post obstructive pneumonia. Procalcitonin if greater than 0.1. Will continue abx levaquin and transition to po for discharge.   Leukocytosis is resolving    Blood cx no growth.    >afib with rvr   hr better controlled now.  po cardizem .   will continue with aspirin and eliquis . Patient does not like to take ekiquis for history of GI  bleed .     Copd -   Cooper steroid . Pulm is following

## 2018-08-29 NOTE — PROGRESS NOTE ADULT - ASSESSMENT
Ms. Marie is a 84 yo female with a history of essential hypertension, chronic atrial fibrillation, hyperlipidemia, renal insufficiency, breast and lung cancer, AR, COPD, who presents with shortness of breath for the last several days with minimal activity.    1. Dyspnea is likely multifactorial, due to COPD, rapid atrial fibrillation, mild volume overload, and possible progression of coronary disease. She has significant history of bleeding with anticoagulation. Ms. Marie is a 82 yo female with a history of CAD s/p remote PCI, essential hypertension, chronic atrial fibrillation, hyperlipidemia, renal insufficiency, breast and lung cancer, AR, COPD, who presents with shortness of breath for the last several days with minimal activity.    1. Dyspnea is likely multifactorial, due to COPD, rapid atrial fibrillation, mild volume overload, and possible progression of coronary disease. She remains hypoxic requiring O2. Would continue empiric diuresis, although no evidence of significant hypervolemia on exam or imaging. Continue nebs, steroids, and antibiotic treatment per Pulmonary. Would consider outpatient ischemia evaluation however would need to clarify oncologic prognosis.   2. Continue BB and CCB for now for rate control. Poor candidate for anticoagulation, required blood transfusion while on Eliquis.  3. Continue Aspirin for CAD s/p remote PCI. Ms. Marie is a 84 yo female with a history of CAD s/p remote PCI, essential hypertension, chronic atrial fibrillation, hyperlipidemia, renal insufficiency, breast and lung cancer, AR, COPD, who presents with shortness of breath for the last several days with minimal activity.    1. Dyspnea is likely multifactorial, due to COPD, rapid atrial fibrillation, mild volume overload, and possible progression of coronary disease. She remains hypoxic requiring O2. Would continue empiric diuresis, although no evidence of significant hypervolemia on exam or imaging. Continue nebs, steroids, and antibiotic treatment per Pulmonary. Would consider outpatient ischemia evaluation however would need to clarify oncologic prognosis.   2. Continue BB and CCB for now for rate control. Poor candidate for anticoagulation, required blood transfusion while on Eliquis. Will monitor for now with a low threshold for discontinuing.  3. Continue Aspirin for CAD s/p remote PCI.    Thank you for allowing us to participate in the care of your patient.    [facility]   [phone] Ms. Marie is a 84 yo female with a history of CAD s/p remote PCI, essential hypertension, chronic atrial fibrillation, hyperlipidemia, renal insufficiency, breast and lung cancer, AR, COPD, who presents with shortness of breath for the last several days with minimal activity.    1. Dyspnea is likely multifactorial, due to COPD, rapid atrial fibrillation, mild volume overload, and possible progression of coronary disease. She remains hypoxic requiring O2. Would continue empiric diuresis, although no evidence of significant hypervolemia on exam or imaging. Continue nebs, steroids, and antibiotic treatment per Pulmonary. Would consider outpatient ischemia evaluation however would need to clarify oncologic prognosis.   2. Continue BB and CCB for now for rate control. Poor candidate for anticoagulation, required blood transfusion while on Eliquis. Will discontinue Eliquis.  3. Continue Aspirin for CAD s/p remote PCI.    Thank you for allowing us to participate in the care of your patient.    [facility]   [phone]

## 2018-08-29 NOTE — PROGRESS NOTE ADULT - SUBJECTIVE AND OBJECTIVE BOX
CC: Shortness of breath is resolving.  COPD , lung cancer , breast cancer . Afib.   HPI:  84 y/o female with h/o COPD, CHF, CAD, CKD, h/o left lung nodules with follow up with oncologist. h/o excision of right lung cancer and right breast cancer s/p right lumpectomy. doesnt wear any home o2. with recent hospitalization on  5/29 with a new onset of A Fib and was started on Eliquis. was called by Dr Polo office (nephrologist ) to go to the ER because hemoglobin is 6.7. was FOB: positive , received blood transfusion , had post transfusion diastolic chf suspected and treated with diuretics. ef good. but also noted copd exacerbation and treated with steroids with quick taper. Patient was dcd on aspirin and Eliquis was initially held on previous dc due to gi bleed.   patient present s today with c/o sob gallego with progressive worsening for last 4-5 days, more worse since yesterday, has non productive cough,  no fever or chills. denies n/v/abd pain or diarrhaea. denies cp or palpitations. c/o lower ext edema.     denies any recent falls. denies hematemesis or jose guadalupe, hematochezia. denies dizziness or lightheadedness.   in er noted to be in afib with rvr with hr in 130s-140s.   in er was given iv cardizem 10 mg iv x 1 with hr down to 80s. also given iv solumderol, and lasix. was  palced on bipap briefly. now on nasal canula with o2 sats in low 90s on 4l NC. (27 Aug 2018 13:54)    REVIEW OF SYSTEMS:    Patient denied fever, chills, abdominal pain, nausea, vomiting, cough, shortness of breath, chest pain or palpitations    Vital Signs Last 24 Hrs  T(C): 36.3 (29 Aug 2018 10:14), Max: 36.4 (28 Aug 2018 21:31)  T(F): 97.3 (29 Aug 2018 10:14), Max: 97.6 (28 Aug 2018 21:31)  HR: 103 (29 Aug 2018 12:10) (71 - 107)  BP: 138/88 (29 Aug 2018 12:10) (112/58 - 138/88)  BP(mean): --  RR: 20 (29 Aug 2018 10:14) (18 - 20)  SpO2: 97% (29 Aug 2018 10:14) (97% - 100%)I&O's Summary    28 Aug 2018 07:01  -  29 Aug 2018 07:00  --------------------------------------------------------  IN: 370 mL / OUT: 525 mL / NET: -155 mL      PHYSICAL EXAM:  GENERAL: NAD,   HEENT: PERRL, +EOMI, anicteric, no [place]  NECK: Supple, No JVD   CHEST/LUNG: bilateral rales   HEART: S1S2 Normal intensity, no murmurs, gallops or rubs noted  ABDOMEN: Soft, BS Normoactive, NT, ND, no HSM noted  EXTREMITIES:  2+ radial and DP pulses noted, no clubbing, cyanosis, or edema noted, Limited mobility   SKIN: No rashes or lesions noted  NEURO: A&Ox3, no focal deficits noted, CN II-XII intact  PSYCH: Depressed mood and affect; insight/judgement appropriate  LABS:        RADIOLOGY & ADDITIONAL TESTS:    MEDICATIONS:  MEDICATIONS  (STANDING):  ALBUTerol/ipratropium for Nebulization. 3 milliLiter(s) Nebulizer once  aspirin  chewable 81 milliGRAM(s) Oral daily  diltiazem    Tablet 60 milliGRAM(s) Oral four times a day  fenofibrate Tablet 145 milliGRAM(s) Oral daily  latanoprost 0.005% Ophthalmic Solution 1 Drop(s) Both EYES at bedtime  levoFLOXacin IVPB 500 milliGRAM(s) IV Intermittent every 24 hours  levoFLOXacin IVPB      losartan 50 milliGRAM(s) Oral daily  metoprolol succinate  milliGRAM(s) Oral daily  pantoprazole    Tablet 40 milliGRAM(s) Oral before breakfast  predniSONE   Tablet 40 milliGRAM(s) Oral daily  torsemide 20 milliGRAM(s) Oral daily    MEDICATIONS  (PRN):  ALBUTerol    90 MICROgram(s) HFA Inhaler 2 Puff(s) Inhalation every 6 hours PRN Shortness of Breath

## 2018-08-29 NOTE — PROGRESS NOTE ADULT - ASSESSMENT
Patient with progressive left mid lung mass with airway involvement.   Review of office PFT's>no significant obstructive impairment but has been losing FVC over time likely related to degree of tumor involvement left lung and as well with effusions.   ?malignant effusions vs secondary to that related to afib now controlled.  No good evidence of pneumonia.      Plan:  1.Decreased steroids>prednisone 40 per day and taper as outpatient.  2.No need for O2 at this time  3.Procalcitonin>if less than 0.1 would d/c Levaquin  4.F/u with oncology/RT as planned  5.F/u with  as outpatient.

## 2018-08-29 NOTE — PROGRESS NOTE ADULT - SUBJECTIVE AND OBJECTIVE BOX
Cardiology Follow-up    MARIA ANTONIA CALLOWAY was seen and examined. No events overnight. The patient denies any chest pain, SOB, palpitations, orthopnea, PND or abdominal pain.    PROBLEM LIST:  Lung nodule    PAST MEDICAL HISTORY:  Afib  CAD (coronary artery disease)  CHF (congestive heart failure)  COPD (chronic obstructive pulmonary disease)    PAST SURGICAL HISTORY:  H/O: hysterectomy    MEDICATIONS  (STANDING):  ALBUTerol/ipratropium for Nebulization. 3 milliLiter(s) Nebulizer once  apixaban 2.5 milliGRAM(s) Oral every 12 hours  aspirin  chewable 81 milliGRAM(s) Oral daily  diltiazem    Tablet 60 milliGRAM(s) Oral four times a day  fenofibrate Tablet 145 milliGRAM(s) Oral daily  latanoprost 0.005% Ophthalmic Solution 1 Drop(s) Both EYES at bedtime  levoFLOXacin IVPB 500 milliGRAM(s) IV Intermittent every 24 hours  levoFLOXacin IVPB      losartan 50 milliGRAM(s) Oral daily  methylPREDNISolone sodium succinate Injectable 60 milliGRAM(s) IV Push three times a day  metoprolol succinate  milliGRAM(s) Oral daily  pantoprazole    Tablet 40 milliGRAM(s) Oral before breakfast  torsemide 20 milliGRAM(s) Oral daily    MEDICATIONS  (PRN):  ALBUTerol    90 MICROgram(s) HFA Inhaler 2 Puff(s) Inhalation every 6 hours PRN Shortness of Breath    ALLERGIES:  No Known Allergies    I&O's Summary    28 Aug 2018 07:01  -  29 Aug 2018 07:00  --------------------------------------------------------  IN: 370 mL / OUT: 525 mL / NET: -155 mL      PHYSICAL EXAM:  T(F): 97.4 (08-29-18 @ 05:34), Max: 97.6 (08-28-18 @ 21:31)  HR: 83 (08-29-18 @ 05:34) (71 - 107)  BP: 132/62 (08-29-18 @ 05:34) (105/57 - 132/62)  RR: 18 (08-29-18 @ 05:34) (18 - 20)  SpO2: 100% (08-29-18 @ 05:34) (98% - 100%)  Wt(kg): --    Telemetry: ***  General: comfortable, in NAD  Heart: +S1S2, RRR, no murmurs, no rubs, no gallops  Lungs: clear to auscultation bilaterally, no wheezes, no rales, no rhonchi  Abdomen: soft, non-tender, non-distended, + bowel sounds  Extremities: no clubbing, no cyanosis, no edema  Neuro: A&O x3    LABS:    Troponin T, Serum: <0.01 ng/mL (08-27 @ 17:14)  Troponin T, Serum: <0.01 ng/mL (08-27 @ 14:25)  Troponin T, Serum: 0.01 ng/mL (08-27 @ 07:52)        CBC:            12.2   14.2  >-----------< 326    08-27 @ 07:52            37.7       CHEMISTRY:   130   |  90     |  18.0   ----------------------------< 176     08-27 @ 07:52    4.2   |  25.0   |  1.68     eGFR non-  28     eGFR   32         TPro --    / Alb 3.8   / TBili 0.5   / DBili --    / AST 15    / ALT <5    / AlkPhos --     08-27 @ 07:52        RADIOLOGY & ADDITIONAL TESTS:  CT Chest: perihilar cavitary lesion, small pleural effusion  TTE: LVEF 40-45% with inferolateral, anterolateral hypokinesis, AV sclerosis, mild to moderate AI, moderate MR Cardiology Follow-up    MARIA ANTONIA CALLOWAY was seen and examined. No events overnight. The patient denies any chest pain, SOB, palpitations, orthopnea, PND or abdominal pain.    PROBLEM LIST:  Lung nodule    PAST MEDICAL HISTORY:  Afib  CAD (coronary artery disease)  CHF (congestive heart failure)  COPD (chronic obstructive pulmonary disease)    PAST SURGICAL HISTORY:  H/O: hysterectomy    MEDICATIONS  (STANDING):  ALBUTerol/ipratropium for Nebulization. 3 milliLiter(s) Nebulizer once  apixaban 2.5 milliGRAM(s) Oral every 12 hours  aspirin  chewable 81 milliGRAM(s) Oral daily  diltiazem    Tablet 60 milliGRAM(s) Oral four times a day  fenofibrate Tablet 145 milliGRAM(s) Oral daily  latanoprost 0.005% Ophthalmic Solution 1 Drop(s) Both EYES at bedtime  levoFLOXacin IVPB 500 milliGRAM(s) IV Intermittent every 24 hours  levoFLOXacin IVPB      losartan 50 milliGRAM(s) Oral daily  methylPREDNISolone sodium succinate Injectable 60 milliGRAM(s) IV Push three times a day  metoprolol succinate  milliGRAM(s) Oral daily  pantoprazole    Tablet 40 milliGRAM(s) Oral before breakfast  torsemide 20 milliGRAM(s) Oral daily    MEDICATIONS  (PRN):  ALBUTerol    90 MICROgram(s) HFA Inhaler 2 Puff(s) Inhalation every 6 hours PRN Shortness of Breath    ALLERGIES:  No Known Allergies    I&O's Summary    28 Aug 2018 07:01  -  29 Aug 2018 07:00  --------------------------------------------------------  IN: 370 mL / OUT: 525 mL / NET: -155 mL      PHYSICAL EXAM:  T(F): 97.4 (08-29-18 @ 05:34), Max: 97.6 (08-28-18 @ 21:31)  HR: 83 (08-29-18 @ 05:34) (71 - 107)  BP: 132/62 (08-29-18 @ 05:34) (105/57 - 132/62)  RR: 18 (08-29-18 @ 05:34) (18 - 20)  SpO2: 100% (08-29-18 @ 05:34) (98% - 100%)  Wt(kg): --    Telemetry: atrial fibrillation  General: mildly dyspneic, in NAD  Heart: +S1S2, irreg irreg, 2/6 systolic murmur apex, no rubs, no gallops  Lungs: decreased air entry, no wheezes, +b/l basilar crackles  Abdomen: soft, non-tender, non-distended, + bowel sounds  Extremities: no clubbing, no cyanosis, no edema  Neuro: A&O x3    LABS:    Troponin T, Serum: <0.01 ng/mL (08-27 @ 17:14)  Troponin T, Serum: <0.01 ng/mL (08-27 @ 14:25)  Troponin T, Serum: 0.01 ng/mL (08-27 @ 07:52)        CBC:            12.2   14.2  >-----------< 326    08-27 @ 07:52            37.7       CHEMISTRY:   130   |  90     |  18.0   ----------------------------< 176     08-27 @ 07:52    4.2   |  25.0   |  1.68     eGFR non-  28     eGFR   32         TPro --    / Alb 3.8   / TBili 0.5   / DBili --    / AST 15    / ALT <5    / AlkPhos --     08-27 @ 07:52        RADIOLOGY & ADDITIONAL TESTS:  CT Chest: perihilar cavitary lesion, small pleural effusion  TTE: LVEF 40-45% with inferolateral, anterolateral hypokinesis, AV sclerosis, mild to moderate AI, moderate MR Cardiology Follow-up    MARIA ANTONIA CALLOWAY was seen and examined. No events overnight. The patient denies any chest pain, palpitations, orthopnea, PND or abdominal pain. Remains SOB and has a dry cough.    PROBLEM LIST:  Lung nodule    PAST MEDICAL HISTORY:  Afib  CAD (coronary artery disease)  CHF (congestive heart failure)  COPD (chronic obstructive pulmonary disease)    PAST SURGICAL HISTORY:  H/O: hysterectomy    MEDICATIONS  (STANDING):  ALBUTerol/ipratropium for Nebulization. 3 milliLiter(s) Nebulizer once  apixaban 2.5 milliGRAM(s) Oral every 12 hours  aspirin  chewable 81 milliGRAM(s) Oral daily  diltiazem    Tablet 60 milliGRAM(s) Oral four times a day  fenofibrate Tablet 145 milliGRAM(s) Oral daily  latanoprost 0.005% Ophthalmic Solution 1 Drop(s) Both EYES at bedtime  levoFLOXacin IVPB 500 milliGRAM(s) IV Intermittent every 24 hours  levoFLOXacin IVPB      losartan 50 milliGRAM(s) Oral daily  methylPREDNISolone sodium succinate Injectable 60 milliGRAM(s) IV Push three times a day  metoprolol succinate  milliGRAM(s) Oral daily  pantoprazole    Tablet 40 milliGRAM(s) Oral before breakfast  torsemide 20 milliGRAM(s) Oral daily    MEDICATIONS  (PRN):  ALBUTerol    90 MICROgram(s) HFA Inhaler 2 Puff(s) Inhalation every 6 hours PRN Shortness of Breath    ALLERGIES:  No Known Allergies    I&O's Summary    28 Aug 2018 07:01  -  29 Aug 2018 07:00  --------------------------------------------------------  IN: 370 mL / OUT: 525 mL / NET: -155 mL      PHYSICAL EXAM:  T(F): 97.4 (08-29-18 @ 05:34), Max: 97.6 (08-28-18 @ 21:31)  HR: 83 (08-29-18 @ 05:34) (71 - 107)  BP: 132/62 (08-29-18 @ 05:34) (105/57 - 132/62)  RR: 18 (08-29-18 @ 05:34) (18 - 20)  SpO2: 100% (08-29-18 @ 05:34) (98% - 100%)  Wt(kg): --    Telemetry: atrial fibrillation  General: mildly dyspneic, in NAD  Heart: +S1S2, irreg irreg, 2/6 systolic murmur apex, no rubs, no gallops  Lungs: decreased air entry, no wheezes, +b/l basilar crackles  Abdomen: soft, non-tender, non-distended, + bowel sounds  Extremities: no clubbing, no cyanosis, no edema  Neuro: A&O x3    LABS:    Troponin T, Serum: <0.01 ng/mL (08-27 @ 17:14)  Troponin T, Serum: <0.01 ng/mL (08-27 @ 14:25)  Troponin T, Serum: 0.01 ng/mL (08-27 @ 07:52)        CBC:            12.2   14.2  >-----------< 326    08-27 @ 07:52            37.7       CHEMISTRY:   130   |  90     |  18.0   ----------------------------< 176     08-27 @ 07:52    4.2   |  25.0   |  1.68     eGFR non-  28     eGFR   32         TPro --    / Alb 3.8   / TBili 0.5   / DBili --    / AST 15    / ALT <5    / AlkPhos --     08-27 @ 07:52        RADIOLOGY & ADDITIONAL TESTS:  CT Chest: perihilar cavitary lesion, small pleural effusion  TTE: LVEF 40-45% with inferolateral, anterolateral hypokinesis, AV sclerosis, mild to moderate AI, moderate MR

## 2018-08-30 ENCOUNTER — TRANSCRIPTION ENCOUNTER (OUTPATIENT)
Age: 83
End: 2018-08-30

## 2018-08-30 VITALS
OXYGEN SATURATION: 97 % | HEART RATE: 82 BPM | RESPIRATION RATE: 18 BRPM | SYSTOLIC BLOOD PRESSURE: 108 MMHG | DIASTOLIC BLOOD PRESSURE: 68 MMHG

## 2018-08-30 LAB
ANION GAP SERPL CALC-SCNC: 14 MMOL/L — SIGNIFICANT CHANGE UP (ref 5–17)
BUN SERPL-MCNC: 51 MG/DL — HIGH (ref 8–20)
CALCIUM SERPL-MCNC: 9.3 MG/DL — SIGNIFICANT CHANGE UP (ref 8.6–10.2)
CHLORIDE SERPL-SCNC: 92 MMOL/L — LOW (ref 98–107)
CO2 SERPL-SCNC: 25 MMOL/L — SIGNIFICANT CHANGE UP (ref 22–29)
CREAT SERPL-MCNC: 1.84 MG/DL — HIGH (ref 0.5–1.3)
GLUCOSE SERPL-MCNC: 156 MG/DL — HIGH (ref 70–115)
HCT VFR BLD CALC: 37.9 % — SIGNIFICANT CHANGE UP (ref 37–47)
HGB BLD-MCNC: 12.2 G/DL — SIGNIFICANT CHANGE UP (ref 12–16)
MCHC RBC-ENTMCNC: 26.3 PG — LOW (ref 27–31)
MCHC RBC-ENTMCNC: 32.2 G/DL — SIGNIFICANT CHANGE UP (ref 32–36)
MCV RBC AUTO: 81.9 FL — SIGNIFICANT CHANGE UP (ref 81–99)
PLATELET # BLD AUTO: 293 K/UL — SIGNIFICANT CHANGE UP (ref 150–400)
POTASSIUM SERPL-MCNC: 4.2 MMOL/L — SIGNIFICANT CHANGE UP (ref 3.5–5.3)
POTASSIUM SERPL-SCNC: 4.2 MMOL/L — SIGNIFICANT CHANGE UP (ref 3.5–5.3)
PROCALCITONIN SERPL-MCNC: 0.06 NG/ML — HIGH (ref 0–0.04)
RBC # BLD: 4.63 M/UL — SIGNIFICANT CHANGE UP (ref 4.4–5.2)
RBC # FLD: 16 % — HIGH (ref 11–15.6)
SODIUM SERPL-SCNC: 131 MMOL/L — LOW (ref 135–145)
TROPONIN T SERPL-MCNC: <0.01 NG/ML — SIGNIFICANT CHANGE UP (ref 0–0.06)
WBC # BLD: 11.8 K/UL — HIGH (ref 4.8–10.8)
WBC # FLD AUTO: 11.8 K/UL — HIGH (ref 4.8–10.8)

## 2018-08-30 PROCEDURE — 80048 BASIC METABOLIC PNL TOTAL CA: CPT

## 2018-08-30 PROCEDURE — 84436 ASSAY OF TOTAL THYROXINE: CPT

## 2018-08-30 PROCEDURE — 99223 1ST HOSP IP/OBS HIGH 75: CPT

## 2018-08-30 PROCEDURE — 99292 CRITICAL CARE ADDL 30 MIN: CPT | Mod: 25

## 2018-08-30 PROCEDURE — 99239 HOSP IP/OBS DSCHRG MGMT >30: CPT

## 2018-08-30 PROCEDURE — 96376 TX/PRO/DX INJ SAME DRUG ADON: CPT

## 2018-08-30 PROCEDURE — 99291 CRITICAL CARE FIRST HOUR: CPT | Mod: 25

## 2018-08-30 PROCEDURE — 82550 ASSAY OF CK (CPK): CPT

## 2018-08-30 PROCEDURE — 84439 ASSAY OF FREE THYROXINE: CPT

## 2018-08-30 PROCEDURE — 96375 TX/PRO/DX INJ NEW DRUG ADDON: CPT

## 2018-08-30 PROCEDURE — 96374 THER/PROPH/DIAG INJ IV PUSH: CPT

## 2018-08-30 PROCEDURE — 93970 EXTREMITY STUDY: CPT

## 2018-08-30 PROCEDURE — 84484 ASSAY OF TROPONIN QUANT: CPT

## 2018-08-30 PROCEDURE — 94660 CPAP INITIATION&MGMT: CPT

## 2018-08-30 PROCEDURE — 71250 CT THORAX DX C-: CPT

## 2018-08-30 PROCEDURE — 82803 BLOOD GASES ANY COMBINATION: CPT

## 2018-08-30 PROCEDURE — 97163 PT EVAL HIGH COMPLEX 45 MIN: CPT

## 2018-08-30 PROCEDURE — 93306 TTE W/DOPPLER COMPLETE: CPT

## 2018-08-30 PROCEDURE — 71045 X-RAY EXAM CHEST 1 VIEW: CPT

## 2018-08-30 PROCEDURE — 83880 ASSAY OF NATRIURETIC PEPTIDE: CPT

## 2018-08-30 PROCEDURE — 94760 N-INVAS EAR/PLS OXIMETRY 1: CPT

## 2018-08-30 PROCEDURE — 84480 ASSAY TRIIODOTHYRONINE (T3): CPT

## 2018-08-30 PROCEDURE — 36415 COLL VENOUS BLD VENIPUNCTURE: CPT

## 2018-08-30 PROCEDURE — 80053 COMPREHEN METABOLIC PANEL: CPT

## 2018-08-30 PROCEDURE — 85027 COMPLETE CBC AUTOMATED: CPT

## 2018-08-30 PROCEDURE — 84443 ASSAY THYROID STIM HORMONE: CPT

## 2018-08-30 PROCEDURE — 84145 PROCALCITONIN (PCT): CPT

## 2018-08-30 RX ORDER — DILTIAZEM HCL 120 MG
0 CAPSULE, EXT RELEASE 24 HR ORAL
Qty: 0 | Refills: 0 | COMMUNITY

## 2018-08-30 RX ORDER — FERROUS GLUCONATE 100 %
0 POWDER (GRAM) MISCELLANEOUS
Qty: 0 | Refills: 0 | COMMUNITY

## 2018-08-30 RX ORDER — METOPROLOL TARTRATE 50 MG
1 TABLET ORAL
Qty: 0 | Refills: 0 | COMMUNITY

## 2018-08-30 RX ORDER — CIPROFLOXACIN LACTATE 400MG/40ML
1 VIAL (ML) INTRAVENOUS
Qty: 5 | Refills: 0 | OUTPATIENT
Start: 2018-08-30 | End: 2018-09-03

## 2018-08-30 RX ORDER — ASPIRIN/CALCIUM CARB/MAGNESIUM 324 MG
1 TABLET ORAL
Qty: 30 | Refills: 0 | OUTPATIENT
Start: 2018-08-30 | End: 2018-09-28

## 2018-08-30 RX ORDER — DILTIAZEM HCL 120 MG
1 CAPSULE, EXT RELEASE 24 HR ORAL
Qty: 60 | Refills: 0 | OUTPATIENT
Start: 2018-08-30 | End: 2018-09-28

## 2018-08-30 RX ORDER — METOPROLOL TARTRATE 50 MG
1 TABLET ORAL
Qty: 30 | Refills: 0 | OUTPATIENT
Start: 2018-08-30 | End: 2018-09-28

## 2018-08-30 RX ORDER — TIOTROPIUM BROMIDE 18 UG/1
1 CAPSULE ORAL; RESPIRATORY (INHALATION)
Qty: 30 | Refills: 0 | OUTPATIENT
Start: 2018-08-30 | End: 2018-09-28

## 2018-08-30 RX ORDER — SACCHAROMYCES BOULARDII 250 MG
250 POWDER IN PACKET (EA) ORAL
Qty: 0 | Refills: 0 | Status: DISCONTINUED | OUTPATIENT
Start: 2018-08-30 | End: 2018-08-30

## 2018-08-30 RX ORDER — METOPROLOL TARTRATE 50 MG
1 TABLET ORAL
Qty: 0 | Refills: 0 | COMMUNITY
Start: 2018-08-30 | End: 2018-09-28

## 2018-08-30 RX ADMIN — Medication 20 MILLIGRAM(S): at 05:40

## 2018-08-30 RX ADMIN — PANTOPRAZOLE SODIUM 40 MILLIGRAM(S): 20 TABLET, DELAYED RELEASE ORAL at 05:39

## 2018-08-30 RX ADMIN — Medication 40 MILLIGRAM(S): at 05:42

## 2018-08-30 RX ADMIN — Medication 81 MILLIGRAM(S): at 11:52

## 2018-08-30 RX ADMIN — LOSARTAN POTASSIUM 50 MILLIGRAM(S): 100 TABLET, FILM COATED ORAL at 05:39

## 2018-08-30 RX ADMIN — Medication 100 MILLIGRAM(S): at 05:39

## 2018-08-30 NOTE — PROGRESS NOTE ADULT - SUBJECTIVE AND OBJECTIVE BOX
Cardiology Follow-up    MARIA ANTONIA CALLOWAY was seen and examined. No events overnight. The patient denies any chest pain, palpitations, orthopnea, PND or abdominal pain. SOB and cough slightly improved. Remained off O2 last night. Wants to go home.    PROBLEM LIST:  Lung nodule    PAST MEDICAL HISTORY:  Afib  CAD (coronary artery disease)  CHF (congestive heart failure)  COPD (chronic obstructive pulmonary disease)    PAST SURGICAL HISTORY:  H/O: hysterectomy    MEDICATIONS  (STANDING):  ALBUTerol/ipratropium for Nebulization. 3 milliLiter(s) Nebulizer once  aspirin  chewable 81 milliGRAM(s) Oral daily  diltiazem    Tablet 120 milliGRAM(s) Oral two times a day  fenofibrate Tablet 145 milliGRAM(s) Oral daily  latanoprost 0.005% Ophthalmic Solution 1 Drop(s) Both EYES at bedtime  levoFLOXacin IVPB 500 milliGRAM(s) IV Intermittent every 24 hours  levoFLOXacin IVPB      losartan 50 milliGRAM(s) Oral daily  metoprolol succinate  milliGRAM(s) Oral daily  pantoprazole    Tablet 40 milliGRAM(s) Oral before breakfast  predniSONE   Tablet 40 milliGRAM(s) Oral daily  torsemide 20 milliGRAM(s) Oral daily    MEDICATIONS  (PRN):  ALBUTerol    90 MICROgram(s) HFA Inhaler 2 Puff(s) Inhalation every 6 hours PRN Shortness of Breath    ALLERGIES:  No Known Allergies    I&O's Summary    29 Aug 2018 07:01  -  30 Aug 2018 07:00  --------------------------------------------------------  IN: 480 mL / OUT: 0 mL / NET: 480 mL      PHYSICAL EXAM:  T(F): 97.8 (08-30-18 @ 05:34), Max: 97.8 (08-30-18 @ 05:34)  HR: 95 (08-30-18 @ 05:34) (81 - 103)  BP: 140/60 (08-30-18 @ 05:34) (100/52 - 148/60)  RR: 16 (08-30-18 @ 05:34) (16 - 20)  SpO2: 95% (08-30-18 @ 05:34) (95% - 97%)  Wt(kg): --    Telemetry: afib , PVCs  General: comfortable, in NAD  HEENT: No JVD  Heart: +S1S2, irreg irreg, 2/6 systolic murmur apex, no rubs, no gallops  Lungs: decreased air entry bilaterally, scattered rales and rhonchi  Abdomen: soft, non-tender, non-distended, + bowel sounds  Extremities: no clubbing, no cyanosis, no edema  Neuro: A&O x3    LABS:    Troponin T, Serum: <0.01 ng/mL (08-30 @ 02:17)  Troponin T, Serum: <0.01 ng/mL (08-27 @ 17:14)  Troponin T, Serum: <0.01 ng/mL (08-27 @ 14:25)        CBC:            12.2   11.8  >-----------< 293    08-30 @ 02:18            37.9               12.2   14.2  >-----------< 326    08-27 @ 07:52            37.7       CHEMISTRY:   131   |  92     |  51.0   ----------------------------< 156     08-30 @ 02:17    4.2   |  25.0   |  1.84     eGFR non-  25     eGFR   29      130   |  90     |  18.0   ----------------------------< 176     08-27 @ 07:52    4.2   |  25.0   |  1.68     eGFR non-  28     eGFR   32         TPro --    / Alb 3.8   / TBili 0.5   / DBili --    / AST 15    / ALT <5    / AlkPhos --     08-27 @ 07:52        RADIOLOGY & ADDITIONAL TESTS:  Left perihilar cavitary mass versus abscess. Correlate for history of   neoplasm.  Mild mediastinal adenopathy    Suspected mild interstitial edema and small pleural effusions  1.  Increased size of left upper lobe mass, abutting the fissure and   extending into the left lower lobe,with adjacent FDG avid nodular   interlobular septal thickening, suspicious for lymphangitic spread of   tumor. New left upper lobe FDG avid nodules are suspicious for metastatic   disease.    2.  New FDG avid mediastinal lymphadenopathy.    3.  Increased intensity and extent of subpleural airspace and   interstitial infiltrate right middle lobe. Findings may represent   radiation therapy changes with superimposed infection or inflammation.   Clinical correlation and continued CT follow-up suggested.

## 2018-08-30 NOTE — DISCHARGE NOTE ADULT - CARE PROVIDER_API CALL
Samson lofton  Phone: (   )    -  Fax: (   )    -    Maria Del Carmen June), Internal Medicine  [address] [place] [address]  Phone: [phone]  Fax: [phone]

## 2018-08-30 NOTE — DISCHARGE NOTE ADULT - HOSPITAL COURSE
82 y/o female with h/o COPD, CHF, CAD, CKD, h/o left lung nodules with follow up with oncologist. h/o excision of right lung cancer and right breast cancer s/p right lumpectomy. doesnt wear any home o2. with recent hospitalization on  5/29 with a new onset of A Fib and was started on Eliquis initially that  was held on previous dc due to GIB to be resumed later. Patient presents to er with gallego / sob.     Acute on chronic hypoxic resp failure: likely sec to acute on chronic Diastolic CHF and component of COPD exacerbation.   Torsemide 20mg po daily        Pneumonia  . b/l lower lung possible infiltrates/ possible cap.  Pulm favor lung cancer progression. Possible post obstructive pneumonia. Procalcitonin if greater than 0.1. Will continue abx levaquin and transition to po for discharge.         >afib with rvr   hr better controlled now.  po cardizem .   will continue with aspirin  . Cardiology holding eliquis for history of GI  bleed .     Copd -   Cooper steroid . Pulm is following as outpatient

## 2018-08-30 NOTE — DISCHARGE NOTE ADULT - PATIENT PORTAL LINK FT
You can access the [facility] Patient Portal, offered by [facility], by registering with the following website: [email or web address]

## 2018-08-30 NOTE — DISCHARGE NOTE ADULT - MEDICATION SUMMARY - MEDICATIONS TO TAKE
I will START or STAY ON the medications listed below when I get home from the hospital:    predniSONE 10 mg oral tablet  -- 2 tab(s) by mouth once a day MDD:take 4 tab 1 day. 2 tabs 3 days . 1 tab 3 days   -- It is very important that you take or use this exactly as directed.  Do not skip doses or discontinue unless directed by your doctor.  Obtain medical advice before taking any non-prescription drugs as some may affect the action of this medication.  Take with food or milk.    -- Indication: For SOB (shortness of breath)    aspirin 81 mg oral tablet, chewable  -- 1 tab(s) by mouth once a day  -- Indication: For Cardiomyopathy     losartan 50 mg oral tablet  -- 1 tab(s) by mouth once a day  -- Indication: For Hypertension     dilTIAZem 120 mg oral tablet  -- 1 tab(s) by mouth 2 times a day  -- Indication: For Afib     fenofibrate 134 mg oral capsule  -- 1 cap(s) by mouth once a day  -- Indication: For HLD    Metoprolol Succinate ER 50 mg oral tablet, extended release  -- 1 tab(s) by mouth once a day   -- It is very important that you take or use this exactly as directed.  Do not skip doses or discontinue unless directed by your doctor.  May cause drowsiness.  Alcohol may intensify this effect.  Use care when operating dangerous machinery.  Some non-prescription drugs may aggravate your condition.  Read all labels carefully.  If a warning appears, check with your doctor before taking.  Swallow whole.  Do not crush.  Take with food or milk.  This drug may impair the ability to drive or operate machinery.  Use care until you become familiar with its effects.    -- Indication: For Hypertension     Ventolin HFA 90 mcg/inh inhalation aerosol  -- 2 puff(s) inhaled 4 times a day  -- Indication: For COPD    Spiriva 18 mcg inhalation capsule  -- 1 cap(s) inhaled once a day   -- Check with your doctor before becoming pregnant.  For inhalation only.  It is very important that you take or use this exactly as directed.  Do not skip doses or discontinue unless directed by your doctor.  Obtain medical advice before taking any non-prescription drugs as some may affect the action of this medication.    -- Indication: For COPD    torsemide 20 mg oral tablet  -- 1 tab(s) by mouth once a day  -- Indication: For Cardiomyopathy     latanoprost 0.005% ophthalmic solution  -- 1 drop(s) to each affected eye once a day (in the evening)  -- Indication: For Eye condition     pantoprazole 40 mg oral delayed release tablet  -- 1 tab(s) by mouth 2 times a day  -- Indication: For GI protection

## 2018-08-30 NOTE — PHYSICAL THERAPY INITIAL EVALUATION ADULT - PERTINENT HX OF CURRENT PROBLEM, REHAB EVAL
pt presents to [facility] due to COPD exacerbation, SOB, progressive left lung mass, PNA

## 2018-08-30 NOTE — PROGRESS NOTE ADULT - ASSESSMENT
Ms. Marie is a 82 yo female with a history of CAD s/p remote PCI, essential hypertension, chronic atrial fibrillation, hyperlipidemia, renal insufficiency, breast and lung cancer, AR, COPD, who presents with shortness of breath for the last several days with minimal activity.    1. Dyspnea is likely multifactorial, due to COPD, ?bronchiolitis, lung cancer, rapid atrial fibrillation, mild volume overload, and possible progression of coronary disease.   She remains hypoxic requiring O2, I believe out of proportion to CHF. Would continue empiric diuresis, although no evidence of significant hypervolemia on exam or imaging. Continue nebs, steroids, and antibiotic treatment per Pulmonary. Would consider outpatient ischemia evaluation however would first need to clarify oncologic prognosis.   2. Continue BB and CCB for now for rate control. She has CHADSVASC of 6 but is a poor candidate for anticoagulation, required blood transfusion while on Eliquis. Recommend GI evaluation for history of melena. Will change Diltiazem to twice daily dosing.  3. Continue Aspirin for CAD s/p remote PCI.    Thank you for allowing us to participate in the care of your patient.    [facility]   [phone]

## 2018-08-30 NOTE — DISCHARGE NOTE ADULT - CARE PLAN
Principal Discharge DX:	SOB (shortness of breath)  Goal:	ADLs  Assessment and plan of treatment:	Follow up with pmd in 3-5 days   Ambulate with assistive device , assistant  Cardiac diet  Secondary Diagnosis:	Paroxysmal atrial fibrillation  Secondary Diagnosis:	Mixed simple and mucopurulent chronic bronchitis  Secondary Diagnosis:	Chronic systolic congestive heart failure  Secondary Diagnosis:	Lung nodule  Secondary Diagnosis:	Malignant neoplasm of central portion of right female breast, unspecified estrogen receptor status  Secondary Diagnosis:	Malignant neoplasm of middle lobe of right lung

## 2018-08-30 NOTE — DISCHARGE NOTE ADULT - SECONDARY DIAGNOSIS.
Paroxysmal atrial fibrillation Mixed simple and mucopurulent chronic bronchitis Chronic systolic congestive heart failure Lung nodule Malignant neoplasm of central portion of right female breast, unspecified estrogen receptor status Malignant neoplasm of middle lobe of right lung

## 2018-09-18 ENCOUNTER — OUTPATIENT (OUTPATIENT)
Dept: OUTPATIENT SERVICES | Facility: HOSPITAL | Age: 83
LOS: 1 days | Discharge: HOME | End: 2018-09-18

## 2018-09-18 DIAGNOSIS — C34.90 MALIGNANT NEOPLASM OF UNSPECIFIED PART OF UNSPECIFIED BRONCHUS OR LUNG: ICD-10-CM

## 2018-09-18 DIAGNOSIS — Z90.710 ACQUIRED ABSENCE OF BOTH CERVIX AND UTERUS: Chronic | ICD-10-CM

## 2018-09-24 ENCOUNTER — RESULT REVIEW (OUTPATIENT)
Age: 83
End: 2018-09-24

## 2018-09-24 ENCOUNTER — APPOINTMENT (OUTPATIENT)
Dept: HEMATOLOGY ONCOLOGY | Facility: CLINIC | Age: 83
End: 2018-09-24
Payer: MEDICARE

## 2018-09-24 VITALS
SYSTOLIC BLOOD PRESSURE: 125 MMHG | TEMPERATURE: 97.7 F | WEIGHT: 154.03 LBS | HEART RATE: 137 BPM | OXYGEN SATURATION: 98 % | HEIGHT: 64 IN | DIASTOLIC BLOOD PRESSURE: 72 MMHG | BODY MASS INDEX: 26.3 KG/M2

## 2018-09-24 LAB
BASOPHILS # BLD AUTO: 0.1 K/UL — SIGNIFICANT CHANGE UP (ref 0–0.2)
BASOPHILS NFR BLD AUTO: 1.3 % — SIGNIFICANT CHANGE UP (ref 0–2)
EOSINOPHIL # BLD AUTO: 0.7 K/UL — HIGH (ref 0–0.5)
EOSINOPHIL NFR BLD AUTO: 7 % — HIGH (ref 0–6)
HCT VFR BLD CALC: 37.2 % — SIGNIFICANT CHANGE UP (ref 34.5–45)
HGB BLD-MCNC: 11.7 G/DL — SIGNIFICANT CHANGE UP (ref 11.5–15.5)
LYMPHOCYTES # BLD AUTO: 1.4 K/UL — SIGNIFICANT CHANGE UP (ref 1–3.3)
LYMPHOCYTES # BLD AUTO: 13.1 % — SIGNIFICANT CHANGE UP (ref 13–44)
MCHC RBC-ENTMCNC: 25.9 PG — LOW (ref 27–34)
MCHC RBC-ENTMCNC: 31.5 GM/DL — LOW (ref 32–36)
MCV RBC AUTO: 82.3 FL — SIGNIFICANT CHANGE UP (ref 80–100)
MONOCYTES # BLD AUTO: 1 K/UL — HIGH (ref 0–0.9)
MONOCYTES NFR BLD AUTO: 9.6 % — SIGNIFICANT CHANGE UP (ref 2–14)
NEUTROPHILS # BLD AUTO: 7.1 K/UL — SIGNIFICANT CHANGE UP (ref 1.8–7.4)
NEUTROPHILS NFR BLD AUTO: 69.1 % — SIGNIFICANT CHANGE UP (ref 43–77)
PLATELET # BLD AUTO: 553 K/UL — HIGH (ref 150–400)
RBC # BLD: 4.53 M/UL — SIGNIFICANT CHANGE UP (ref 3.8–5.2)
RBC # FLD: 15.6 % — HIGH (ref 10.3–14.5)
WBC # BLD: 10.3 K/UL — SIGNIFICANT CHANGE UP (ref 3.8–10.5)
WBC # FLD AUTO: 10.3 K/UL — SIGNIFICANT CHANGE UP (ref 3.8–10.5)

## 2018-09-24 PROCEDURE — 99214 OFFICE O/P EST MOD 30 MIN: CPT

## 2018-09-25 LAB
ALBUMIN SERPL ELPH-MCNC: 3.6 G/DL
ALP BLD-CCNC: 43 U/L
ALT SERPL-CCNC: 7 U/L
ANION GAP SERPL CALC-SCNC: 16 MMOL/L
APTT BLD: 31.3 SEC
AST SERPL-CCNC: 20 U/L
BILIRUB SERPL-MCNC: 0.3 MG/DL
BUN SERPL-MCNC: 24 MG/DL
CALCIUM SERPL-MCNC: 9.9 MG/DL
CHLORIDE SERPL-SCNC: 93 MMOL/L
CO2 SERPL-SCNC: 28 MMOL/L
CREAT SERPL-MCNC: 1.62 MG/DL
GLUCOSE SERPL-MCNC: 106 MG/DL
INR PPP: 1.09 RATIO
POTASSIUM SERPL-SCNC: 4.3 MMOL/L
PROT SERPL-MCNC: 6.5 G/DL
PT BLD: 12.3 SEC
SODIUM SERPL-SCNC: 137 MMOL/L

## 2018-11-21 ENCOUNTER — APPOINTMENT (OUTPATIENT)
Dept: GASTROENTEROLOGY | Facility: CLINIC | Age: 83
End: 2018-11-21
Payer: MEDICARE

## 2018-11-21 VITALS
BODY MASS INDEX: 26.29 KG/M2 | HEIGHT: 64 IN | DIASTOLIC BLOOD PRESSURE: 89 MMHG | HEART RATE: 85 BPM | OXYGEN SATURATION: 98 % | SYSTOLIC BLOOD PRESSURE: 123 MMHG | RESPIRATION RATE: 15 BRPM | WEIGHT: 154 LBS

## 2018-11-21 DIAGNOSIS — K21.9 GASTRO-ESOPHAGEAL REFLUX DISEASE W/OUT ESOPHAGITIS: ICD-10-CM

## 2018-11-21 DIAGNOSIS — D50.9 IRON DEFICIENCY ANEMIA, UNSPECIFIED: ICD-10-CM

## 2018-11-21 DIAGNOSIS — Z80.49 FAMILY HISTORY OF MALIGNANT NEOPLASM OF OTHER GENITAL ORGANS: ICD-10-CM

## 2018-11-21 PROCEDURE — 99204 OFFICE O/P NEW MOD 45 MIN: CPT

## 2018-11-21 PROCEDURE — 99214 OFFICE O/P EST MOD 30 MIN: CPT

## 2018-11-27 ENCOUNTER — APPOINTMENT (OUTPATIENT)
Dept: PULMONOLOGY | Facility: CLINIC | Age: 83
End: 2018-11-27
Payer: MEDICARE

## 2018-11-27 VITALS — SYSTOLIC BLOOD PRESSURE: 120 MMHG | DIASTOLIC BLOOD PRESSURE: 80 MMHG | OXYGEN SATURATION: 95 % | HEART RATE: 81 BPM

## 2018-11-27 VITALS — BODY MASS INDEX: 26.78 KG/M2 | WEIGHT: 156 LBS

## 2018-11-27 PROCEDURE — 94010 BREATHING CAPACITY TEST: CPT

## 2018-11-27 PROCEDURE — 99215 OFFICE O/P EST HI 40 MIN: CPT | Mod: 25

## 2018-12-05 ENCOUNTER — APPOINTMENT (OUTPATIENT)
Dept: THORACIC SURGERY | Facility: CLINIC | Age: 83
End: 2018-12-05
Payer: MEDICARE

## 2018-12-05 VITALS
BODY MASS INDEX: 26.46 KG/M2 | OXYGEN SATURATION: 97 % | HEIGHT: 64 IN | WEIGHT: 155 LBS | DIASTOLIC BLOOD PRESSURE: 60 MMHG | SYSTOLIC BLOOD PRESSURE: 110 MMHG

## 2018-12-05 PROCEDURE — 99213 OFFICE O/P EST LOW 20 MIN: CPT

## 2018-12-05 RX ORDER — METOPROLOL TARTRATE 50 MG/1
50 TABLET, FILM COATED ORAL
Refills: 0 | Status: ACTIVE | COMMUNITY

## 2018-12-05 RX ORDER — ASPIRIN 81 MG
81 TABLET, DELAYED RELEASE (ENTERIC COATED) ORAL
Refills: 0 | Status: ACTIVE | COMMUNITY

## 2018-12-05 RX ORDER — METOPROLOL SUCCINATE 100 MG/1
100 TABLET, EXTENDED RELEASE ORAL
Qty: 90 | Refills: 0 | Status: DISCONTINUED | COMMUNITY
Start: 2018-06-04 | End: 2018-12-05

## 2018-12-05 RX ORDER — TORSEMIDE 20 MG/1
20 TABLET ORAL DAILY
Refills: 0 | Status: ACTIVE | COMMUNITY
Start: 2018-12-05

## 2018-12-05 RX ORDER — TORSEMIDE 5 MG/1
5 TABLET ORAL
Refills: 0 | Status: DISCONTINUED | COMMUNITY
End: 2018-12-05

## 2018-12-13 ENCOUNTER — RX RENEWAL (OUTPATIENT)
Age: 83
End: 2018-12-13

## 2018-12-13 RX ORDER — PANTOPRAZOLE 40 MG/1
40 TABLET, DELAYED RELEASE ORAL DAILY
Qty: 90 | Refills: 1 | Status: ACTIVE | COMMUNITY
Start: 2018-12-13 | End: 1900-01-01

## 2018-12-28 ENCOUNTER — OUTPATIENT (OUTPATIENT)
Dept: OUTPATIENT SERVICES | Facility: HOSPITAL | Age: 83
LOS: 1 days | End: 2018-12-28
Payer: MEDICARE

## 2018-12-28 VITALS
WEIGHT: 154.32 LBS | HEART RATE: 71 BPM | DIASTOLIC BLOOD PRESSURE: 63 MMHG | HEIGHT: 63 IN | RESPIRATION RATE: 18 BRPM | TEMPERATURE: 97 F | SYSTOLIC BLOOD PRESSURE: 112 MMHG

## 2018-12-28 DIAGNOSIS — R91.8 OTHER NONSPECIFIC ABNORMAL FINDING OF LUNG FIELD: Chronic | ICD-10-CM

## 2018-12-28 DIAGNOSIS — Z90.710 ACQUIRED ABSENCE OF BOTH CERVIX AND UTERUS: Chronic | ICD-10-CM

## 2018-12-28 DIAGNOSIS — I50.9 HEART FAILURE, UNSPECIFIED: ICD-10-CM

## 2018-12-28 DIAGNOSIS — Z01.818 ENCOUNTER FOR OTHER PREPROCEDURAL EXAMINATION: ICD-10-CM

## 2018-12-28 DIAGNOSIS — R91.8 OTHER NONSPECIFIC ABNORMAL FINDING OF LUNG FIELD: ICD-10-CM

## 2018-12-28 DIAGNOSIS — Z90.11 ACQUIRED ABSENCE OF RIGHT BREAST AND NIPPLE: Chronic | ICD-10-CM

## 2018-12-28 DIAGNOSIS — R22.2 LOCALIZED SWELLING, MASS AND LUMP, TRUNK: ICD-10-CM

## 2018-12-28 LAB
ANION GAP SERPL CALC-SCNC: 18 MMOL/L — HIGH (ref 5–17)
BUN SERPL-MCNC: 30 MG/DL — HIGH (ref 8–20)
CALCIUM SERPL-MCNC: 9.8 MG/DL — SIGNIFICANT CHANGE UP (ref 8.6–10.2)
CHLORIDE SERPL-SCNC: 96 MMOL/L — LOW (ref 98–107)
CO2 SERPL-SCNC: 26 MMOL/L — SIGNIFICANT CHANGE UP (ref 22–29)
CREAT SERPL-MCNC: 2 MG/DL — HIGH (ref 0.5–1.3)
GLUCOSE SERPL-MCNC: 94 MG/DL — SIGNIFICANT CHANGE UP (ref 70–115)
HCT VFR BLD CALC: 41.4 % — SIGNIFICANT CHANGE UP (ref 37–47)
HGB BLD-MCNC: 13.6 G/DL — SIGNIFICANT CHANGE UP (ref 12–16)
MCHC RBC-ENTMCNC: 29.1 PG — SIGNIFICANT CHANGE UP (ref 27–31)
MCHC RBC-ENTMCNC: 32.9 G/DL — SIGNIFICANT CHANGE UP (ref 32–36)
MCV RBC AUTO: 88.5 FL — SIGNIFICANT CHANGE UP (ref 81–99)
PLATELET # BLD AUTO: 317 K/UL — SIGNIFICANT CHANGE UP (ref 150–400)
POTASSIUM SERPL-MCNC: 3.7 MMOL/L — SIGNIFICANT CHANGE UP (ref 3.5–5.3)
POTASSIUM SERPL-SCNC: 3.7 MMOL/L — SIGNIFICANT CHANGE UP (ref 3.5–5.3)
RBC # BLD: 4.68 M/UL — SIGNIFICANT CHANGE UP (ref 4.4–5.2)
RBC # FLD: 16.5 % — HIGH (ref 11–15.6)
SODIUM SERPL-SCNC: 140 MMOL/L — SIGNIFICANT CHANGE UP (ref 135–145)
WBC # BLD: 11.8 K/UL — HIGH (ref 4.8–10.8)
WBC # FLD AUTO: 11.8 K/UL — HIGH (ref 4.8–10.8)

## 2018-12-28 PROCEDURE — G0463: CPT

## 2018-12-28 PROCEDURE — 85027 COMPLETE CBC AUTOMATED: CPT

## 2018-12-28 PROCEDURE — 36415 COLL VENOUS BLD VENIPUNCTURE: CPT

## 2018-12-28 PROCEDURE — 80048 BASIC METABOLIC PNL TOTAL CA: CPT

## 2018-12-28 RX ORDER — LOSARTAN POTASSIUM 100 MG/1
1 TABLET, FILM COATED ORAL
Qty: 0 | Refills: 0 | COMMUNITY

## 2018-12-28 RX ORDER — FENOFIBRATE,MICRONIZED 130 MG
1 CAPSULE ORAL
Qty: 0 | Refills: 0 | COMMUNITY

## 2018-12-28 NOTE — H&P PST ADULT - PMH
Afib    Breast cancer    CAD (coronary artery disease)    CHF (congestive heart failure)    Chronic kidney disease    COPD (chronic obstructive pulmonary disease)    GERD (gastroesophageal reflux disease)    Glaucoma    Lung mass

## 2018-12-28 NOTE — H&P PST ADULT - ASSESSMENT
84 year old female presents with a lung mass scheduled for a flexible bronchoscopy and EBUS on 19.  CAPRINI SCORE [CLOT]    AGE RELATED RISK FACTORS                                                       MOBILITY RELATED FACTORS  [ ] Age 41-60 years                                            (1 Point)                  [ ] Bed rest                                                        (1 Point)  [ ] Age: 61-74 years                                           (2 Points)                 [ ] Plaster cast                                                   (2 Points)  [x ] Age= 75 years                                              (3 Points)                 [ ] Bed bound for more than 72 hours                 (2 Points)    DISEASE RELATED RISK FACTORS                                               GENDER SPECIFIC FACTORS  [ ] Edema in the lower extremities                       (1 Point)                  [ ] Pregnancy                                                     (1 Point)  [ ] Varicose veins                                               (1 Point)                  [ ] Post-partum < 6 weeks                                   (1 Point)             [x ] BMI > 25 Kg/m2                                            (1 Point)                  [ ] Hormonal therapy  or oral contraception          (1 Point)                 [ ] Sepsis (in the previous month)                        (1 Point)                  [ ] History of pregnancy complications                 (1 point)  [ ] Pneumonia or serious lung disease                                               [ ] Unexplained or recurrent                     (1 Point)           (in the previous month)                               (1 Point)  [ ] Abnormal pulmonary function test                     (1 Point)                 SURGERY RELATED RISK FACTORS  [ ] Acute myocardial infarction                              (1 Point)                 [ ]  Section                                             (1 Point)  [ ] Congestive heart failure (in the previous month)  (1 Point)               [x ] Minor surgery                                                  (1 Point)   [ ] Inflammatory bowel disease                             (1 Point)                 [ ] Arthroscopic surgery                                        (2 Points)  [ ] Central venous access                                      (2 Points)                [ ] General surgery lasting more than 45 minutes   (2 Points)       [ ] Stroke (in the previous month)                          (5 Points)               [ ] Elective arthroplasty                                         (5 Points)                                                                                                                                               HEMATOLOGY RELATED FACTORS                                                 TRAUMA RELATED RISK FACTORS  [ ] Prior episodes of VTE                                     (3 Points)                 [ ] Fracture of the hip, pelvis, or leg                       (5 Points)  [ ] Positive family history for VTE                         (3 Points)                 [ ] Acute spinal cord injury (in the previous month)  (5 Points)  [ ] Prothrombin 30537 A                                     (3 Points)                 [ ] Paralysis  (less than 1 month)                             (5 Points)  [ ] Factor V Leiden                                             (3 Points)                  [ ] Multiple Trauma within 1 month                        (5 Points)  [ ] Lupus anticoagulants                                     (3 Points)                                                           [ ] Anticardiolipin antibodies                               (3 Points)                                                       [ ] High homocysteine in the blood                      (3 Points)                                             [ ] Other congenital or acquired thrombophilia      (3 Points)                                                [ ] Heparin induced thrombocytopenia                  (3 Points)                                          Total Score [  5   ]  OPIOID RISK TOOL    CELESTE EACH BOX THAT APPLIES AND ADD TOTALS AT THE END    FAMILY HISTORY OF SUBSTANCE ABUSE                 FEMALE         MALE                                                Alcohol                             [  ]1 pt          [  ]3pts                                               Illegal Drugs                     [  ]2 pts        [  ]3pts                                               Rx Drugs                           [  ]4 pts        [  ]4 pts    PERSONAL HISTORY OF SUBSTANCE ABUSE                                                                                          Alcohol                             [  ]3 pts       [  ]3 pts                                               Illegal Drugs                     [  ]4 pts        [  ]4 pts                                               Rx Drugs                           [  ]5 pts        [  ]5 pts    AGE BETWEEN 16-45 YEARS                                      [  ]1 pt         [  ]1 pt    HISTORY OF PREADOLESCENT   SEXUAL ABUSE                                                             [  ]3 pts        [  ]0pts    PSYCHOLOGICAL DISEASE                     ADD, OCD, Bipolar, Schizophrenia        [  ]2 pts         [  ]2 pts                      Depression                                               [  ]1 pt           [  ]1 pt           SCORING TOTAL   (add numbers and type here)              (0)                                     A score of 3 or lower indicated LOW risk for future opioid abuse  A score of 4 to 7 indicated moderate risk for future opioid abuse  A score of 8 or higher indicates a high risk for opioid abuse

## 2018-12-28 NOTE — H&P PST ADULT - HISTORY OF PRESENT ILLNESS
84 year old female with a history of COPD, Afib, CAD 84 year old female with a history of Breast Ca, Ovarian Ca, COPD, Afib, CAD, HF and GERD presents with a lung mass scheduled for a bronchoscopy and EBUS on 1/11/19.

## 2019-01-10 ENCOUNTER — TRANSCRIPTION ENCOUNTER (OUTPATIENT)
Age: 84
End: 2019-01-10

## 2019-01-11 ENCOUNTER — OUTPATIENT (OUTPATIENT)
Dept: INPATIENT UNIT | Facility: HOSPITAL | Age: 84
LOS: 1 days | End: 2019-01-11
Payer: MEDICARE

## 2019-01-11 ENCOUNTER — RESULT REVIEW (OUTPATIENT)
Age: 84
End: 2019-01-11

## 2019-01-11 ENCOUNTER — APPOINTMENT (OUTPATIENT)
Dept: THORACIC SURGERY | Facility: HOSPITAL | Age: 84
End: 2019-01-11

## 2019-01-11 VITALS
HEART RATE: 81 BPM | OXYGEN SATURATION: 98 % | DIASTOLIC BLOOD PRESSURE: 62 MMHG | SYSTOLIC BLOOD PRESSURE: 115 MMHG | RESPIRATION RATE: 19 BRPM

## 2019-01-11 VITALS
DIASTOLIC BLOOD PRESSURE: 81 MMHG | WEIGHT: 147.05 LBS | TEMPERATURE: 97 F | SYSTOLIC BLOOD PRESSURE: 132 MMHG | HEART RATE: 83 BPM | HEIGHT: 64 IN | RESPIRATION RATE: 16 BRPM | OXYGEN SATURATION: 96 %

## 2019-01-11 DIAGNOSIS — R91.8 OTHER NONSPECIFIC ABNORMAL FINDING OF LUNG FIELD: Chronic | ICD-10-CM

## 2019-01-11 DIAGNOSIS — R22.2 LOCALIZED SWELLING, MASS AND LUMP, TRUNK: ICD-10-CM

## 2019-01-11 DIAGNOSIS — Z90.710 ACQUIRED ABSENCE OF BOTH CERVIX AND UTERUS: Chronic | ICD-10-CM

## 2019-01-11 DIAGNOSIS — Z90.11 ACQUIRED ABSENCE OF RIGHT BREAST AND NIPPLE: Chronic | ICD-10-CM

## 2019-01-11 PROCEDURE — 71045 X-RAY EXAM CHEST 1 VIEW: CPT

## 2019-01-11 PROCEDURE — 88112 CYTOPATH CELL ENHANCE TECH: CPT

## 2019-01-11 PROCEDURE — 88112 CYTOPATH CELL ENHANCE TECH: CPT | Mod: 26,59

## 2019-01-11 PROCEDURE — 31624 DX BRONCHOSCOPE/LAVAGE: CPT

## 2019-01-11 PROCEDURE — 88305 TISSUE EXAM BY PATHOLOGIST: CPT

## 2019-01-11 PROCEDURE — 31653 BRONCH EBUS SAMPLNG 3/> NODE: CPT

## 2019-01-11 PROCEDURE — 31652 BRONCH EBUS SAMPLNG 1/2 NODE: CPT

## 2019-01-11 PROCEDURE — 88173 CYTOPATH EVAL FNA REPORT: CPT

## 2019-01-11 PROCEDURE — 88305 TISSUE EXAM BY PATHOLOGIST: CPT | Mod: 26

## 2019-01-11 PROCEDURE — 88173 CYTOPATH EVAL FNA REPORT: CPT | Mod: 26

## 2019-01-11 PROCEDURE — 71045 X-RAY EXAM CHEST 1 VIEW: CPT | Mod: 26

## 2019-01-11 RX ORDER — ASPIRIN/CALCIUM CARB/MAGNESIUM 324 MG
81 TABLET ORAL DAILY
Qty: 0 | Refills: 0 | Status: DISCONTINUED | OUTPATIENT
Start: 2019-01-11 | End: 2019-01-26

## 2019-01-11 RX ORDER — SODIUM CHLORIDE 9 MG/ML
1000 INJECTION, SOLUTION INTRAVENOUS
Qty: 0 | Refills: 0 | Status: DISCONTINUED | OUTPATIENT
Start: 2019-01-11 | End: 2019-01-11

## 2019-01-11 RX ORDER — FENTANYL CITRATE 50 UG/ML
25 INJECTION INTRAVENOUS
Qty: 0 | Refills: 0 | Status: DISCONTINUED | OUTPATIENT
Start: 2019-01-11 | End: 2019-01-11

## 2019-01-11 RX ORDER — ALBUTEROL 90 UG/1
1 AEROSOL, METERED ORAL EVERY 4 HOURS
Qty: 0 | Refills: 0 | Status: DISCONTINUED | OUTPATIENT
Start: 2019-01-11 | End: 2019-01-26

## 2019-01-11 RX ORDER — SODIUM CHLORIDE 9 MG/ML
3 INJECTION INTRAMUSCULAR; INTRAVENOUS; SUBCUTANEOUS ONCE
Qty: 0 | Refills: 0 | Status: DISCONTINUED | OUTPATIENT
Start: 2019-01-11 | End: 2019-01-11

## 2019-01-11 RX ORDER — ALBUTEROL 90 UG/1
2 AEROSOL, METERED ORAL EVERY 6 HOURS
Qty: 0 | Refills: 0 | Status: DISCONTINUED | OUTPATIENT
Start: 2019-01-11 | End: 2019-01-26

## 2019-01-11 RX ORDER — FENOFIBRATE,MICRONIZED 130 MG
160 CAPSULE ORAL DAILY
Qty: 0 | Refills: 0 | Status: DISCONTINUED | OUTPATIENT
Start: 2019-01-11 | End: 2019-01-13

## 2019-01-11 RX ORDER — DILTIAZEM HCL 120 MG
180 CAPSULE, EXT RELEASE 24 HR ORAL DAILY
Qty: 0 | Refills: 0 | Status: DISCONTINUED | OUTPATIENT
Start: 2019-01-11 | End: 2019-01-26

## 2019-01-11 RX ORDER — METOPROLOL TARTRATE 50 MG
50 TABLET ORAL DAILY
Qty: 0 | Refills: 0 | Status: DISCONTINUED | OUTPATIENT
Start: 2019-01-11 | End: 2019-01-26

## 2019-01-11 RX ORDER — IBUPROFEN 200 MG
400 TABLET ORAL EVERY 8 HOURS
Qty: 0 | Refills: 0 | Status: DISCONTINUED | OUTPATIENT
Start: 2019-01-11 | End: 2019-01-26

## 2019-01-11 RX ORDER — ONDANSETRON 8 MG/1
4 TABLET, FILM COATED ORAL ONCE
Qty: 0 | Refills: 0 | Status: DISCONTINUED | OUTPATIENT
Start: 2019-01-11 | End: 2019-01-11

## 2019-01-11 RX ORDER — IPRATROPIUM/ALBUTEROL SULFATE 18-103MCG
3 AEROSOL WITH ADAPTER (GRAM) INHALATION EVERY 6 HOURS
Qty: 0 | Refills: 0 | Status: DISCONTINUED | OUTPATIENT
Start: 2019-01-11 | End: 2019-01-26

## 2019-01-11 NOTE — BRIEF OPERATIVE NOTE - PROCEDURE
<<-----Click on this checkbox to enter Procedure Flexible bronchoscopy by cardiothoracic surgery  01/11/2019  EBUS and BAL  Active  POLLY

## 2019-01-11 NOTE — ASU DISCHARGE PLAN (ADULT/PEDIATRIC). - MEDICATION SUMMARY - MEDICATIONS TO TAKE
I will START or STAY ON the medications listed below when I get home from the hospital:    aspirin 81 mg oral tablet, chewable  -- 1 tab(s) by mouth once a day  -- Indication: For CAD     dilTIAZem 180 mg/24 hours oral capsule, extended release  -- 1 cap(s) by mouth 2 times a day  -- Indication: For HTN     fenofibrate 160 mg oral tablet  -- 1 tab(s) by mouth once a day  -- Indication: For Supplement     Metoprolol Succinate ER 50 mg oral tablet, extended release  -- 1 tab(s) by mouth 2 times a day  -- It is very important that you take or use this exactly as directed.  Do not skip doses or discontinue unless directed by your doctor.  May cause drowsiness.  Alcohol may intensify this effect.  Use care when operating dangerous machinery.  Some non-prescription drugs may aggravate your condition.  Read all labels carefully.  If a warning appears, check with your doctor before taking.  Swallow whole.  Do not crush.  Take with food or milk.  This drug may impair the ability to drive or operate machinery.  Use care until you become familiar with its effects.    -- Indication: For HTN     Ventolin HFA 90 mcg/inh inhalation aerosol  -- 2 puff(s) inhaled 4 times a day  -- Indication: For COPD     torsemide 20 mg oral tablet  -- 1 tab(s) by mouth once a day  -- Indication: For HTN     latanoprost 0.005% ophthalmic solution  -- 1 drop(s) to each affected eye once a day (in the evening)  -- Indication: For Eye care     pantoprazole 40 mg oral delayed release tablet  -- 1 tab(s) by mouth once a day (at bedtime)  -- Indication: For GERD

## 2019-01-11 NOTE — BRIEF OPERATIVE NOTE - OPERATION/FINDINGS
Post radiation inflammatory changes noted Post radiation inflammatory changes noted with no discrete mass present

## 2019-01-11 NOTE — ASU DISCHARGE PLAN (ADULT/PEDIATRIC). - NOTIFY
Bleeding that does not stop/Persistent Nausea and Vomiting/Pain not relieved by Medications/Swelling that continues

## 2019-01-14 PROBLEM — H40.9 UNSPECIFIED GLAUCOMA: Chronic | Status: ACTIVE | Noted: 2018-12-28

## 2019-01-14 PROBLEM — R91.8 OTHER NONSPECIFIC ABNORMAL FINDING OF LUNG FIELD: Chronic | Status: ACTIVE | Noted: 2018-12-28

## 2019-01-14 PROBLEM — N18.9 CHRONIC KIDNEY DISEASE, UNSPECIFIED: Chronic | Status: ACTIVE | Noted: 2018-12-28

## 2019-01-14 PROBLEM — C50.919 MALIGNANT NEOPLASM OF UNSPECIFIED SITE OF UNSPECIFIED FEMALE BREAST: Chronic | Status: ACTIVE | Noted: 2018-12-28

## 2019-01-14 PROBLEM — K21.9 GASTRO-ESOPHAGEAL REFLUX DISEASE WITHOUT ESOPHAGITIS: Chronic | Status: ACTIVE | Noted: 2018-12-28

## 2019-01-14 LAB
NON-GYNECOLOGICAL CYTOLOGY STUDY: SIGNIFICANT CHANGE UP

## 2019-01-22 ENCOUNTER — APPOINTMENT (OUTPATIENT)
Age: 84
End: 2019-01-22
Payer: MEDICARE

## 2019-01-22 VITALS
HEIGHT: 64 IN | WEIGHT: 149.01 LBS | HEART RATE: 75 BPM | SYSTOLIC BLOOD PRESSURE: 127 MMHG | DIASTOLIC BLOOD PRESSURE: 74 MMHG | BODY MASS INDEX: 25.44 KG/M2 | OXYGEN SATURATION: 97 %

## 2019-01-22 PROCEDURE — 99214 OFFICE O/P EST MOD 30 MIN: CPT

## 2019-01-22 NOTE — HISTORY OF PRESENT ILLNESS
[FreeTextEntry1] : \kelsy Mejia was in the office today for her first postoperative visit. She recently underwent bronchoscopy with endobronchial ultrasound that were nondiagnostic showing no evidence of malignancy. She otherwise has no new complaints.

## 2019-01-22 NOTE — ASSESSMENT
[FreeTextEntry1] : Jackie is an 84-year-old female with what appears to be a malignancy and along recently underwent bronchoscopy and endobronchial ultrasound that did not make a diagnosis. Based on the high level of suspicion have recommended she be reevaluated for interventional radiology biopsy. This will be arranged through my office.\par \[facility] Thank you for allowing me to participate in the care of your patient.\par \par Hayes Jones MD\[facility] Department of Cardiovascular and Thoracic Surgery\par \par Steven and Lina Griffiths\[facility] [employer] [facility]\par

## 2019-01-22 NOTE — PHYSICAL EXAM
[Neck Appearance] : the appearance of the neck was normal [Neck Cervical Mass (___cm)] : no neck mass was observed [Jugular Venous Distention Increased] : there was no jugular-venous distention [Thyroid Diffuse Enlargement] : the thyroid was not enlarged [Thyroid Nodule] : there were no palpable thyroid nodules [Auscultation Breath Sounds / Voice Sounds] : lungs were clear to auscultation bilaterally [Heart Rate And Rhythm] : heart rate was normal and rhythm regular [Heart Sounds] : normal S1 and S2 [Heart Sounds Gallop] : no gallops [Murmurs] : no murmurs [Heart Sounds Pericardial Friction Rub] : no pericardial rub [Examination Of The Chest] : the chest was normal in appearance [Chest Visual Inspection Thoracic Asymmetry] : no chest asymmetry [Diminished Respiratory Excursion] : normal chest expansion [Bowel Sounds] : normal bowel sounds [Abdomen Soft] : soft [Abdomen Tenderness] : non-tender [Abdomen Mass (___ Cm)] : no abdominal mass palpated [Cervical Lymph Nodes Enlarged Posterior Bilaterally] : posterior cervical [Cervical Lymph Nodes Enlarged Anterior Bilaterally] : anterior cervical [No CVA Tenderness] : no ~M costovertebral angle tenderness [No Spinal Tenderness] : no spinal tenderness [Nail Clubbing] : no clubbing  or cyanosis of the fingernails [Motor Tone] : muscle strength and tone were normal [] : no rash [Skin Lesions] : no skin lesions [No Focal Deficits] : no focal deficits [Oriented To Time, Place, And Person] : oriented to person, place, and time [Impaired Insight] : insight and judgment were intact [Affect] : the affect was normal

## 2019-02-05 ENCOUNTER — OUTPATIENT (OUTPATIENT)
Dept: INPATIENT UNIT | Facility: HOSPITAL | Age: 84
LOS: 1 days | End: 2019-02-05
Payer: MEDICARE

## 2019-02-05 VITALS
TEMPERATURE: 97 F | DIASTOLIC BLOOD PRESSURE: 45 MMHG | HEART RATE: 74 BPM | RESPIRATION RATE: 16 BRPM | WEIGHT: 154.32 LBS | SYSTOLIC BLOOD PRESSURE: 132 MMHG | OXYGEN SATURATION: 98 % | HEIGHT: 63 IN

## 2019-02-05 DIAGNOSIS — R91.8 OTHER NONSPECIFIC ABNORMAL FINDING OF LUNG FIELD: Chronic | ICD-10-CM

## 2019-02-05 DIAGNOSIS — Z90.11 ACQUIRED ABSENCE OF RIGHT BREAST AND NIPPLE: Chronic | ICD-10-CM

## 2019-02-05 DIAGNOSIS — Z90.710 ACQUIRED ABSENCE OF BOTH CERVIX AND UTERUS: Chronic | ICD-10-CM

## 2019-02-05 DIAGNOSIS — R07.89 OTHER CHEST PAIN: ICD-10-CM

## 2019-02-05 LAB
ANION GAP SERPL CALC-SCNC: 13 MMOL/L — SIGNIFICANT CHANGE UP (ref 5–17)
APTT BLD: 32.9 SEC — SIGNIFICANT CHANGE UP (ref 27.5–36.3)
BASOPHILS # BLD AUTO: 0.1 K/UL — SIGNIFICANT CHANGE UP (ref 0–0.2)
BASOPHILS NFR BLD AUTO: 0.8 % — SIGNIFICANT CHANGE UP (ref 0–2)
BUN SERPL-MCNC: 27 MG/DL — HIGH (ref 8–20)
CALCIUM SERPL-MCNC: 10.2 MG/DL — SIGNIFICANT CHANGE UP (ref 8.6–10.2)
CHLORIDE SERPL-SCNC: 97 MMOL/L — LOW (ref 98–107)
CO2 SERPL-SCNC: 29 MMOL/L — SIGNIFICANT CHANGE UP (ref 22–29)
CREAT SERPL-MCNC: 1.91 MG/DL — HIGH (ref 0.5–1.3)
EOSINOPHIL # BLD AUTO: 0.3 K/UL — SIGNIFICANT CHANGE UP (ref 0–0.5)
EOSINOPHIL NFR BLD AUTO: 2.8 % — SIGNIFICANT CHANGE UP (ref 0–6)
GLUCOSE SERPL-MCNC: 86 MG/DL — SIGNIFICANT CHANGE UP (ref 70–115)
HCT VFR BLD CALC: 39.9 % — SIGNIFICANT CHANGE UP (ref 37–47)
HGB BLD-MCNC: 13.1 G/DL — SIGNIFICANT CHANGE UP (ref 12–16)
INR BLD: 1.05 RATIO — SIGNIFICANT CHANGE UP (ref 0.88–1.16)
LYMPHOCYTES # BLD AUTO: 1.8 K/UL — SIGNIFICANT CHANGE UP (ref 1–4.8)
LYMPHOCYTES # BLD AUTO: 16.7 % — LOW (ref 20–55)
MCHC RBC-ENTMCNC: 28.2 PG — SIGNIFICANT CHANGE UP (ref 27–31)
MCHC RBC-ENTMCNC: 32.8 G/DL — SIGNIFICANT CHANGE UP (ref 32–36)
MCV RBC AUTO: 85.8 FL — SIGNIFICANT CHANGE UP (ref 81–99)
MONOCYTES # BLD AUTO: 1 K/UL — HIGH (ref 0–0.8)
MONOCYTES NFR BLD AUTO: 9.1 % — SIGNIFICANT CHANGE UP (ref 3–10)
NEUTROPHILS # BLD AUTO: 7.5 K/UL — SIGNIFICANT CHANGE UP (ref 1.8–8)
NEUTROPHILS NFR BLD AUTO: 70.3 % — SIGNIFICANT CHANGE UP (ref 37–73)
PLATELET # BLD AUTO: 328 K/UL — SIGNIFICANT CHANGE UP (ref 150–400)
POTASSIUM SERPL-MCNC: 4.1 MMOL/L — SIGNIFICANT CHANGE UP (ref 3.5–5.3)
POTASSIUM SERPL-SCNC: 4.1 MMOL/L — SIGNIFICANT CHANGE UP (ref 3.5–5.3)
PROTHROM AB SERPL-ACNC: 12.1 SEC — SIGNIFICANT CHANGE UP (ref 10–12.9)
RBC # BLD: 4.65 M/UL — SIGNIFICANT CHANGE UP (ref 4.4–5.2)
RBC # FLD: 16.3 % — HIGH (ref 11–15.6)
SODIUM SERPL-SCNC: 139 MMOL/L — SIGNIFICANT CHANGE UP (ref 135–145)
WBC # BLD: 10.6 K/UL — SIGNIFICANT CHANGE UP (ref 4.8–10.8)
WBC # FLD AUTO: 10.6 K/UL — SIGNIFICANT CHANGE UP (ref 4.8–10.8)

## 2019-02-05 PROCEDURE — 85027 COMPLETE CBC AUTOMATED: CPT

## 2019-02-05 PROCEDURE — 36415 COLL VENOUS BLD VENIPUNCTURE: CPT

## 2019-02-05 PROCEDURE — 71250 CT THORAX DX C-: CPT | Mod: 26

## 2019-02-05 PROCEDURE — 85730 THROMBOPLASTIN TIME PARTIAL: CPT

## 2019-02-05 PROCEDURE — 80048 BASIC METABOLIC PNL TOTAL CA: CPT

## 2019-02-05 PROCEDURE — 77012 CT SCAN FOR NEEDLE BIOPSY: CPT

## 2019-02-05 PROCEDURE — 85610 PROTHROMBIN TIME: CPT

## 2019-02-05 RX ORDER — DILTIAZEM HCL 120 MG
1 CAPSULE, EXT RELEASE 24 HR ORAL
Qty: 0 | Refills: 0 | COMMUNITY

## 2019-02-05 RX ORDER — ALBUTEROL 90 UG/1
2 AEROSOL, METERED ORAL
Qty: 0 | Refills: 0 | COMMUNITY

## 2019-02-05 RX ORDER — LATANOPROST 0.05 MG/ML
1 SOLUTION/ DROPS OPHTHALMIC; TOPICAL
Qty: 0 | Refills: 0 | COMMUNITY

## 2019-02-05 RX ORDER — FENOFIBRATE,MICRONIZED 130 MG
1 CAPSULE ORAL
Qty: 0 | Refills: 0 | COMMUNITY

## 2019-02-05 NOTE — ASU DISCHARGE PLAN (ADULT/PEDIATRIC). - SPECIAL INSTRUCTIONS
procedure not done.  Pt is following up with Pulmonary  Dr. Bautista in 2 weeks.  She has an appointment.

## 2019-02-05 NOTE — ASU DISCHARGE PLAN (ADULT/PEDIATRIC). - MEDICATION SUMMARY - MEDICATIONS TO TAKE
I will START or STAY ON the medications listed below when I get home from the hospital:    aspirin 81 mg oral tablet, chewable  -- 1 tab(s) by mouth once a day  -- Indication: For pmd    dilTIAZem 180 mg/24 hours oral capsule, extended release  -- 1 cap(s) by mouth 2 times a day  -- Indication: For pmd    fenofibrate 160 mg oral tablet  -- 1 tab(s) by mouth once a day  -- Indication: For pmd    Metoprolol Succinate ER 50 mg oral tablet, extended release  -- 1 tab(s) by mouth 2 times a day  -- It is very important that you take or use this exactly as directed.  Do not skip doses or discontinue unless directed by your doctor.  May cause drowsiness.  Alcohol may intensify this effect.  Use care when operating dangerous machinery.  Some non-prescription drugs may aggravate your condition.  Read all labels carefully.  If a warning appears, check with your doctor before taking.  Swallow whole.  Do not crush.  Take with food or milk.  This drug may impair the ability to drive or operate machinery.  Use care until you become familiar with its effects.    -- Indication: For pmd    Ventolin HFA 90 mcg/inh inhalation aerosol  -- 2 puff(s) inhaled 4 times a day  -- Indication: For pmd    torsemide 20 mg oral tablet  -- 1 tab(s) by mouth once a day  -- Indication: For pmd    latanoprost 0.005% ophthalmic solution  -- 1 drop(s) to each affected eye once a day (in the evening)  -- Indication: For pmd    pantoprazole 40 mg oral delayed release tablet  -- 1 tab(s) by mouth once a day (at bedtime)  -- Indication: For pmd    Calcium 600+D oral tablet  -- 1 tab(s) by mouth once a day  -- Indication: For Other chest pain

## 2019-02-06 ENCOUNTER — APPOINTMENT (OUTPATIENT)
Dept: HEMATOLOGY ONCOLOGY | Facility: CLINIC | Age: 84
End: 2019-02-06

## 2019-02-19 ENCOUNTER — APPOINTMENT (OUTPATIENT)
Dept: PULMONOLOGY | Facility: CLINIC | Age: 84
End: 2019-02-19
Payer: MEDICARE

## 2019-02-19 VITALS — HEART RATE: 76 BPM | SYSTOLIC BLOOD PRESSURE: 122 MMHG | DIASTOLIC BLOOD PRESSURE: 70 MMHG | OXYGEN SATURATION: 96 %

## 2019-02-19 DIAGNOSIS — C34.90 MALIGNANT NEOPLASM OF UNSPECIFIED PART OF UNSPECIFIED BRONCHUS OR LUNG: ICD-10-CM

## 2019-02-19 PROCEDURE — 94010 BREATHING CAPACITY TEST: CPT

## 2019-02-19 PROCEDURE — 99215 OFFICE O/P EST HI 40 MIN: CPT | Mod: 25

## 2019-02-19 RX ORDER — PANTOPRAZOLE 40 MG/1
40 TABLET, DELAYED RELEASE ORAL
Qty: 30 | Refills: 5 | Status: DISCONTINUED | COMMUNITY
Start: 2018-11-21 | End: 2019-02-19

## 2019-02-19 NOTE — DISCUSSION/SUMMARY
[FreeTextEntry1] : 84-year-old female with a history of the above, seen today in followup. Her surprising findings at the time of her planned biopsy argue against recurrent lung cancer and support a resolving cavitary pneumonia, which she had been treated for in August of last year. Her underlying COPD appears to be at baseline on minimal therapy.

## 2019-02-19 NOTE — END OF VISIT
----- Message from Mikaela Sánchez sent at 1/25/2019  1:08 PM CST -----  Contact: pts daughter Patricia   Patient Requesting Sooner Appointment.     Reason for sooner appt.: pt is a new pt and is being referred by Dr Blanchard for pancreatic mass   When is the first available appointment? N/A   Communication Preference:can you please call pts maicol Gomez at [phone]  Additional Information: none    ASHLEY    [>50% of Time Spent on Counseling for ____] : Greater than 50% of the encounter time was spent on counseling for [unfilled] [Time Spent: ___ minutes] : I have spent [unfilled] minutes of face to face time with the patient

## 2019-02-19 NOTE — PROCEDURE
[FreeTextEntry1] : EXAM: SP CT GUID NDL PLC SI \par  EXAM: SP TMNT ROOM 1.0 HR \par \par PROCEDURE DATE: 02/05/2019 \par \par \par \par INTERPRETATION: Noncontrast CT scan chest: \par \par Clinical history: \par \par Left perihilar mass. Inadequate biopsy on bronchoscopy. CT-guided biopsy \par plan. \par \par Technique: \par \par An informed consent obtained. Risks and benefits explained. Images reviewed. \par Patient placed supine on oblique on CT scan. Multiple noncontrast CT images \par were obtained localized to the left hilum. This demonstrated significant \par reduction in the mass. Therefore, a noncontrast CT chest was performed from \par the proximal to the lung base. Images reviewed. Biopsy canceled. Patient \par sent to the [facility] for further observation and management. \par \par Findings: \par \par Global cardiomegaly. Atherosclerotic aorta. Bilateral small pleural fluid. \par Significant reduction in the left hilar mass thick-walled cavitating lesion \par compared to the prior CT of 8/27/2018. Unchanged left upper lung \par pleural-based scarring. \par \par Impression: \par \par Small bilateral pleural fluid. \par \par Significant reduction in left hilar mass lesion. \par Biopsy is not performed. \par \par MARK NORIEGA M.D., ATTENDING RADIOLOGIST \par This document has been electronically signed. Feb 5 2019 11:15AM \par Chest CAT scan reviewed on PACS with the patient.\par \par Spirometry demonstrates a moderate severe degree of restriction without change from prior studies of 11/27/18

## 2019-02-19 NOTE — HISTORY OF PRESENT ILLNESS
[FreeTextEntry1] : 84-year-old female with a history of stage II to 3. Chronic obstructive lung disease seen today in followup. Patient had an enlarging mass in the left hilum and underwent fiberoptic bronchoscopy with nevus, which was negative. She subsequently was scheduled for interventional radiology, which as described below, found that the lesion had significantly improved. She presently She has no complaints of cough, wheeze, or shortness of breath.

## 2019-02-19 NOTE — PHYSICAL EXAM
[General Appearance - Well Developed] : well developed [Normal Appearance] : normal appearance [Well Groomed] : well groomed [General Appearance - Well Nourished] : well nourished [No Deformities] : no deformities [General Appearance - In No Acute Distress] : no acute distress [Normal Conjunctiva] : the conjunctiva exhibited no abnormalities [Eyelids - No Xanthelasma] : the eyelids demonstrated no xanthelasmas [Normal Oropharynx] : normal oropharynx [Neck Appearance] : the appearance of the neck was normal [Neck Cervical Mass (___cm)] : no neck mass was observed [Jugular Venous Distention Increased] : there was no jugular-venous distention [Thyroid Diffuse Enlargement] : the thyroid was not enlarged [Thyroid Nodule] : there were no palpable thyroid nodules [Neck Circumference: ___] : neck circumference is [unfilled] [Heart Rate And Rhythm] : heart rate and rhythm were normal [Heart Sounds] : normal S1 and S2 [Murmurs] : no murmurs present [Respiration, Rhythm And Depth] : normal respiratory rhythm and effort [Exaggerated Use Of Accessory Muscles For Inspiration] : no accessory muscle use [Auscultation Breath Sounds / Voice Sounds] : lungs were clear to auscultation bilaterally [Surgical scars] : surgical scars [Abdomen Soft] : soft [Abdomen Tenderness] : non-tender [Abdomen Mass (___ Cm)] : no abdominal mass palpated [Abnormal Walk] : normal gait [Gait - Sufficient For Exercise Testing] : the gait was sufficient for exercise testing [Nail Clubbing] : no clubbing of the fingernails [Cyanosis, Localized] : no localized cyanosis [Petechial Hemorrhages (___cm)] : no petechial hemorrhages [Skin Color & Pigmentation] : normal skin color and pigmentation [] : no rash [No Venous Stasis] : no venous stasis [Skin Lesions] : no skin lesions [No Skin Ulcers] : no skin ulcer [No Xanthoma] : no  xanthoma was observed [Deep Tendon Reflexes (DTR)] : deep tendon reflexes were 2+ and symmetric [Sensation] : the sensory exam was normal to light touch and pinprick [No Focal Deficits] : no focal deficits [FreeTextEntry1] : obese

## 2019-02-19 NOTE — CONSULT LETTER
[Dear  ___] : Dear  [unfilled], [Consult Letter:] : I had the pleasure of evaluating your patient, [unfilled]. [Please see my note below.] : Please see my note below. [Sincerely,] : Sincerely, [DrHortencia  ___] : Dr. VELEZ [DrHortencia ___] : Dr. VELEZ [FreeTextEntry3] : Cabrera Pardo MD FCCP\par Pulmonary/Critical Care/Sleep Medicine\par Department of Internal Medicine\par \par [facility] [employer]\par

## 2019-03-01 ENCOUNTER — OTHER (OUTPATIENT)
Age: 84
End: 2019-03-01

## 2019-03-15 ENCOUNTER — APPOINTMENT (OUTPATIENT)
Dept: RADIOLOGY | Facility: CLINIC | Age: 84
End: 2019-03-15
Payer: MEDICARE

## 2019-03-15 ENCOUNTER — OUTPATIENT (OUTPATIENT)
Dept: OUTPATIENT SERVICES | Facility: HOSPITAL | Age: 84
LOS: 1 days | End: 2019-03-15
Payer: MEDICARE

## 2019-03-15 DIAGNOSIS — Z00.8 ENCOUNTER FOR OTHER GENERAL EXAMINATION: ICD-10-CM

## 2019-03-15 DIAGNOSIS — R91.8 OTHER NONSPECIFIC ABNORMAL FINDING OF LUNG FIELD: Chronic | ICD-10-CM

## 2019-03-15 DIAGNOSIS — Z90.710 ACQUIRED ABSENCE OF BOTH CERVIX AND UTERUS: Chronic | ICD-10-CM

## 2019-03-15 DIAGNOSIS — Z90.11 ACQUIRED ABSENCE OF RIGHT BREAST AND NIPPLE: Chronic | ICD-10-CM

## 2019-03-15 PROCEDURE — 71046 X-RAY EXAM CHEST 2 VIEWS: CPT | Mod: 26

## 2019-03-15 PROCEDURE — 71046 X-RAY EXAM CHEST 2 VIEWS: CPT

## 2019-04-18 ENCOUNTER — FORM ENCOUNTER (OUTPATIENT)
Age: 84
End: 2019-04-18

## 2019-04-19 ENCOUNTER — OUTPATIENT (OUTPATIENT)
Dept: OUTPATIENT SERVICES | Facility: HOSPITAL | Age: 84
LOS: 1 days | End: 2019-04-19
Payer: MEDICARE

## 2019-04-19 ENCOUNTER — APPOINTMENT (OUTPATIENT)
Dept: CT IMAGING | Facility: CLINIC | Age: 84
End: 2019-04-19
Payer: MEDICARE

## 2019-04-19 DIAGNOSIS — Z90.11 ACQUIRED ABSENCE OF RIGHT BREAST AND NIPPLE: Chronic | ICD-10-CM

## 2019-04-19 DIAGNOSIS — C34.90 MALIGNANT NEOPLASM OF UNSPECIFIED PART OF UNSPECIFIED BRONCHUS OR LUNG: ICD-10-CM

## 2019-04-19 DIAGNOSIS — Z00.00 ENCOUNTER FOR GENERAL ADULT MEDICAL EXAMINATION WITHOUT ABNORMAL FINDINGS: ICD-10-CM

## 2019-04-19 DIAGNOSIS — R91.8 OTHER NONSPECIFIC ABNORMAL FINDING OF LUNG FIELD: Chronic | ICD-10-CM

## 2019-04-19 DIAGNOSIS — Z90.710 ACQUIRED ABSENCE OF BOTH CERVIX AND UTERUS: Chronic | ICD-10-CM

## 2019-04-19 PROCEDURE — 71250 CT THORAX DX C-: CPT

## 2019-04-19 PROCEDURE — 71250 CT THORAX DX C-: CPT | Mod: 26

## 2019-04-29 NOTE — ASU PREOP CHECKLIST - TEMPERATURE IN FAHRENHEIT (DEGREES F)
Quality 110: Preventive Care And Screening: Influenza Immunization: Influenza Immunization not Administered because Patient Refused. Detail Level: Detailed Quality 130: Documentation Of Current Medications In The Medical Record: Current Medications Documented 97

## 2019-05-10 ENCOUNTER — APPOINTMENT (OUTPATIENT)
Dept: PULMONOLOGY | Facility: CLINIC | Age: 84
End: 2019-05-10
Payer: MEDICARE

## 2019-05-10 VITALS — DIASTOLIC BLOOD PRESSURE: 62 MMHG | OXYGEN SATURATION: 97 % | HEART RATE: 77 BPM | SYSTOLIC BLOOD PRESSURE: 124 MMHG

## 2019-05-10 DIAGNOSIS — I48.91 UNSPECIFIED ATRIAL FIBRILLATION: ICD-10-CM

## 2019-05-10 DIAGNOSIS — R91.1 SOLITARY PULMONARY NODULE: ICD-10-CM

## 2019-05-10 DIAGNOSIS — R06.02 SHORTNESS OF BREATH: ICD-10-CM

## 2019-05-10 DIAGNOSIS — J44.9 CHRONIC OBSTRUCTIVE PULMONARY DISEASE, UNSPECIFIED: ICD-10-CM

## 2019-05-10 PROCEDURE — 99215 OFFICE O/P EST HI 40 MIN: CPT

## 2019-05-10 NOTE — HISTORY OF PRESENT ILLNESS
[FreeTextEntry1] : 84-year-old female with a history of stage II to III Chronic obstructive lung disease, atrial fibrillation, status post treatment for stage III C non-small cell carcinomachronic, weight loss, seen today in followup. Patient has been complaining of increased dyspnea on exertion without complaints of cough, wheeze, or sputum production. There is no history of leg edema, paroxysmal nocturnal dyspnea, or orthopnea.\par \par She did undergo a followup CAT scan, which is included below

## 2019-05-10 NOTE — CONSULT LETTER
[Dear  ___] : Dear  [unfilled], [Consult Letter:] : I had the pleasure of evaluating your patient, [unfilled]. [Sincerely,] : Sincerely, [Please see my note below.] : Please see my note below. [DrHortencia  ___] : Dr. VELEZ [FreeTextEntry3] : Cabrera Pardo MD FCCP\par Pulmonary/Critical Care/Sleep Medicine\par Department of Internal Medicine\par \par [facility] [employer]\par

## 2019-05-10 NOTE — PHYSICAL EXAM
[General Appearance - Well Developed] : well developed [Normal Appearance] : normal appearance [Well Groomed] : well groomed [General Appearance - Well Nourished] : well nourished [No Deformities] : no deformities [General Appearance - In No Acute Distress] : no acute distress [Normal Conjunctiva] : the conjunctiva exhibited no abnormalities [Eyelids - No Xanthelasma] : the eyelids demonstrated no xanthelasmas [Normal Oropharynx] : normal oropharynx [Neck Appearance] : the appearance of the neck was normal [Neck Cervical Mass (___cm)] : no neck mass was observed [Jugular Venous Distention Increased] : there was no jugular-venous distention [Thyroid Diffuse Enlargement] : the thyroid was not enlarged [Thyroid Nodule] : there were no palpable thyroid nodules [Neck Circumference: ___] : neck circumference is [unfilled] [Heart Rate And Rhythm] : heart rate and rhythm were normal [Heart Sounds] : normal S1 and S2 [Murmurs] : no murmurs present [Respiration, Rhythm And Depth] : normal respiratory rhythm and effort [Exaggerated Use Of Accessory Muscles For Inspiration] : no accessory muscle use [Auscultation Breath Sounds / Voice Sounds] : lungs were clear to auscultation bilaterally [Surgical scars] : surgical scars [Abdomen Soft] : soft [Abdomen Tenderness] : non-tender [Abdomen Mass (___ Cm)] : no abdominal mass palpated [Abnormal Walk] : normal gait [Gait - Sufficient For Exercise Testing] : the gait was sufficient for exercise testing [Nail Clubbing] : no clubbing of the fingernails [Cyanosis, Localized] : no localized cyanosis [Petechial Hemorrhages (___cm)] : no petechial hemorrhages [Skin Turgor] : normal skin turgor [Deep Tendon Reflexes (DTR)] : deep tendon reflexes were 2+ and symmetric [Sensation] : the sensory exam was normal to light touch and pinprick [No Focal Deficits] : no focal deficits [Skin Color & Pigmentation] : normal skin color and pigmentation [] : no rash [No Venous Stasis] : no venous stasis [No Skin Ulcers] : no skin ulcer [Skin Lesions] : no skin lesions [No Xanthoma] : no  xanthoma was observed [FreeTextEntry1] : obese

## 2019-05-10 NOTE — DISCUSSION/SUMMARY
[FreeTextEntry1] : 84-year-old female, seen today for the above. Patient's complaints of increased dyspnea, not related to desaturations, and most likely on the basis of chronotropic insufficiency from her atrial fibrillation, as well as severe deconditioning. A long-acting muscarinic agent has been reinstituted for possible component of underlying COPD. In regard to her lung mass,  It appears to be decreasing in size and is most likely inflammatory in nature. Patient will undergo followup CAT scan in 6 months

## 2019-05-12 NOTE — CONSULT NOTE ADULT - PROBLEM SELECTOR PROBLEM 1
Delivery Note    Yue Downing is a 30 year old now  female post-delivery at 40w2d.  Pregnancy was significant for GBS colonized.    Mother's Information    Lacerations    Perineal laceration:  2nd Degree  Perineal laceration repaired?:  Yes  Periurethral laceration?:  Yes  Periurethral laceration location:  bilateral  Periurethral laceration repaired?:  Yes  Labial laceration?:  Yes  Labial laceration location:  right  Labial laceration repaired?:  Yes  Repair suture:  4-0 Vicryl, 3-0 Chromic  Number of repair packets:  3     IO Blood Loss  19 1215 - 199    None           Chang, Girl Yue [[id number]]    [place] Delivery    Birth date/time:  2019 19:31:00  Delivery type:  Vaginal, Spontaneous     Labor Events     labor?:  No   steroids?:  None  Antibiotics during labor?:  Yes  Sac identifier:  Sac 1  Rupture date/time:  2019 135  Rupture type:  Artificial  Fluid color:  Clear  Fluid odor:  Normal  Induction:  Oxytocin  Induction date/time:  2019  Induction indications:  Elective  Augmentation:  AROM  Augmentation date/time:  2019 1356  Augmentation indications:  Ineffective Contraction Pattern  Labor/Delivery complications:  None     Anesthesia    Method:  Epidural     Operative Delivery    Forceps attempted?:  No  Vacuum extractor attempted?:  No     Shoulder Dystocia    Shoulder dystocia present?:  No     Procedures    Procedures:  None     Presentation    Presentation:  Vertex     Placenta    Placenta delivery date/time:  2019  Placenta removal:  Spontaneous  Placenta appearance:  Intact  Placenta disposition:  discarded     Cord    Vessels:  3 Vessels  Complications:  None  Delayed cord clamping?:  No  Cord clamped date/time:  2019  Cord blood disposition:  Lab  Gases sent?:  No  Stem cell collection (by provider):  No     Apgars    Living status:  Living  Apgars:   1 min.:   5 min.:   10 min.:   15 min.:   20 min.:     Skin  color:   0  1       Heart rate:   2  2       Reflex irritability:   2  2       Muscle tone:   2  2       Respiratory effort:   2  2       Total:   8  9       Apgars assigned by:  MARI BOGGS RN     Resuscitation    Method:  None     Measurements    Weight:  3955 g  Length:  52.1 cm  Head circumference:  34.5 cm     Delivery Providers    Delivering clinician:  Lilli Hardin MD   Provider Role    Elsi Munguia, RN Delivery Nurse    Mari Boggs, RN Baby Nurse    [facility]               Review the Delivery Report for details       Lilli Hardin MD       Symptomatic anemia

## 2019-05-13 ENCOUNTER — MEDICATION RENEWAL (OUTPATIENT)
Age: 84
End: 2019-05-13

## 2019-05-13 RX ORDER — TIOTROPIUM BROMIDE 18 UG/1
18 CAPSULE ORAL; RESPIRATORY (INHALATION) DAILY
Qty: 1 | Refills: 2 | Status: DISCONTINUED | COMMUNITY
Start: 2019-05-10 | End: 2019-05-13

## 2019-05-13 RX ORDER — TIOTROPIUM BROMIDE INHALATION SPRAY 3.12 UG/1
2.5 SPRAY, METERED RESPIRATORY (INHALATION) DAILY
Qty: 1 | Refills: 5 | Status: ACTIVE | COMMUNITY
Start: 2019-05-13 | End: 1900-01-01

## 2019-05-13 RX ORDER — UMECLIDINIUM 62.5 UG/1
62.5 AEROSOL, POWDER ORAL DAILY
Qty: 1 | Refills: 5 | Status: DISCONTINUED | COMMUNITY
Start: 2019-05-10 | End: 2019-05-13

## 2019-06-01 ENCOUNTER — TRANSCRIPTION ENCOUNTER (OUTPATIENT)
Age: 84
End: 2019-06-01

## 2019-06-06 ENCOUNTER — INPATIENT (INPATIENT)
Facility: HOSPITAL | Age: 84
LOS: 1 days | DRG: 291 | End: 2019-06-08
Attending: INTERNAL MEDICINE | Admitting: INTERNAL MEDICINE
Payer: MEDICARE

## 2019-06-06 VITALS
HEIGHT: 64 IN | DIASTOLIC BLOOD PRESSURE: 67 MMHG | OXYGEN SATURATION: 94 % | HEART RATE: 107 BPM | RESPIRATION RATE: 22 BRPM | SYSTOLIC BLOOD PRESSURE: 109 MMHG | TEMPERATURE: 98 F | WEIGHT: 125 LBS

## 2019-06-06 DIAGNOSIS — I50.9 HEART FAILURE, UNSPECIFIED: ICD-10-CM

## 2019-06-06 DIAGNOSIS — Z90.710 ACQUIRED ABSENCE OF BOTH CERVIX AND UTERUS: Chronic | ICD-10-CM

## 2019-06-06 DIAGNOSIS — R91.8 OTHER NONSPECIFIC ABNORMAL FINDING OF LUNG FIELD: Chronic | ICD-10-CM

## 2019-06-06 DIAGNOSIS — Z90.11 ACQUIRED ABSENCE OF RIGHT BREAST AND NIPPLE: Chronic | ICD-10-CM

## 2019-06-06 LAB
ALBUMIN SERPL ELPH-MCNC: 3.9 G/DL — SIGNIFICANT CHANGE UP (ref 3.3–5.2)
ALP SERPL-CCNC: 40 U/L — SIGNIFICANT CHANGE UP (ref 40–120)
ALT FLD-CCNC: 8 U/L — SIGNIFICANT CHANGE UP
ANION GAP SERPL CALC-SCNC: 16 MMOL/L — SIGNIFICANT CHANGE UP (ref 5–17)
APTT BLD: 27.9 SEC — SIGNIFICANT CHANGE UP (ref 27.5–36.3)
AST SERPL-CCNC: 22 U/L — SIGNIFICANT CHANGE UP
BASOPHILS # BLD AUTO: 0 K/UL — SIGNIFICANT CHANGE UP (ref 0–0.2)
BASOPHILS NFR BLD AUTO: 0.2 % — SIGNIFICANT CHANGE UP (ref 0–2)
BILIRUB SERPL-MCNC: 1.1 MG/DL — SIGNIFICANT CHANGE UP (ref 0.4–2)
BUN SERPL-MCNC: 54 MG/DL — HIGH (ref 8–20)
CALCIUM SERPL-MCNC: 9.6 MG/DL — SIGNIFICANT CHANGE UP (ref 8.6–10.2)
CHLORIDE SERPL-SCNC: 96 MMOL/L — LOW (ref 98–107)
CO2 SERPL-SCNC: 25 MMOL/L — SIGNIFICANT CHANGE UP (ref 22–29)
CREAT SERPL-MCNC: 2.54 MG/DL — HIGH (ref 0.5–1.3)
EOSINOPHIL # BLD AUTO: 0 K/UL — SIGNIFICANT CHANGE UP (ref 0–0.5)
EOSINOPHIL NFR BLD AUTO: 0.5 % — SIGNIFICANT CHANGE UP (ref 0–6)
GLUCOSE SERPL-MCNC: 114 MG/DL — SIGNIFICANT CHANGE UP (ref 70–115)
HCT VFR BLD CALC: 40.1 % — SIGNIFICANT CHANGE UP (ref 37–47)
HGB BLD-MCNC: 12.9 G/DL — SIGNIFICANT CHANGE UP (ref 12–16)
INR BLD: 1.29 RATIO — HIGH (ref 0.88–1.16)
LYMPHOCYTES # BLD AUTO: 1.1 K/UL — SIGNIFICANT CHANGE UP (ref 1–4.8)
LYMPHOCYTES # BLD AUTO: 12.6 % — LOW (ref 20–55)
MCHC RBC-ENTMCNC: 27.7 PG — SIGNIFICANT CHANGE UP (ref 27–31)
MCHC RBC-ENTMCNC: 32.2 G/DL — SIGNIFICANT CHANGE UP (ref 32–36)
MCV RBC AUTO: 86.1 FL — SIGNIFICANT CHANGE UP (ref 81–99)
MONOCYTES # BLD AUTO: 1 K/UL — HIGH (ref 0–0.8)
MONOCYTES NFR BLD AUTO: 11.4 % — HIGH (ref 3–10)
NEUTROPHILS # BLD AUTO: 6.5 K/UL — SIGNIFICANT CHANGE UP (ref 1.8–8)
NEUTROPHILS NFR BLD AUTO: 75 % — HIGH (ref 37–73)
NT-PROBNP SERPL-SCNC: HIGH PG/ML (ref 0–300)
PLATELET # BLD AUTO: 259 K/UL — SIGNIFICANT CHANGE UP (ref 150–400)
POTASSIUM SERPL-MCNC: 3.5 MMOL/L — SIGNIFICANT CHANGE UP (ref 3.5–5.3)
POTASSIUM SERPL-SCNC: 3.5 MMOL/L — SIGNIFICANT CHANGE UP (ref 3.5–5.3)
PROT SERPL-MCNC: 7 G/DL — SIGNIFICANT CHANGE UP (ref 6.6–8.7)
PROTHROM AB SERPL-ACNC: 15 SEC — HIGH (ref 10–12.9)
RBC # BLD: 4.66 M/UL — SIGNIFICANT CHANGE UP (ref 4.4–5.2)
RBC # FLD: 17.3 % — HIGH (ref 11–15.6)
SODIUM SERPL-SCNC: 137 MMOL/L — SIGNIFICANT CHANGE UP (ref 135–145)
TROPONIN T SERPL-MCNC: 0.03 NG/ML — SIGNIFICANT CHANGE UP (ref 0–0.06)
WBC # BLD: 8.7 K/UL — SIGNIFICANT CHANGE UP (ref 4.8–10.8)
WBC # FLD AUTO: 8.7 K/UL — SIGNIFICANT CHANGE UP (ref 4.8–10.8)

## 2019-06-06 PROCEDURE — 71046 X-RAY EXAM CHEST 2 VIEWS: CPT | Mod: 26

## 2019-06-06 PROCEDURE — 99285 EMERGENCY DEPT VISIT HI MDM: CPT

## 2019-06-06 PROCEDURE — 93010 ELECTROCARDIOGRAM REPORT: CPT

## 2019-06-06 PROCEDURE — 99223 1ST HOSP IP/OBS HIGH 75: CPT | Mod: AI

## 2019-06-06 PROCEDURE — 71250 CT THORAX DX C-: CPT | Mod: 26

## 2019-06-06 RX ORDER — DILTIAZEM HCL 120 MG
180 CAPSULE, EXT RELEASE 24 HR ORAL ONCE
Refills: 0 | Status: DISCONTINUED | OUTPATIENT
Start: 2019-06-06 | End: 2019-06-06

## 2019-06-06 RX ORDER — DILTIAZEM HCL 120 MG
180 CAPSULE, EXT RELEASE 24 HR ORAL DAILY
Refills: 0 | Status: DISCONTINUED | OUTPATIENT
Start: 2019-06-06 | End: 2019-06-06

## 2019-06-06 RX ORDER — IPRATROPIUM/ALBUTEROL SULFATE 18-103MCG
3 AEROSOL WITH ADAPTER (GRAM) INHALATION ONCE
Refills: 0 | Status: COMPLETED | OUTPATIENT
Start: 2019-06-06 | End: 2019-06-06

## 2019-06-06 RX ORDER — FUROSEMIDE 40 MG
40 TABLET ORAL ONCE
Refills: 0 | Status: COMPLETED | OUTPATIENT
Start: 2019-06-06 | End: 2019-06-06

## 2019-06-06 RX ORDER — FENOFIBRATE,MICRONIZED 130 MG
145 CAPSULE ORAL DAILY
Refills: 0 | Status: DISCONTINUED | OUTPATIENT
Start: 2019-06-06 | End: 2019-06-08

## 2019-06-06 RX ORDER — METOPROLOL TARTRATE 50 MG
50 TABLET ORAL EVERY 6 HOURS
Refills: 0 | Status: DISCONTINUED | OUTPATIENT
Start: 2019-06-06 | End: 2019-06-07

## 2019-06-06 RX ORDER — LATANOPROST 0.05 MG/ML
1 SOLUTION/ DROPS OPHTHALMIC; TOPICAL AT BEDTIME
Refills: 0 | Status: DISCONTINUED | OUTPATIENT
Start: 2019-06-06 | End: 2019-06-08

## 2019-06-06 RX ORDER — ENOXAPARIN SODIUM 100 MG/ML
30 INJECTION SUBCUTANEOUS EVERY 24 HOURS
Refills: 0 | Status: DISCONTINUED | OUTPATIENT
Start: 2019-06-06 | End: 2019-06-08

## 2019-06-06 RX ORDER — IPRATROPIUM/ALBUTEROL SULFATE 18-103MCG
3 AEROSOL WITH ADAPTER (GRAM) INHALATION EVERY 6 HOURS
Refills: 0 | Status: DISCONTINUED | OUTPATIENT
Start: 2019-06-06 | End: 2019-06-08

## 2019-06-06 RX ORDER — METOPROLOL TARTRATE 50 MG
50 TABLET ORAL DAILY
Refills: 0 | Status: DISCONTINUED | OUTPATIENT
Start: 2019-06-06 | End: 2019-06-06

## 2019-06-06 RX ORDER — METOPROLOL TARTRATE 50 MG
50 TABLET ORAL ONCE
Refills: 0 | Status: COMPLETED | OUTPATIENT
Start: 2019-06-06 | End: 2019-06-06

## 2019-06-06 RX ORDER — FUROSEMIDE 40 MG
40 TABLET ORAL DAILY
Refills: 0 | Status: DISCONTINUED | OUTPATIENT
Start: 2019-06-06 | End: 2019-06-06

## 2019-06-06 RX ORDER — ASPIRIN/CALCIUM CARB/MAGNESIUM 324 MG
81 TABLET ORAL DAILY
Refills: 0 | Status: DISCONTINUED | OUTPATIENT
Start: 2019-06-06 | End: 2019-06-08

## 2019-06-06 RX ORDER — PANTOPRAZOLE SODIUM 20 MG/1
40 TABLET, DELAYED RELEASE ORAL
Refills: 0 | Status: DISCONTINUED | OUTPATIENT
Start: 2019-06-06 | End: 2019-06-08

## 2019-06-06 RX ADMIN — LATANOPROST 1 DROP(S): 0.05 SOLUTION/ DROPS OPHTHALMIC; TOPICAL at 22:51

## 2019-06-06 RX ADMIN — ENOXAPARIN SODIUM 30 MILLIGRAM(S): 100 INJECTION SUBCUTANEOUS at 22:51

## 2019-06-06 RX ADMIN — Medication 125 MILLIGRAM(S): at 13:20

## 2019-06-06 RX ADMIN — Medication 50 MILLIGRAM(S): at 15:53

## 2019-06-06 RX ADMIN — Medication 40 MILLIGRAM(S): at 15:53

## 2019-06-06 RX ADMIN — Medication 3 MILLILITER(S): at 13:25

## 2019-06-06 RX ADMIN — Medication 50 MILLIGRAM(S): at 22:57

## 2019-06-06 NOTE — H&P ADULT - ASSESSMENT
> Acute on Chronic Diastolic CHF - some improvement with diuretics.  Start on IV Lasix.  Monitor I&Os.  Serial Eric.  Cardiology evaluation.  TTE.  > COPD / h/o Lung Ca, ? cavitary lesion - pt has had prior CT surgery workup.  Pulmonary evaluation requested.  Duonebs, O2 prn.  > Afib - Rate stable. Continue Cardizem.  Not on anticoagulation.  > CKD Stage IV - Cr baseline ~ 2.  Monitor BMP.  Avoid nephrotoxic agents.  > GLaucoma - continue eyedrops  > DVT Prophylaxis - Lovenox subcut

## 2019-06-06 NOTE — ED PROVIDER NOTE - PROGRESS NOTE DETAILS
I spoke to  for admission. Call out to 's group but haven't called back. Lasix 40 mg IV and metoprolol 50 mg Po given, CT chest ordered.

## 2019-06-06 NOTE — ED PROVIDER NOTE - CLINICAL SUMMARY MEDICAL DECISION MAKING FREE TEXT BOX
85 yo F hx of afib, HTN, COPD p/w exertional dyspnea. patient given duoneb and solumedral earlier. Trop 0.03, probnp > 70,000.  pateint given lasix 40mg IV.  CXR showed cavitary lesion and infiltrate of right upper lobe, but patient has no fever and no elevated wbc. Antibiotic not given. CT chest ordered. cardiology consulted. will need admission for CHF.

## 2019-06-06 NOTE — CONSULT NOTE ADULT - SUBJECTIVE AND OBJECTIVE BOX
Cardiology Consult    CHIEF COMPLAINT: Heart failure    HISTORY OF PRESENT ILLNESS: MARIA ANTONIA CALLOWAY is a 84y old female with a history of chronic combined systolic and diastolic heart failure (last EF 35-40%), hypertensive heart disease with CHF, CAD s/p IWMI with an occluded distal LCX, moderate to severe MR, mild to moderate AI, chronic AFib, CKD stage 4 (baseline creatinine ~2), mixed hyperlipidemia, PVCs, PVD s/p left iliac artery stent and COPD who presents with increasing SOB and cough. Her dose of Toprol XL was increased, Cardizem CD was stopped and Imdur was started ~1 week ago. She says that she felt extremely fatigued and tried stopping the Imdur but continued feeling fatigued. She says that she continued taking her meds but was sleeping for most of the day and not eating or drinking much. She developed worsening SOB, has had chills but no documented fevers and today started coughing. She is currently comfortable on nasal cannula but she is still coughing. She admits to orthopnea but it is unclear if this is new or old. She denies any chest pain, palpitations, PND, abdominal pain, nausea/vomiting, diarrhea or syncope. She says that her legs typically swell up at the end of the day but are normal in the morning. She says that she has lost 50 pounds over the past 9 months.    PROBLEM LIST:    PAST MEDICAL HISTORY:  Breast cancer  GERD (gastroesophageal reflux disease)  Glaucoma  Chronic kidney disease  Lung mass  Afib  CAD (coronary artery disease)  CHF (congestive heart failure)  COPD (chronic obstructive pulmonary disease)    PAST SURGICAL HISTORY:  Lung mass  H/O mastectomy, right  H/O: hysterectomy    MEDICATIONS  (STANDING):  aspirin  chewable 81 milliGRAM(s) Oral daily  diltiazem    milliGRAM(s) Oral daily  enoxaparin Injectable 30 milliGRAM(s) SubCutaneous every 24 hours  fenofibrate Tablet 145 milliGRAM(s) Oral daily  furosemide   Injectable 40 milliGRAM(s) IV Push daily  latanoprost 0.005% Ophthalmic Solution 1 Drop(s) Both EYES at bedtime  metoprolol succinate ER 50 milliGRAM(s) Oral daily  pantoprazole    Tablet 40 milliGRAM(s) Oral before breakfast    MEDICATIONS  (PRN):  ALBUTerol/ipratropium for Nebulization 3 milliLiter(s) Nebulizer every 6 hours PRN Shortness of Breath and/or Wheezing    ALLERGIES:  No Known Allergies    SOCIAL HISTORY:  Tobacco: Quit in   Alcohol: denies  Drugs: denies    FAMILY HISTORY:  Dad - lung cancer  Sister - breast cancer    REVIEW OF SYSTEMS:  Constitutional: + fatigue, + chills, no fevers, lost 50 pounds over the past 9 months  HEENT: no visual disturbances, no hearing loss  Cardiac: no chest pain, no palpitations, + orthopnea, no PND  Respiratory: + shortness of breath, + cough  GI: + occasional abdominal pain with coughing, no blood in the stool, no N/V, no diarrhea  Urological: no dysuria, no hematuria  Hematological: no easy bruising or bleeding  Musculoskeletal: no joint pain, no back pain  Neurological: no headaches, no syncope  Psychiatric: no anxiety, no depression  Skin: no rashes    PHYSICAL EXAM:    Weight (kg): 56.7 ( @ 11:34)  T(C): 36.9 (19 @ 21:01), Max: 36.9 (19 @ 21:01)  T(F): 98.5 (19 @ 21:01), Max: 98.5 (19 @ 21:01)  HR: 118 (19 @ 21:01) (102 - 121)  BP: 109/65 (19 @ 21:01) (109/62 - 124/72)  RR: 23 (19 @ 21:01) (18 - 23)  SpO2: 100% (19 @ 21:01) (94% - 100%)  Wt(kg): --  Telemetry: AFib, HR 90s-140s  General: comfortable, in NAD  HEENT: EOMI, normocephalic, atraumatic  Neck: no JVD, no carotid bruits  Heart: +S1S2, irregularly irregular, 1/6 systolic murmur at the left 5th ICS, no rubs, no gallops  Lungs: clear to auscultation bilaterally, no wheezes, no rales, no rhonchi  Abdomen: soft, non-tender, non-distended, + bowel sounds  Extremities: no clubbing, no cyanosis, no edema  Vascular: 2+ dorsalis pedis pulses bilaterally  Neuro: A&O x3    EKG: AFib with RVR, , LAD, LBBB (old)    LABS:  Serum Pro-Brain Natriuretic Peptide: >23493 pg/mL (19 @ 12:38)    Troponin T, Serum: 0.03 ng/mL (19 @ 12:38)        CBC:            12.9   8.7   >-----------< 259     @ 12:38            40.1         CHEMISTRY:   137   |  96     |  54.0   ----------------------------< 114      @ 12:38    3.5   |  25.0   |  2.54     eGFR non-  17     eGFR   19         TPro --    / Alb 3.9   / TBili 1.1   / DBili --    / AST 22    / ALT 8     / AlkPhos 40      @ 12:38    COAGS:  PT/INR - ( 2019 12:38 )   PT: 15.0 sec;   INR: 1.29 ratio         PTT - ( 2019 12:38 )  PTT:27.9 sec    RADIOLOGY & ADDITIONAL TESTS:  CT Chest (19):  Small right pleural effusion. Trace left pleural effusion.  There is a peripheral infiltrate in the lateral segment of the right   middle lobe. Postoperative changes in the right lung.  Multiple bilateral renal hyperdense abnormalities, hyperdense cysts   versus solid lesions. These can be further evaluated with a CT scan of   the abdomen and pelvis with and without IV contrast when clinically   feasible.  No evidence of an intrathoracic cavity.      ASSESSMENT:  MARIA ANTONIA CALLOWAY is a 84y old female with a history of chronic combined systolic and diastolic heart failure (last EF 35-40%), hypertensive heart disease with CHF, CAD s/p IWMI with an occluded distal LCX, moderate to severe MR, mild to moderate AI, chronic AFib, CKD stage 4 (baseline creatinine ~2), mixed hyperlipidemia, PVCs, PVD s/p left iliac artery stent and COPD who presents with increasing SOB and cough and was noted to have acute kidney injury.    Please permit me to suggest the followin. The CT Chest is suggestive of a pneumonia, which would explain the fatigue, decreased appetite, chills, SOB and cough. She was given IV steroids in the ER. Consider Pulmonary consult and starting antibiotics.  2. I suspect that she is dehydrated from decreased po intake and continuing to take her diuretics. This would explain the mild acute kidney injury. I am not convinced that she is in any significant heart failure at this time. She actually appears relatively euvolemic on exam although she did have significant MR on her most recent out-patient Echo. I will therefore put her back on her home dose of torsemide 20 mg once daily.  3. Her heart rate in AFib has been uncontrolled. Her current meds are incorrect from what Dr. June had ordered as an out-patient. I will stop Cardizem CD and change Toprol XL to lopressor 50 mg q6 for rate control of AFib. If necessary, we can add back low dose short-acting diltiazem. No digoxin secondary to renal failure and I would avoid amiodarone since she is not on anticoagulation secondary to prior GI bleed requiring transfusion. If she cannot be adequately rate-controlled, she might benefit from AV candice ablation and Bi-V pacemaker. I will hold off on calling an EP consult until we see her response to the current medication changes, especially since she could have a pneumonia, which would be a reason to avoid implanting a pacemaker at this time.  4. She might have had mild acute on chronic combined systolic and diastolic heart failure on admission with small pleural effusions on CT however she currently appears to be euvolemic. She has a markedly elevated proBNP but she is also in renal failure. Awaiting a repeat Echo. She has a low EF but she should not receive an ACE or ARB secondary to renal failure. Depending on her response to tonight's med changes, I would consider trying her on Imdur at night (since she says it made her tired when taken during the day) and hydralazine.

## 2019-06-06 NOTE — ED ADULT TRIAGE NOTE - HEIGHT IN CM
Amor to call visiting nurses for home care. Diagnosis is severe debility and COPD.
Called patient to notifiy him that [facility] visiting nurses parde[facility] be contacting him
Patient would like an order sent to [facility] for home care. Please advise.
[facility] visiting nurses, with consult. Faxed over last 2 office notes.
[phone]

## 2019-06-06 NOTE — ED ADULT NURSE REASSESSMENT NOTE - NS ED NURSE REASSESS COMMENT FT1
Assumed patient responsibility at 1315. Patient resting comfortably in stretcher. Denies all forms of pain. Hemodynamically stable on 2LNC.

## 2019-06-06 NOTE — ED PROVIDER NOTE - PHYSICAL EXAMINATION
VITAL SIGNS: I have reviewed nursing notes and confirm.  CONSTITUTIONAL: Well-developed; well-nourished;  SKIN: Skin exam is warm and dry, no acute rash.  HEAD: Normocephalic; atraumatic.  EYES: PERRL, EOM intact; conjunctiva and sclera clear.  ENT: No nasal discharge; airway clear. Throat clear.  NECK: Supple; non tender.    CARD: S1, S2 normal; no murmurs, gallops, or rubs. Regular rate and rhythm.  RESP: No wheezes,  (+) mild crakle at base (+) tachypnea   ABD:  soft; non-distended; non-tender;   EXT: Normal ROM. No clubbing, cyanosis or edema.  NEURO: Alert, oriented. Grossly unremarkable. No focal deficits. no facial droop, moves all extremities,    PSYCH: Cooperative, appropriate.

## 2019-06-06 NOTE — ED ADULT NURSE NOTE - INTERVENTIONS DEFINITIONS
Stretcher in lowest position, wheels locked, appropriate side rails in place/Monitor gait and stability/Reinforce activity limits and safety measures with patient and family

## 2019-06-06 NOTE — H&P ADULT - NSICDXPASTMEDICALHX_GEN_ALL_CORE_FT
PAST MEDICAL HISTORY:  Afib     Breast cancer     CAD (coronary artery disease)     CHF (congestive heart failure)     Chronic kidney disease     COPD (chronic obstructive pulmonary disease)     GERD (gastroesophageal reflux disease)     Glaucoma     Lung mass

## 2019-06-06 NOTE — ED ADULT NURSE NOTE - CARDIO ASSESSMENT
--- Island Pedicle Flap Text: The defect edges were debeveled with a #15 scalpel blade.  Given the location of the defect, shape of the defect and the proximity to free margins an island pedicle advancement flap was deemed most appropriate.  Using a sterile surgical marker, an appropriate advancement flap was drawn incorporating the defect, outlining the appropriate donor tissue and placing the expected incisions within the relaxed skin tension lines where possible.    The area thus outlined was incised deep to adipose tissue with a #15 scalpel blade.  The skin margins were undermined to an appropriate distance in all directions around the primary defect and laterally outward around the island pedicle utilizing iris scissors.  There was minimal undermining beneath the pedicle flap.

## 2019-06-06 NOTE — H&P ADULT - HISTORY OF PRESENT ILLNESS
84y Female PMH h/o COPD, CHF, CAD, CKD Stage IV, Afib, Lung cancer, ? L lung mass s/p bronchoscopy 1/2019, presents c/o worsening SOB over past 5 days.  No orthopnea.  No cough / fever/ chills.  No chest pain / palpitations.  She feels it was made worse when she was recently started on Isosorbide by cardiology, which was stopped.  No additional complaints. CXR showed ? air fluid level overlying L heart.  Pt currently reports feeling better.  No additional complaints.     FAMILY HISTORY: reviewed and negative sudden cardiac death in mother, father.   SOCIAL HISTORY: - alcohol, - IVDA, + smoking quit 16y ago.   REVIEW OF SYSTEMS: General: + fatigue, - weight loss, - fevers, - chills.  HEENT: - headache, - hearing disturbances.  EYES: - visual disturbances, - diplopia.  CARDIOVASCULAR: - chest pain, - palpitations.  PULMONARY: + SOB, - cough, - hemoptysis.  GI: - abdominal pain, - nausea, - vomiting, - diarrhea, - constipation.  : - urinary urgency, - frequency, - dysuria.  MUSCULOSKELETAL: - arthralgias, - myalgias.  NEURO:  - weakness, - numbness.  PSYCH: - depression, - anxiety, - suicidal thoughts. SKIN: - rashes, - lesions.  All remaining ROS are negative.Allergies    No Known Allergies    Intolerances

## 2019-06-06 NOTE — ED PROVIDER NOTE - NS ED ROS FT
Review of Systems  •	CONSTITUTIONAL - no  fever, no diaphoresis, no weight change  •	SKIN - no rash  •	HEMATOLOGIC - no bleeding, no bruising  •	EYES - no eye pain, no blurred vision  •	ENT - no change in hearing, no pain  •	RESPIRATORY -  (+)  shortness of breath, no cough  •	CARDIAC - no chest pain, no palpitations  •	GI - no abd pain, no nausea, no vomiting, no diarrhea, no constipation, no bleeding  •	GENITO-URINARY - no discharge, no dysuria; no hematuria,   •	ENDO - no polydypsia, no polyurea, no heat/no cold intolerance  •	MUSCULOSKELETAL - no joint pain, no swelling, no redness  •	NEUROLOGIC -  (+) generalized weakness, no headache, no anesthesia, no paresthesias  •	PSYCH - no anxiety, non suicidal, non homicidal, no hallucination, no depression  '

## 2019-06-06 NOTE — ED PROVIDER NOTE - OBJECTIVE STATEMENT
83 yo F hx of HTN, COPD (not on home O2) p/w 5 days of exertional dyspnea. No productive cough, no chest pain, no fever. She was recently started on isosorbite and stopped 3 days ago because it was making her tired. She missed her inhaler today. She also report green discharge from right eye x 6 month and that her PMD was treating for it.     cards:

## 2019-06-06 NOTE — ED ADULT TRIAGE NOTE - CHIEF COMPLAINT QUOTE
Pt with hx of COPD arrives with c/o 3 days of worsening SOB. States was recently started on Isosorbide but has stopped taking it bc it makes pt sleepy. Pt is noted to be tachypneic but states the oxygen has helped her feel better. Pt denies chest pain, fever/chills. Pt also noted to have green discharge to R eye.

## 2019-06-07 LAB
ANION GAP SERPL CALC-SCNC: 16 MMOL/L — SIGNIFICANT CHANGE UP (ref 5–17)
BUN SERPL-MCNC: 60 MG/DL — HIGH (ref 8–20)
CALCIUM SERPL-MCNC: 9.3 MG/DL — SIGNIFICANT CHANGE UP (ref 8.6–10.2)
CHLORIDE SERPL-SCNC: 98 MMOL/L — SIGNIFICANT CHANGE UP (ref 98–107)
CK SERPL-CCNC: 93 U/L — SIGNIFICANT CHANGE UP (ref 25–170)
CO2 SERPL-SCNC: 23 MMOL/L — SIGNIFICANT CHANGE UP (ref 22–29)
CREAT SERPL-MCNC: 2.42 MG/DL — HIGH (ref 0.5–1.3)
GLUCOSE SERPL-MCNC: 149 MG/DL — HIGH (ref 70–115)
HCT VFR BLD CALC: 40.8 % — SIGNIFICANT CHANGE UP (ref 37–47)
HGB BLD-MCNC: 13 G/DL — SIGNIFICANT CHANGE UP (ref 12–16)
MCHC RBC-ENTMCNC: 27.5 PG — SIGNIFICANT CHANGE UP (ref 27–31)
MCHC RBC-ENTMCNC: 31.9 G/DL — LOW (ref 32–36)
MCV RBC AUTO: 86.3 FL — SIGNIFICANT CHANGE UP (ref 81–99)
PLATELET # BLD AUTO: 239 K/UL — SIGNIFICANT CHANGE UP (ref 150–400)
POTASSIUM SERPL-MCNC: 3.9 MMOL/L — SIGNIFICANT CHANGE UP (ref 3.5–5.3)
POTASSIUM SERPL-SCNC: 3.9 MMOL/L — SIGNIFICANT CHANGE UP (ref 3.5–5.3)
RBC # BLD: 4.73 M/UL — SIGNIFICANT CHANGE UP (ref 4.4–5.2)
RBC # FLD: 17.3 % — HIGH (ref 11–15.6)
SODIUM SERPL-SCNC: 137 MMOL/L — SIGNIFICANT CHANGE UP (ref 135–145)
TROPONIN T SERPL-MCNC: 0.02 NG/ML — SIGNIFICANT CHANGE UP (ref 0–0.06)
WBC # BLD: 5.5 K/UL — SIGNIFICANT CHANGE UP (ref 4.8–10.8)
WBC # FLD AUTO: 5.5 K/UL — SIGNIFICANT CHANGE UP (ref 4.8–10.8)

## 2019-06-07 PROCEDURE — 99222 1ST HOSP IP/OBS MODERATE 55: CPT

## 2019-06-07 PROCEDURE — 99233 SBSQ HOSP IP/OBS HIGH 50: CPT

## 2019-06-07 RX ORDER — AZITHROMYCIN 500 MG/1
500 TABLET, FILM COATED ORAL ONCE
Refills: 0 | Status: COMPLETED | OUTPATIENT
Start: 2019-06-07 | End: 2019-06-07

## 2019-06-07 RX ORDER — AZITHROMYCIN 500 MG/1
500 TABLET, FILM COATED ORAL EVERY 24 HOURS
Refills: 0 | Status: DISCONTINUED | OUTPATIENT
Start: 2019-06-08 | End: 2019-06-08

## 2019-06-07 RX ORDER — CEFTRIAXONE 500 MG/1
INJECTION, POWDER, FOR SOLUTION INTRAMUSCULAR; INTRAVENOUS
Refills: 0 | Status: DISCONTINUED | OUTPATIENT
Start: 2019-06-07 | End: 2019-06-08

## 2019-06-07 RX ORDER — POTASSIUM CHLORIDE 20 MEQ
20 PACKET (EA) ORAL ONCE
Refills: 0 | Status: COMPLETED | OUTPATIENT
Start: 2019-06-07 | End: 2019-06-07

## 2019-06-07 RX ORDER — AZITHROMYCIN 500 MG/1
TABLET, FILM COATED ORAL
Refills: 0 | Status: DISCONTINUED | OUTPATIENT
Start: 2019-06-07 | End: 2019-06-08

## 2019-06-07 RX ORDER — FUROSEMIDE 40 MG
40 TABLET ORAL
Refills: 0 | Status: DISCONTINUED | OUTPATIENT
Start: 2019-06-07 | End: 2019-06-08

## 2019-06-07 RX ORDER — METOPROLOL TARTRATE 50 MG
100 TABLET ORAL THREE TIMES A DAY
Refills: 0 | Status: DISCONTINUED | OUTPATIENT
Start: 2019-06-07 | End: 2019-06-08

## 2019-06-07 RX ORDER — CEFTRIAXONE 500 MG/1
1 INJECTION, POWDER, FOR SOLUTION INTRAMUSCULAR; INTRAVENOUS ONCE
Refills: 0 | Status: COMPLETED | OUTPATIENT
Start: 2019-06-07 | End: 2019-06-07

## 2019-06-07 RX ORDER — CEFTRIAXONE 500 MG/1
1 INJECTION, POWDER, FOR SOLUTION INTRAMUSCULAR; INTRAVENOUS EVERY 24 HOURS
Refills: 0 | Status: DISCONTINUED | OUTPATIENT
Start: 2019-06-08 | End: 2019-06-08

## 2019-06-07 RX ADMIN — AZITHROMYCIN 255 MILLIGRAM(S): 500 TABLET, FILM COATED ORAL at 09:18

## 2019-06-07 RX ADMIN — Medication 40 MILLIGRAM(S): at 13:19

## 2019-06-07 RX ADMIN — Medication 50 MILLIGRAM(S): at 05:51

## 2019-06-07 RX ADMIN — Medication 145 MILLIGRAM(S): at 13:20

## 2019-06-07 RX ADMIN — ENOXAPARIN SODIUM 30 MILLIGRAM(S): 100 INJECTION SUBCUTANEOUS at 22:07

## 2019-06-07 RX ADMIN — Medication 100 MILLIGRAM(S): at 21:37

## 2019-06-07 RX ADMIN — Medication 20 MILLIEQUIVALENT(S): at 13:28

## 2019-06-07 RX ADMIN — PANTOPRAZOLE SODIUM 40 MILLIGRAM(S): 20 TABLET, DELAYED RELEASE ORAL at 05:51

## 2019-06-07 RX ADMIN — CEFTRIAXONE 100 GRAM(S): 500 INJECTION, POWDER, FOR SOLUTION INTRAMUSCULAR; INTRAVENOUS at 13:18

## 2019-06-07 RX ADMIN — Medication 40 MILLIGRAM(S): at 21:43

## 2019-06-07 RX ADMIN — LATANOPROST 1 DROP(S): 0.05 SOLUTION/ DROPS OPHTHALMIC; TOPICAL at 21:37

## 2019-06-07 RX ADMIN — Medication 81 MILLIGRAM(S): at 13:20

## 2019-06-07 NOTE — CONSULT NOTE ADULT - SUBJECTIVE AND OBJECTIVE BOX
PULMONARY CONSULT NOTE      NOLAN CALLOWAYIDN-[id number]    Patient is a 84y old  Female who presents with a chief complaint of SOB (07 Jun 2019 14:11)      HISTORY OF PRESENT ILLNESS: Hx of COPD (FEV1 47% pred on latest dorita), NSCLC, new abnormality on CT chest for which she underwent a FOB 1/11/19 which was nondx so IR TTNBx planned but mass had shrunk. Hx also of CHF, CAD, CKD Stage IV, Afib, Presented c/o worsening SOB over past 5 days.  No orthopnea.  No cough / fever/ chills.  No chest pain / palpitations.  She feels it was made worse when she was recently started on Isosorbide by cardiology, which was stopped.  No additional complaints. CXR showed ? air fluid level overlying L heart.  Pt currently reports feeling better.  No additional complaints. ECHO just done which reportedly showed worsening LV fxn from recent echo (<20%).     MEDICATIONS  (STANDING):  aspirin  chewable 81 milliGRAM(s) Oral daily  azithromycin  IVPB      cefTRIAXone   IVPB      enoxaparin Injectable 30 milliGRAM(s) SubCutaneous every 24 hours  fenofibrate Tablet 145 milliGRAM(s) Oral daily  furosemide   Injectable 40 milliGRAM(s) IV Push two times a day  latanoprost 0.005% Ophthalmic Solution 1 Drop(s) Both EYES at bedtime  metoprolol tartrate 100 milliGRAM(s) Oral three times a day  pantoprazole    Tablet 40 milliGRAM(s) Oral before breakfast      MEDICATIONS  (PRN):  ALBUTerol/ipratropium for Nebulization 3 milliLiter(s) Nebulizer every 6 hours PRN Shortness of Breath and/or Wheezing      Allergies    No Known Allergies    Intolerances        PAST MEDICAL & SURGICAL HISTORY:  Breast cancer  GERD (gastroesophageal reflux disease)  Glaucoma  Chronic kidney disease  Lung mass  Afib  CAD (coronary artery disease)  CHF (congestive heart failure)  COPD (chronic obstructive pulmonary disease)  Lung mass: excision 2009  H/O mastectomy, right  H/O: hysterectomy      FAMILY HISTORY:  No pertinent family history in first degree relatives      SOCIAL HISTORY  Smoking History: former smoker    REVIEW OF SYSTEMS:    CONSTITUTIONAL:  No fevers, chills, sweats    HEENT:  Eyes:  No diplopia or blurred vision. ENT:  No earache, sore throat or runny nose.    CARDIOVASCULAR:  No pressure, squeezing, tightness, or heaviness about the chest; no palpitations.    RESPIRATORY: per HPI      GASTROINTESTINAL:  per HPI    GENITOURINARY:  No dysuria, frequency or urgency.    NEUROLOGIC:  No paresthesias, fasciculations, seizures or weakness.    PSYCHIATRIC:  No disorder of thought or mood.    Vital Signs Last 24 Hrs  T(C): 36.4 (07 Jun 2019 05:42), Max: 36.9 (06 Jun 2019 21:01)  T(F): 97.6 (07 Jun 2019 05:42), Max: 98.5 (06 Jun 2019 21:01)  HR: 102 (07 Jun 2019 13:14) (100 - 121)  BP: 96/62 (07 Jun 2019 13:14) (96/62 - 124/72)  BP(mean): --  RR: 18 (07 Jun 2019 05:42) (18 - 23)  SpO2: 100% (07 Jun 2019 05:42) (96% - 100%)    PHYSICAL EXAMINATION:    GENERAL: The patient is in no apparent distress.     HEENT: Head is normocephalic and atraumatic. R eye chronic changes, abnormal iris/pupil, Mucous membranes are moist.     NECK: Supple.     LUNGS: Clear to auscultation without wheezing, rales, or rhonchi. Respirations unlabored    HEART: Regular rate and rhythm     ABDOMEN: Soft, nontender, and nondistended.      EXTREMITIES: Without any cyanosis, clubbing, rash, lesions or edema.    NEUROLOGIC: Grossly intact.      LABS:                        13.0   5.5   )-----------( 239      ( 07 Jun 2019 06:30 )             40.8     06-07    137  |  98  |  60.0<H>  ----------------------------<  149<H>  3.9   |  23.0  |  2.42<H>    Ca    9.3      07 Jun 2019 06:30    TPro  7.0  /  Alb  3.9  /  TBili  1.1  /  DBili  x   /  AST  22  /  ALT  8   /  AlkPhos  40  06-06    PT/INR - ( 06 Jun 2019 12:38 )   PT: 15.0 sec;   INR: 1.29 ratio         PTT - ( 06 Jun 2019 12:38 )  PTT:27.9 sec      CARDIAC MARKERS ( 06 Jun 2019 23:44 )  x     / 0.02 ng/mL / 93 U/L / x     / x      CARDIAC MARKERS ( 06 Jun 2019 12:38 )  x     / 0.03 ng/mL / x     / x     / x            Serum Pro-Brain Natriuretic Peptide: >65508 pg/mL (06-06-19 @ 12:38)         RADIOLOGY & ADDITIONAL STUDIES:  < from: CT Chest No Cont (06.06.19 @ 16:52) >     EXAM:  CT CHEST                          PROCEDURE DATE:  06/06/2019          INTERPRETATION:  CLINICAL INFORMATION: Pulmonary cavity. Abnormal chest   x-ray.    COMPARISON: Chest x-ray performed the same day.    PROCEDURE:   CT of the Chest was performed without intravenous contrast.  Sagittal and coronal reformats were performed.      FINDINGS:    No evidence of mediastinal or hilar lymphadenopathy. Small right pleural   effusion. Trace left pleural effusion.    Cardiomegaly. No evidence of a pericardial effusion.    No evidence of axillary adenopathy. Small calcified nodules in the right   thyroid lobe.    Images obtained through the upper abdomen demonstrate cholelithiasis.   Bilateral hyperdense structures arising from the kidneys, presumably   hyperdense cysts although solid renal lesions cannot be excluded on this   noncontrast study. There is an incompletely visualized abdominal aortic   aneurysm measuring 2.7 cm.    There is a subcutaneous fat-containing density anterior to the right   anterior abdominal wall. This is of uncertain etiology but may be related   to a large abdominal hernia. A CT scan of the abdomen and pelvis would be   helpful in further evaluation of this finding.    There is evidence of a prior right mastectomy.    Left lung is clear. There is a peripheral infiltrate in the lateral   segment of the right middle lobe. Postoperative changes in the right   upper lobe. Postoperative changes in the right lower lobe. No evidence of   a pulmonary cavity or abscess.    Degenerative changes in the thoracic spine.      IMPRESSION:     Small right pleural effusion. Trace left pleural effusion.    There is a peripheral infiltrate in the lateral segment of the right   middle lobe. Postoperative changes in the right lung.    Multiple bilateral renal hyperdense abnormalities, hyperdense cysts   versus solid lesions. These can be further evaluated with a CT scan of   the abdomen and pelvis with and without IV contrast when clinically   feasible.    No evidence of an intrathoracic cavity.                          LEANNA RAE M.D., ATTENDING RADIOLOGIST    < end of copied text >  < from: Xray Chest 2 Views PA/Lat (06.06.19 @ 14:11) >     EXAM:  XR CHEST PA LAT 2V                          PROCEDURE DATE:  06/06/2019          INTERPRETATION:    CHEST X-RAY PA AND LATERAL:    History: Chest pain.    Date and time of exam: 6/6/2019 2:11 PM.    Comparison exam: 3/15/2019.    Technique:PA and lateral views of the chest were obtained.    Findings:  There is an air-fluid level noted on the lateral view superimposed over   the heart. This may represent a hydropneumothorax or a pulmonary cavity.   This finding cannot be seen on the frontal view. There also appears to be   an infiltrate in the region of the left upper lobe. Cardio megaly.   Tortuous, calcified aorta.    Impression:  As above. CT scan of the chest recommended..                LEANNA RAE M.D., ATTENDING RADIOLOGIST    < end of copied text >

## 2019-06-07 NOTE — CONSULT NOTE ADULT - ASSESSMENT
-Respiratory failure, sob; main reason for decompensation appears due to CHF though other factors as below  -COPD  -Pneumonia  -Abnormal Chest ct; was getting w/u for recurrence of lung cancer; planned in TTNBx but lesion had shrunk  -Hx lung cancer  -CKD   -Orthopnea    RECC:  Abx. Diurese. O2. Nebs. Follow lytes. Mgmt of CHF per cardiology. DVT prophylaxis. Need to follow ct changes as outpt.

## 2019-06-08 VITALS
RESPIRATION RATE: 16 BRPM | HEART RATE: 117 BPM | OXYGEN SATURATION: 100 % | DIASTOLIC BLOOD PRESSURE: 64 MMHG | SYSTOLIC BLOOD PRESSURE: 102 MMHG

## 2019-06-08 DIAGNOSIS — I46.9 CARDIAC ARREST, CAUSE UNSPECIFIED: ICD-10-CM

## 2019-06-08 LAB
ANION GAP SERPL CALC-SCNC: 16 MMOL/L — SIGNIFICANT CHANGE UP (ref 5–17)
BUN SERPL-MCNC: 63 MG/DL — HIGH (ref 8–20)
CALCIUM SERPL-MCNC: 9.3 MG/DL — SIGNIFICANT CHANGE UP (ref 8.6–10.2)
CHLORIDE SERPL-SCNC: 94 MMOL/L — LOW (ref 98–107)
CO2 SERPL-SCNC: 23 MMOL/L — SIGNIFICANT CHANGE UP (ref 22–29)
CREAT SERPL-MCNC: 2.41 MG/DL — HIGH (ref 0.5–1.3)
GLUCOSE BLDC GLUCOMTR-MCNC: 248 MG/DL — HIGH (ref 70–99)
GLUCOSE SERPL-MCNC: 194 MG/DL — HIGH (ref 70–115)
HCT VFR BLD CALC: 41.6 % — SIGNIFICANT CHANGE UP (ref 37–47)
HGB BLD-MCNC: 13.3 G/DL — SIGNIFICANT CHANGE UP (ref 12–16)
MAGNESIUM SERPL-MCNC: 2.5 MG/DL — SIGNIFICANT CHANGE UP (ref 1.6–2.6)
MCHC RBC-ENTMCNC: 27.8 PG — SIGNIFICANT CHANGE UP (ref 27–31)
MCHC RBC-ENTMCNC: 32 G/DL — SIGNIFICANT CHANGE UP (ref 32–36)
MCV RBC AUTO: 87 FL — SIGNIFICANT CHANGE UP (ref 81–99)
PLATELET # BLD AUTO: 275 K/UL — SIGNIFICANT CHANGE UP (ref 150–400)
POTASSIUM SERPL-MCNC: 3.8 MMOL/L — SIGNIFICANT CHANGE UP (ref 3.5–5.3)
POTASSIUM SERPL-SCNC: 3.8 MMOL/L — SIGNIFICANT CHANGE UP (ref 3.5–5.3)
RBC # BLD: 4.78 M/UL — SIGNIFICANT CHANGE UP (ref 4.4–5.2)
RBC # FLD: 17.4 % — HIGH (ref 11–15.6)
SODIUM SERPL-SCNC: 133 MMOL/L — LOW (ref 135–145)
WBC # BLD: 9.9 K/UL — SIGNIFICANT CHANGE UP (ref 4.8–10.8)
WBC # FLD AUTO: 9.9 K/UL — SIGNIFICANT CHANGE UP (ref 4.8–10.8)

## 2019-06-08 PROCEDURE — 71046 X-RAY EXAM CHEST 2 VIEWS: CPT

## 2019-06-08 PROCEDURE — 85610 PROTHROMBIN TIME: CPT

## 2019-06-08 PROCEDURE — 83735 ASSAY OF MAGNESIUM: CPT

## 2019-06-08 PROCEDURE — 80053 COMPREHEN METABOLIC PANEL: CPT

## 2019-06-08 PROCEDURE — 93306 TTE W/DOPPLER COMPLETE: CPT

## 2019-06-08 PROCEDURE — 71250 CT THORAX DX C-: CPT

## 2019-06-08 PROCEDURE — 36415 COLL VENOUS BLD VENIPUNCTURE: CPT

## 2019-06-08 PROCEDURE — 93005 ELECTROCARDIOGRAM TRACING: CPT

## 2019-06-08 PROCEDURE — 99233 SBSQ HOSP IP/OBS HIGH 50: CPT

## 2019-06-08 PROCEDURE — 82550 ASSAY OF CK (CPK): CPT

## 2019-06-08 PROCEDURE — 85730 THROMBOPLASTIN TIME PARTIAL: CPT

## 2019-06-08 PROCEDURE — 96375 TX/PRO/DX INJ NEW DRUG ADDON: CPT

## 2019-06-08 PROCEDURE — 99291 CRITICAL CARE FIRST HOUR: CPT

## 2019-06-08 PROCEDURE — 99285 EMERGENCY DEPT VISIT HI MDM: CPT | Mod: 25

## 2019-06-08 PROCEDURE — 84484 ASSAY OF TROPONIN QUANT: CPT

## 2019-06-08 PROCEDURE — 85027 COMPLETE CBC AUTOMATED: CPT

## 2019-06-08 PROCEDURE — 94640 AIRWAY INHALATION TREATMENT: CPT

## 2019-06-08 PROCEDURE — 96374 THER/PROPH/DIAG INJ IV PUSH: CPT

## 2019-06-08 PROCEDURE — 80048 BASIC METABOLIC PNL TOTAL CA: CPT

## 2019-06-08 PROCEDURE — 83880 ASSAY OF NATRIURETIC PEPTIDE: CPT

## 2019-06-08 PROCEDURE — 82962 GLUCOSE BLOOD TEST: CPT

## 2019-06-08 RX ORDER — MORPHINE SULFATE 50 MG/1
4 CAPSULE, EXTENDED RELEASE ORAL ONCE
Refills: 0 | Status: DISCONTINUED | OUTPATIENT
Start: 2019-06-08 | End: 2019-06-08

## 2019-06-08 RX ORDER — METOPROLOL TARTRATE 50 MG
100 TABLET ORAL THREE TIMES A DAY
Refills: 0 | Status: DISCONTINUED | OUTPATIENT
Start: 2019-06-08 | End: 2019-06-08

## 2019-06-08 RX ORDER — MORPHINE SULFATE 50 MG/1
4 CAPSULE, EXTENDED RELEASE ORAL
Refills: 0 | Status: DISCONTINUED | OUTPATIENT
Start: 2019-06-08 | End: 2019-06-08

## 2019-06-08 RX ADMIN — Medication 40 MILLIGRAM(S): at 06:38

## 2019-06-08 RX ADMIN — PANTOPRAZOLE SODIUM 40 MILLIGRAM(S): 20 TABLET, DELAYED RELEASE ORAL at 06:38

## 2019-06-08 RX ADMIN — CEFTRIAXONE 100 GRAM(S): 500 INJECTION, POWDER, FOR SOLUTION INTRAMUSCULAR; INTRAVENOUS at 09:12

## 2019-06-08 RX ADMIN — MORPHINE SULFATE 4 MILLIGRAM(S): 50 CAPSULE, EXTENDED RELEASE ORAL at 11:45

## 2019-06-08 RX ADMIN — AZITHROMYCIN 255 MILLIGRAM(S): 500 TABLET, FILM COATED ORAL at 09:11

## 2019-06-08 RX ADMIN — Medication 1 MILLIGRAM(S): at 11:45

## 2019-06-08 RX ADMIN — Medication 100 MILLIGRAM(S): at 09:43

## 2019-06-08 NOTE — DISCHARGE NOTE FOR THE EXPIRED PATIENT - SECONDARY DIAGNOSIS.
Chronic obstructive pulmonary disease, unspecified COPD type Chronic kidney disease (CKD), stage IV (severe) Lung mass

## 2019-06-08 NOTE — DISCHARGE NOTE FOR THE EXPIRED PATIENT - HOSPITAL COURSE
Initial HPI:  84y Female PMH h/o COPD, CHF, CAD, CKD Stage IV, Afib, Lung cancer, ? L lung mass s/p bronchoscopy 1/2019, presents c/o worsening SOB over past 5 days.  No orthopnea.  No cough / fever/ chills.  No chest pain / palpitations.  BNP > 70k, admitted for acute CHF exacerbation.  TTE performed showing EF < 20%.  CT Chest showed peripheral infiltrate RML.     Interval History:  Today, patient was seen as part of rapid response.  RN was at bedside, states pt became unresponsive after using bedside commode, subsequently became apneic and pulseless.  Code blue was called and ACLS was initiated.  During code blue, daughter Sushil Thornton was contacted at [phone] - stated patient with known CKD, COPD and progerssively worsening CHF now found to have EF < 20%.  States her mother would never have wanted to be intubated under these circumstances.  Pt found to have a faint pulse with BP 50/P.  Placed on comfort care measures per family wishes.

## 2019-06-08 NOTE — PROGRESS NOTE ADULT - ASSESSMENT
-Respiratory failure, sob; main reason for decompensation appears due to CHF though other factors as below  -COPD  -Pneumonia  -Abnormal Chest ct; was getting w/u for recurrence of lung cancer; planned in TTNBx but lesion had shrunk  -Hx lung cancer  -CKD   -Orthopnea    RECC:  Abx. Diurese. O2. Nebs. Follow lytes. Mgmt of CHF per cardiology. Follow lytes. DVT prophylaxis. Need to follow ct changes as outpt.
84 YOF with cardiopulmonary arrest
> Acute on Chronic Systolic and Diastolic CHF - some improvement with diuretics.  Continue on IV Lasix.  Monitor I&Os.  Cardiology evaluation appreciated.  ? role for inotropes.    > RML Pneumonia - started on antibiotics.    > COPD / h/o Lung Ca - CT chest did not reveal any cavitary lesion.  Continue duonebs, O2 prn.  > Afib - Rate stable. Continue Cardizem.  Not on anticoagulation.  > CKD Stage IV - Cr baseline ~ 2.  Monitor BMP.  Avoid nephrotoxic agents. Renal evaluation requested.   > GLaucoma - continue eyedrops  > DVT Prophylaxis - Lovenox subcut
Acute on Chronic Systolic and DIastolic CHF, RML Pna, COPD, h/o Lung Cancer, Stage IV CKD, Afib  Pulmonary and Cardiology input was noted, discussed with Dr. Sanon.  Pt's daughter reports she has had clear conversations with patient, expressing desire never to be subjected to resuscitative measures.  DNR / DNI.  Started on Morphine.  Daughter aware of pt's expected decline.  Total time spent = 60 minutes.

## 2019-06-08 NOTE — PROGRESS NOTE ADULT - SUBJECTIVE AND OBJECTIVE BOX
Cardiology Follow-up    MARIA ANTONIA CALLOWAY was seen and examined. No events overnight. She gets SOB when she lays flat and still feels SOB at rest. She becomes tachycardic to the 130s with any exertion. She denies any chest pain, palpitations, PND or abdominal pain.    PROBLEM LIST:    PAST MEDICAL HISTORY:  Breast cancer  GERD (gastroesophageal reflux disease)  Glaucoma  Chronic kidney disease  Lung mass  Afib  CAD (coronary artery disease)  CHF (congestive heart failure)  COPD (chronic obstructive pulmonary disease)    PAST SURGICAL HISTORY:  Lung mass  H/O mastectomy, right  H/O: hysterectomy    MEDICATIONS  (STANDING):  aspirin  chewable 81 milliGRAM(s) Oral daily  azithromycin  IVPB      cefTRIAXone   IVPB 1 Gram(s) IV Intermittent once  cefTRIAXone   IVPB      enoxaparin Injectable 30 milliGRAM(s) SubCutaneous every 24 hours  fenofibrate Tablet 145 milliGRAM(s) Oral daily  latanoprost 0.005% Ophthalmic Solution 1 Drop(s) Both EYES at bedtime  metoprolol tartrate 50 milliGRAM(s) Oral every 6 hours  pantoprazole    Tablet 40 milliGRAM(s) Oral before breakfast  torsemide 20 milliGRAM(s) Oral daily    MEDICATIONS  (PRN):  ALBUTerol/ipratropium for Nebulization 3 milliLiter(s) Nebulizer every 6 hours PRN Shortness of Breath and/or Wheezing    ALLERGIES:  No Known Allergies    I&O's Summary    2019 07:  -  2019 07:00  --------------------------------------------------------  IN: 120 mL / OUT: 0 mL / NET: 120 mL      PHYSICAL EXAM:  T(F): 97.6 (19 @ 05:42), Max: 98.5 (19 @ 21:01)  HR: 100 (19 @ 05:42) (100 - 121)  BP: 112/74 (19 @ 05:42) (107/67 - 124/72)  RR: 18 (19 @ 05:42) (18 - 23)  SpO2: 100% (19 @ 05:42) (96% - 100%)  Wt(kg): --  Weight (kg): 56.7 ( @ 11:34)  Telemetry: AFib, HR 90s-130s  General: comfortable, in NAD  Heart: +S1S2, irregularly irregular, 1/6 systolic murmur at the LLSB, no rubs, no gallops  Lungs: decreased breath sounds at the right base, no wheezes, no rhonchi, inspiratory crackles at the left base  Abdomen: soft, non-tender, non-distended, + bowel sounds  Extremities: no clubbing, no cyanosis, no edema  Neuro: A&O x3    LABS:  Serum Pro-Brain Natriuretic Peptide: >44544 pg/mL ( @ 12:38)    Troponin T, Serum: 0.02 ng/mL ( @ 23:44)  Troponin T, Serum: 0.03 ng/mL ( @ 12:38)        CBC:            13.0   5.5   >-----------< 239     @ 06:30            40.8               12.9   8.7   >-----------< 259     @ 12:38            40.1       CHEMISTRY:   137   |  98     |  60.0   ----------------------------< 149      @ 06:30    3.9   |  23.0   |  2.42   	  eGFR non-  18     eGFR   21      137   |  96     |  54.0   ----------------------------< 114      @ 12:38    3.5   |  25.0   |  2.54     eGFR non-  17     eGFR   19         TPro --    / Alb 3.9   / TBili 1.1   / DBili --    / AST 22    / ALT 8     / AlkPhos --      @ 12:38    PT/INR - ( 2019 12:38 )   PT: 15.0 sec;   INR: 1.29 ratio         PTT - ( 2019 12:38 )  PTT:27.9 sec    RADIOLOGY & ADDITIONAL TESTS:  CT Chest (19):  Small right pleural effusion. Trace left pleural effusion.  There is a peripheral infiltrate in the lateral segment of the right   middle lobe. Postoperative changes in the right lung.  Multiple bilateral renal hyperdense abnormalities, hyperdense cysts   versus solid lesions. These can be further evaluated with a CT scan of   the abdomen and pelvis with and without IV contrast when clinically   feasible.  No evidence of an intrathoracic cavity.      ASSESSMENT:  MARIA ANTONIA CALLOWAY is a 84y old female with a history of chronic combined systolic and diastolic heart failure (last EF 35-40%), hypertensive heart disease with CHF, CAD s/p IWMI with an occluded distal LCX, moderate to severe MR, mild to moderate AI, chronic AFib, CKD stage 4 (baseline creatinine ~2), mixed hyperlipidemia, PVCs, PVD s/p left iliac artery stent and COPD who presents with increasing SOB and cough and was noted to have acute kidney injury.    Please permit me to suggest the followin. I reviewed some of her Echo while it was being performed. Her EF looks like it is now <20% and she has severe MR. I wonder if she could have a tachycardia-induced cardiomyopathy or possibly myocarditis. I will switch her back to Lasix 40 mg IV 2x daily. We will have to watch her renal function closely. She might benefit from an inotrope (it would have to be milrinone secondary to AFib) however I would prefer to try and improve rate control with a higher dose of lopressor first. I will increase it to 100 mg 3x daily. No digoxin secondary to renal failure and I would avoid amiodarone since she is not on anticoagulation secondary to prior GI bleed requiring transfusion. If she cannot be adequately rate-controlled, she might benefit from AV candice ablation and Bi-V pacemaker. I will hold off on calling an EP consult until we see her response to the current medication changes. In the future, I would also consider trying her on Imdur at night (since she says it made her tired when taken during the day) and hydralazine.  2. The CT Chest is suggestive of pneumonia, which could explain the fatigue, decreased appetite, chills, SOB and cough. She was given IV steroids and is now on antibiotics. Consider Pulmonary consult.  3. Keep K>4.0, Mg>2.0. I will replace potassium since hypokalemia could be contributing to her rapid AFib.
PULMONARY PROGRESS NOTE      NOLAN CALLOWAYIDN-[id number]    Patient is a 84y old  Female who presents with a chief complaint of SOB (08 Jun 2019 09:38)      INTERVAL HPI/OVERNIGHT EVENTS: No significant change. Still SOB. No cp. No cough now.     MEDICATIONS  (STANDING):  aspirin  chewable 81 milliGRAM(s) Oral daily  azithromycin  IVPB      azithromycin  IVPB 500 milliGRAM(s) IV Intermittent every 24 hours  cefTRIAXone   IVPB 1 Gram(s) IV Intermittent every 24 hours  cefTRIAXone   IVPB      enoxaparin Injectable 30 milliGRAM(s) SubCutaneous every 24 hours  fenofibrate Tablet 145 milliGRAM(s) Oral daily  furosemide   Injectable 40 milliGRAM(s) IV Push two times a day  latanoprost 0.005% Ophthalmic Solution 1 Drop(s) Both EYES at bedtime  metoprolol tartrate 100 milliGRAM(s) Oral three times a day  pantoprazole    Tablet 40 milliGRAM(s) Oral before breakfast      MEDICATIONS  (PRN):  ALBUTerol/ipratropium for Nebulization 3 milliLiter(s) Nebulizer every 6 hours PRN Shortness of Breath and/or Wheezing      Allergies    No Known Allergies    Intolerances        PAST MEDICAL & SURGICAL HISTORY:  Breast cancer  GERD (gastroesophageal reflux disease)  Glaucoma  Chronic kidney disease  Lung mass  Afib  CAD (coronary artery disease)  CHF (congestive heart failure)  COPD (chronic obstructive pulmonary disease)  Lung mass: excision 2009  H/O mastectomy, right  H/O: hysterectomy             REVIEW OF SYSTEMS:    CONSTITUTIONAL:  No distress    HEENT:  Eyes:  No diplopia or blurred vision. ENT:  No earache, sore throat or runny nose.    CARDIOVASCULAR:  per HPI    RESPIRATORY:  per HPI     GASTROINTESTINAL:  No nausea, vomiting or diarrhea.    GENITOURINARY:  No dysuria, frequency or urgency.    NEUROLOGIC:  No paresthesias, fasciculations, seizures or weakness.    PSYCHIATRIC:  No disorder of thought or mood.    Vital Signs Last 24 Hrs  T(C): 36.6 (08 Jun 2019 06:34), Max: 36.6 (08 Jun 2019 06:34)  T(F): 97.8 (08 Jun 2019 06:34), Max: 97.8 (08 Jun 2019 06:34)  HR: 117 (08 Jun 2019 09:39) (102 - 117)  BP: 102/64 (08 Jun 2019 09:39) (94/65 - 102/68)  BP(mean): --  RR: 16 (08 Jun 2019 09:39) (16 - 18)  SpO2: 100% (08 Jun 2019 09:39) (100% - 100%)    PHYSICAL EXAMINATION:    GENERAL: The patient is awake and alert in no apparent distress.     HEENT: Head is normocephalic and atraumatic.  Mucous membranes are moist.    NECK: Supple.    LUNGS: Clear to auscultation without wheezing, rales or rhonchi; respirations unlabored    HEART: Regular rate and rhythm      ABDOMEN: Soft, nontender,     EXTREMITIES: Without any cyanosis, clubbing, rash, lesions      NEUROLOGIC: Grossly intact.    LABS:                        13.0   5.5   )-----------( 239      ( 07 Jun 2019 06:30 )             40.8     06-07    137  |  98  |  60.0<H>  ----------------------------<  149<H>  3.9   |  23.0  |  2.42<H>    Ca    9.3      07 Jun 2019 06:30    TPro  7.0  /  Alb  3.9  /  TBili  1.1  /  DBili  x   /  AST  22  /  ALT  8   /  AlkPhos  40  06-06    PT/INR - ( 06 Jun 2019 12:38 )   PT: 15.0 sec;   INR: 1.29 ratio         PTT - ( 06 Jun 2019 12:38 )  PTT:27.9 sec      CARDIAC MARKERS ( 06 Jun 2019 23:44 )  x     / 0.02 ng/mL / 93 U/L / x     / x      CARDIAC MARKERS ( 06 Jun 2019 12:38 )  x     / 0.03 ng/mL / x     / x     / x            Serum Pro-Brain Natriuretic Peptide: >72410 pg/mL (06-06-19 @ 12:38)       RADIOLOGY & ADDITIONAL STUDIES:
Called to bedside for Code Blue.  Pt initially had pulse after one round CPR but then became unresponsive again and lost pulse.  CPR performed/ACLS provided.  Pt emergently intubated, see intubation note and code sheet.  After multiple rounds of CPR and ACLS family were decided to make pt comfort care and resuscitation was stopped at which time pt had ROSC and was made comfort measure.
Cardiology Follow-up    MARIA ANTONIA CALLOWAY was seen and examined. She had 4 beats of NSVT overnight. She complains of significant fatigue and MORALES which is not significantly better although she is less SOB at rest. The patient denies any chest pain, palpitations, PND or abdominal pain.    PROBLEM LIST:    PAST MEDICAL HISTORY:  Breast cancer  GERD (gastroesophageal reflux disease)  Glaucoma  Chronic kidney disease  Lung mass  Afib  CAD (coronary artery disease)  CHF (congestive heart failure)  COPD (chronic obstructive pulmonary disease)    PAST SURGICAL HISTORY:  Lung mass  H/O mastectomy, right  H/O: hysterectomy    MEDICATIONS  (STANDING):  aspirin  chewable 81 milliGRAM(s) Oral daily  azithromycin  IVPB      azithromycin  IVPB 500 milliGRAM(s) IV Intermittent every 24 hours  cefTRIAXone   IVPB 1 Gram(s) IV Intermittent every 24 hours  cefTRIAXone   IVPB      enoxaparin Injectable 30 milliGRAM(s) SubCutaneous every 24 hours  fenofibrate Tablet 145 milliGRAM(s) Oral daily  furosemide   Injectable 40 milliGRAM(s) IV Push two times a day  latanoprost 0.005% Ophthalmic Solution 1 Drop(s) Both EYES at bedtime  metoprolol tartrate 100 milliGRAM(s) Oral three times a day  pantoprazole    Tablet 40 milliGRAM(s) Oral before breakfast    MEDICATIONS  (PRN):  ALBUTerol/ipratropium for Nebulization 3 milliLiter(s) Nebulizer every 6 hours PRN Shortness of Breath and/or Wheezing    ALLERGIES:  No Known Allergies    I&O's Summary    2019 07:01  -  2019 07:00  --------------------------------------------------------  IN: 660 mL / OUT: 500 mL / NET: 160 mL      PHYSICAL EXAM:  T(F): 97.8 (19 @ 06:34), Max: 97.8 (19 @ 06:34)  HR: 115 (19 @ 06:34) (102 - 115)  BP: 94/65 (19 @ 06:34) (94/65 - 102/68)  RR: 18 (19 @ 06:34) (18 - 18)  SpO2: 100% (19 @ 06:34) (100% - 100%)  Wt(kg): --  Weight (kg): 56.7 ( @ 11:34)  Telemetry: AFib, HR 80s-110s, up to 130s, 4 beats of NSVT  General: comfortable, in NAD  Heart: +S1S2, irregularly irregular, 1/6 systolic murmur at the LLSB, no murmurs, no rubs, no gallops  Lungs: decreased breath sounds at the right base, no wheezes, no rales, no rhonchi  Abdomen: soft, non-tender, non-distended, + bowel sounds  Extremities: no clubbing, no cyanosis, no edema, extremities feel cold  Neuro: A&O x3    LABS:    Troponin T, Serum: 0.02 ng/mL ( @ 23:44)  Troponin T, Serum: 0.03 ng/mL ( @ 12:38)        CBC:            13.0   5.5   >-----------< 239     @ 06:30            40.8               12.9   8.7   >-----------< 259     @ 12:38            40.1       CHEMISTRY:   137   |  98     |  60.0   ----------------------------< 149      @ 06:30    3.9   |  23.0   |  2.42     eGFR non-  18     eGFR   21      137   |  96     |  54.0   ----------------------------< 114      @ 12:38    3.5   |  25.0   |  2.54     eGFR non-  17     eGFR   19         TPro --    / Alb 3.9   / TBili 1.1   / DBili --    / AST 22    / ALT 8     / AlkPhos --      @ 12:38    PT/INR - ( 2019 12:38 )   PT: 15.0 sec;   INR: 1.29 ratio         PTT - ( 2019 12:38 )  PTT:27.9 sec    RADIOLOGY & ADDITIONAL TESTS:  Echo:   1. Left ventricular ejection fraction, by visual estimation, is <20%.   2. Severely decreased global left ventricular systolic function.   3. Spectral Doppler shows restrictive pattern of left ventricular   myocardial filling (Grade III diastolic dysfunction).   4. Severely reduced RV systolic function.   5. There is no evidence of pericardial effusion.   6. Mild mitral annular calcification.   7. The mitral valve leaflets are tethered which is due to reduced   systolic function and elevated LVDP.   8. Moderate-severe tricuspid regurgitation.   9. Mild aortic regurgitation.  10. Estimated pulmonary artery systolic pressure is 37.2 mmHg assuming a   right atrial pressure of 8 mmHg, which is consistent with borderline   pulmonary hypertension.  11. Endocardial visualization was enhanced with intravenous echo contrast.      ASSESSMENT:  MARIA ANTONIA CALLOWAY is a 84y old female with a history of chronic combined systolic and diastolic heart failure (last EF 35-40%), hypertensive heart disease with CHF, CAD s/p IWMI with an occluded distal LCX, moderate to severe MR, mild to moderate AI, chronic AFib, CKD stage 4 (baseline creatinine ~2), mixed hyperlipidemia, PVCs, PVD s/p left iliac artery stent and COPD who presents with increasing SOB and cough and was noted to have acute kidney injury.    Please permit me to suggest the followin. Her EF looks like it is now <20% and she has at least moderate to severe MR. I spoke with Dr. June who says that her AFib was recently rate-controlled so I doubt it is a tachycardia-induced cardiomyopathy. It could theoretically be myocarditis. The patient appears to have "cold and dry" CHF. She is not perfusing her extremities well and she is not fluid overloaded. This suggests a poor prognosis. I think she would benefit from a swan and inotropes however the patient is a DNR and she and her family have previously expressed to Dr. June, who will be rounding on the patient tomorrow, that they did not want to be aggressive. I will continue Lasix 40 mg IV 2x daily for now and see what happens to her renal function, which has not been drawn yet and will likely be worse if I am correct.   2. Continue lopressor 100 mg 3x daily for rate control of AFib. I would prefer to avoid Cardizem. No digoxin secondary to renal failure and I would avoid amiodarone since she is not on anticoagulation secondary to prior GI bleed requiring transfusion. If she cannot be adequately rate-controlled, she might benefit from AV candice ablation and Bi-V pacemaker if the patient wants to be aggressive. I will hold off on calling an EP consult until we see her response to the current medication changes. In the future, I would also consider trying her on Imdur at night (since she says it made her tired when taken during the day) and hydralazine.  3. Continue antibiotics for possible pneumonia.   4. Keep K>4.0, Mg>2.0.   5. Renal consult is recommended.
Patient: MARIA ANTONIA CALLOWAY [id number] 84y Female                           Internal Medicine Hospitalist Progress Note    Initial HPI:  84y Female PMH h/o COPD, CHF, CAD, CKD Stage IV, Afib, Lung cancer, ? L lung mass s/p bronchoscopy 2019, presents c/o worsening SOB over past 5 days.  No orthopnea.  No cough / fever/ chills.  No chest pain / palpitations.  BNP > 70k, admitted for acute CHF exacerbation.  TTE performed showing diminished EF per cardiology.  CT Chest showed peripheral infiltrate RML.     Interval History:  She reports some improvement in symptoms today.  However, now with nonproductive cough.  NO fever / chills.  No chest pain / palpitations. No additional complaints.     ____________________PHYSICAL EXAM:  Vitals reviewed as indicated below  GENERAL:  NAD Alert and Oriented x 3   HEENT: NCAT  CARDIOVASCULAR:  S1, S2  LUNGS: coarse BS b/l  ABDOMEN:  soft, (-) tenderness, (-) distension, (+) bowel sounds, (-) guarding, (-) rebound (-) rigidity  EXTREMITIES:  no cyanosis / clubbing / edema.   ____________________      BACKGROUND:  HEALTH ISSUES - PROBLEM Dx:        Allergies    No Known Allergies    Intolerances      PAST MEDICAL & SURGICAL HISTORY:  Breast cancer  GERD (gastroesophageal reflux disease)  Glaucoma  Chronic kidney disease  Lung mass  Afib  CAD (coronary artery disease)  CHF (congestive heart failure)  COPD (chronic obstructive pulmonary disease)  Lung mass: excision   H/O mastectomy, right  H/O: hysterectomy        VITALS:  Vital Signs Last 24 Hrs  T(C): 36.4 (2019 05:42), Max: 36.9 (2019 21:01)  T(F): 97.6 (2019 05:42), Max: 98.5 (2019 21:01)  HR: 102 (2019 13:14) (100 - 121)  BP: 96/62 (2019 13:14) (96/62 - 124/72)  BP(mean): --  RR: 18 (2019 05:42) (18 - 23)  SpO2: 100% (2019 05:42) (96% - 100%) Daily     Daily Weight in k (2019 05:38)  CAPILLARY BLOOD GLUCOSE        I&O's Summary    2019 07:01  -  2019 07:00  --------------------------------------------------------  IN: 120 mL / OUT: 0 mL / NET: 120 mL          LABS:                        13.0   5.5   )-----------( 239      ( 2019 06:30 )             40.8         137  |  98  |  60.0<H>  ----------------------------<  149<H>  3.9   |  23.0  |  2.42<H>    Ca    9.3      2019 06:30    TPro  7.0  /  Alb  3.9  /  TBili  1.1  /  DBili  x   /  AST  22  /  ALT  8   /  AlkPhos  40  0606    PT/INR - ( 2019 12:38 )   PT: 15.0 sec;   INR: 1.29 ratio         PTT - ( 2019 12:38 )  PTT:27.9 sec  LIVER FUNCTIONS - ( 2019 12:38 )  Alb: 3.9 g/dL / Pro: 7.0 g/dL / ALK PHOS: 40 U/L / ALT: 8 U/L / AST: 22 U/L / GGT: x             CARDIAC MARKERS ( 2019 23:44 )  x     / 0.02 ng/mL / 93 U/L / x     / x      CARDIAC MARKERS ( 2019 12:38 )  x     / 0.03 ng/mL / x     / x     / x              MEDICATIONS:  MEDICATIONS  (STANDING):  aspirin  chewable 81 milliGRAM(s) Oral daily  azithromycin  IVPB      cefTRIAXone   IVPB      enoxaparin Injectable 30 milliGRAM(s) SubCutaneous every 24 hours  fenofibrate Tablet 145 milliGRAM(s) Oral daily  furosemide   Injectable 40 milliGRAM(s) IV Push two times a day  latanoprost 0.005% Ophthalmic Solution 1 Drop(s) Both EYES at bedtime  metoprolol tartrate 100 milliGRAM(s) Oral three times a day  pantoprazole    Tablet 40 milliGRAM(s) Oral before breakfast    MEDICATIONS  (PRN):  ALBUTerol/ipratropium for Nebulization 3 milliLiter(s) Nebulizer every 6 hours PRN Shortness of Breath and/or Wheezing
Patient: MARIA ANTONIA CALLOWAY [id number] 84y Female                           Internal Medicine Hospitalist Progress Note    Initial HPI:  84y Female PMH h/o COPD, CHF, CAD, CKD Stage IV, Afib, Lung cancer, ? L lung mass s/p bronchoscopy 2019, presents c/o worsening SOB over past 5 days.  No orthopnea.  No cough / fever/ chills.  No chest pain / palpitations.  BNP > 70k, admitted for acute CHF exacerbation.  TTE performed showing EF < 20%.  CT Chest showed peripheral infiltrate RML.     Interval History:  Today, patient was seen as part of rapid response.  RN was at bedside, states pt became unresponsive after using bedside commode, subsequently became apneic and pulseless.  Code blue was called and ACLS was initiated.  During code blue, daughter Sushil Thornton was contacted at [phone] - stated patient with known CKD, COPD and progerssively worsening CHF now found to have EF < 20%.  States her mother would never have wanted to be intubated under these circumstances.  Pt found to have a faint pulse with BP 50/P.  Placed on comfort care measures per family wishes.    ____________________PHYSICAL EXAM:  BP 50/P  GENERAL:  unresponsive.  Agonal respirations.   CARDIOVASCULAR:  S1, S2  LUNGS: coarse BS b/l  ____________________  VITALS:  Vital Signs Last 24 Hrs  T(C): 36.6 (2019 06:34), Max: 36.6 (2019 06:34)  T(F): 97.8 (2019 06:34), Max: 97.8 (2019 06:34)  HR: 117 (2019 09:39) (102 - 117)  BP: 102/64 (2019 09:39) (94/65 - 102/68)  BP(mean): --  RR: 16 (2019 09:39) (16 - 18)  SpO2: 100% (2019 09:39) (100% - 100%) Daily     Daily Weight in k.5 (2019 05:44)  CAPILLARY BLOOD GLUCOSE      POCT Blood Glucose.: 248 mg/dL (2019 11:03)    I&O's Summary    2019 07:01  -  2019 07:00  --------------------------------------------------------  IN: 660 mL / OUT: 500 mL / NET: 160 mL        LABS:                        13.3   9.9   )-----------( 275      ( 2019 10:46 )             41.6     06    133<L>  |  94<L>  |  63.0<H>  ----------------------------<  194<H>  3.8   |  23.0  |  2.41<H>    Ca    9.3      2019 10:46  Mg     2.5         TPro  7.0  /  Alb  3.9  /  TBili  1.1  /  DBili  x   /  AST  22  /  ALT  8   /  AlkPhos  40      PT/INR - ( 2019 12:38 )   PT: 15.0 sec;   INR: 1.29 ratio         PTT - ( 2019 12:38 )  PTT:27.9 sec  LIVER FUNCTIONS - ( 2019 12:38 )  Alb: 3.9 g/dL / Pro: 7.0 g/dL / ALK PHOS: 40 U/L / ALT: 8 U/L / AST: 22 U/L / GGT: x             CARDIAC MARKERS ( 2019 23:44 )  x     / 0.02 ng/mL / 93 U/L / x     / x      CARDIAC MARKERS ( 2019 12:38 )  x     / 0.03 ng/mL / x     / x     / x              MEDICATIONS:  ALBUTerol/ipratropium for Nebulization 3 milliLiter(s) Nebulizer every 6 hours PRN  aspirin  chewable 81 milliGRAM(s) Oral daily  azithromycin  IVPB      azithromycin  IVPB 500 milliGRAM(s) IV Intermittent every 24 hours  cefTRIAXone   IVPB 1 Gram(s) IV Intermittent every 24 hours  cefTRIAXone   IVPB      enoxaparin Injectable 30 milliGRAM(s) SubCutaneous every 24 hours  fenofibrate Tablet 145 milliGRAM(s) Oral daily  furosemide   Injectable 40 milliGRAM(s) IV Push two times a day  latanoprost 0.005% Ophthalmic Solution 1 Drop(s) Both EYES at bedtime  LORazepam   Injectable 1 milliGRAM(s) IV Push once  metoprolol tartrate 100 milliGRAM(s) Oral three times a day  morphine  - Injectable 4 milliGRAM(s) IV Push once  morphine  - Injectable 4 milliGRAM(s) IV Push once  morphine  - Injectable 4 milliGRAM(s) IV Push every 2 hours PRN  pantoprazole    Tablet 40 milliGRAM(s) Oral before breakfast

## 2019-06-08 NOTE — CHART NOTE - NSCHARTNOTEFT_GEN_A_CORE
Rapid response called by cardiac NP for 84y Female PMH h/o COPD, CHF, CAD, CKD Stage IV, Afib, Lung cancer, ? L lung mass s/p bronchoscopy 1/2019 admitted with PNA for respiratory distress and then unresponsiveness. On arrival, Cardiac NP seen at bedside and running code. As per NP, pt became unresponsive and noted not to have pulse. Critical care team (PA and attending) arrived at bedside and continued running the code. Compressions continued and pt give epinephrine with IV access obtained. Daughter who is HCP called by PA who says pt did not want mother resuscitated. Resucuscitation effort cancelled. Attending Dr. Preston at [facility].

## 2019-06-08 NOTE — AIRWAY PLACEMENT NOTE ADULT - POST AIRWAY PLACEMENT ASSESSMENT:
chest excursion noted/breath sounds equal/positive end tidal CO2 noted/skin color improved/breath sounds bilateral/CXR pending

## 2019-06-08 NOTE — CHART NOTE - NSCHARTNOTEFT_GEN_A_CORE
Patient with multiple medical problems admitted for CHF exacerbation, was getting diuresis, developed respiratory distress prompting RRT call and subsequently lost pulse resulting in a code blue call.    ON cardiac monitor significant ST elevations were found.  Prior to cardiac arrest EKG was being hooked up but unable to be performed given loss of pulse.    PAUL device was utilized for chest compressions.  Patient was intubated on first attempt - see note.  I attempted CVC to R femoral, unable to pass wire.  epi x 2; CPR through PAUL.    Dr Preston, the attending, was present at the arrest and discussed the care plan with daughter -- see his documentation -- per Dr Preston she didn't want to be on a ventilator ('she'll never get off') and wanted the aggressive maneuvers to cease.  Patient then had a pulse, with a blood pressure of 50/palp, and worse ST elevations on monitor.      Given revised goals of care, decision was made to terminally extubate after administration of morphine and promote comfort.    Dr Preston will continue to manage    Critical care time excluding procedure 35 minutes.

## 2019-06-08 NOTE — RESPIRATORY CARE EMERGENCY NOTE - CAC RESPIRATORY COMMENTS
Respiratory called for Bipap. At arrival, the Nurse and the NP were in the room. Rapid response called, then Code Blue. Pt intubated by MICU PA with an 8.0 ETT secured at 22 lip line. Intubation confirmed with positive Co2 detector, B/L BS and P/L chest rise. Patient ambued with 100% O2 with no resistance. Pt extubated at 11:25am as ordered. Pt placed NC 4 lpm With no apparent distress.

## 2019-06-11 ENCOUNTER — APPOINTMENT (OUTPATIENT)
Dept: GASTROENTEROLOGY | Facility: CLINIC | Age: 84
End: 2019-06-11

## 2019-06-20 ENCOUNTER — APPOINTMENT (OUTPATIENT)
Dept: PULMONOLOGY | Facility: CLINIC | Age: 84
End: 2019-06-20

## 2019-08-14 NOTE — ED ADULT TRIAGE NOTE - WEIGHT IN KG
Maria Antonia Faustin is seen in pulmonary consultation August 14, 2019 with regard to a pulmonary mass having been followed for a long time in our office for COPD.  Maria Antonia Faustin is a 61-year-old woman with moderate underlying COPD who recently was being evaluated for microscopic hematuria. A CT urogram was performed that showed a 7.6 mm pulmonary nodule in the right lower lobe abutting the posteromedial pleural surface. She underwent a cystoscopy which showed a bladder tumor and she is planning on having a cystoscopy under anesthesia for removal of this tumor. A CT scan of the chest was performed which again showed the above-mentioned pulmonary nodule but no other significant findings. She is now referred for evaluation.  Over the last year she did increase her tobacco use and she had more coughing is but in the last month she has cut back on smoking, quitting yesterday and her cough and sputum have decreased. She once again just gets short of breath walking hills.    Past medical history was again reviewed with her and is essentially unchanged with the exception of this evaluation of microscopic hematuria.    Social history: As mentioned above, her smoking has increased up to 1 pack a day over the last year but in the last month she tapered down and quit yesterday.    Review of systems: She has been having right hip abductor pains and a labral tear. This happened in May and it has decreased her overall exercise.    On physical examination she was pleasant and alert in no distress.  Blood pressure 124/68, pulse 107, respiratory rate of 16. She stood 5 foot 4 inches tall and weight 83.2 kg, up 3.8 kg from when I last saw her. Oxygen saturation is 94% on room air rest and 91% with walking.  Chest was normal to palpation and percussion. There was no use of accessory respiratory muscles and chest expansion was equal. Lungs were clear to auscultation. Quite wheezes were heard with forced expiration. Cardiac exam had  a regular rate and rhythm without murmur. Abdomen was soft nontender without hepatosplenomegaly. Bowel sounds were normal. Extremities had no edema or clubbing. Skin was warm without rashes.    I reviewed her CT urogram and CT scan of the chest with her personally, showing her images and also reviewed the images from May 2013 CT urogram. The above-mentioned right-sided lesion was not seen in 2013 although that scan did not get up to the level in the chest at the current lesion is seen at.    Impression  1. Pulmonary nodule. It cannot be determined whether this is new or old based on previous scans. I went over the possibilities of primary bronchogenic carcinoma versus metastatic carcinoma (recognizing that bladder tumors are not commonly aggressively metastatic to the lungs) versus this just being a scar or sequela I will from previous bronchitis/pneumonia. I discussed inability to reliably biopsy this because of its size and risks of removal versus just watchful waiting. I recommended a repeat CT scan in 3 months. If the lesion is decreasing in size or resolve no further follow-up would be needed. If it is the same size, I would just follow it out to a total of 2 years to make sure that it doesn't change. Obviously if it is growing then we will consider more aggressive evaluation.  2. COPD. She'll continue her Dulera. She is not needing her albuterol.  3. Upcoming bladder surgery. From a rest. Standpoint she should be at minimal increased risk for the proposed surgery and anesthesia. I did review her pulmonary function tests from 2016 that just showed a mild to moderate obstructive impairment. Her respiratory disease is stable at this point.  4. Nocturnal hypoxemia. She'll continue 4 L of oxygen at night.  I'll see her back in 3 months after her repeat CT scan.     56.7

## 2019-09-16 NOTE — ED PROVIDER NOTE - CPE EDP EYES NORM
September 16, 2019         Patient: Roman Magana   YOB: 1988   Date of Visit: 9/16/2019           To Whom it May Concern:    Roman Magana was seen in my clinic on 9/16/2019 for acute fracture of malloelus. He may return to work on light duty as tolerated.    If you have any questions or concerns, please don't hesitate to call.        Sincerely,           Nathalie Thornton P.A.-C.  Electronically Signed      normal...

## 2020-01-21 NOTE — ASU DISCHARGE PLAN (ADULT/PEDIATRIC). - DIET
no change Consent (Marginal Mandibular)/Introductory Paragraph: The rationale for Mohs was explained to the patient and consent was obtained. The risks, benefits and alternatives to therapy were discussed in detail. Specifically, the risks of damage to the marginal mandibular branch of the facial nerve, infection, scarring, bleeding, prolonged wound healing, incomplete removal, allergy to anesthesia, and recurrence were addressed. Prior to the procedure, the treatment site was clearly identified and confirmed by the patient. All components of Universal Protocol/PAUSE Rule completed.

## 2020-05-04 NOTE — DISCHARGE NOTE ADULT - BECAUSE OF A PHYSICAL, MENTAL OR EMOTIONAL CONDITION, DO YOU HAVE DIFFICULTY DOING  ERRANDS ALONE LIKE VISITING A DOCTOR'S OFFICE OR SHOPPING (15 YEARS AND OLDER)
Date of Service:  5/4/2020    Chief Complaint:  Elevated prostate specific antigen   Hypogonadism  Erectile dysfunction     History of Present Illness:  The patient presents today 3 months since I last saw him, his medical records were reviewed and in summary Grabiel Neal is a 62-year old male who has a history of an elevated prostate specific antigen up to 3.41 from 2-13-19, a repeat value on 9-4-19 was 3.82. he also had complaints of erectile dysfunction, he was found to have a low testosterone at 185.0, he was started on testosterone replacement therapy with 300 mg IM injections every 3 weeks, his value harman up to 318.7. He was overall pleased on replacement therapy,  However his primary overall pleased on replacement therapy, however his primary care physician recommended stopping therapy secondary to a TIA until an evaluation by neurology. He has done well and was given clearance by neurologist to be restarted on testosterone replacement therapy and started Androgel and testosterone injection. He presents today with a recent free testosterone of 41.6, total testosterone of 295.3 and prostate specific antigen of 3.48 from 4-. He reports did not start Androgel. His last testosterone injection was given on 1-. He reports having a good urinary stream and is emptying his bladder well. Intermittently he experiences urgency however, this is not bothersome to him. He denies any fevers, chills or dysuria. He denies any gross hematuria or recent infections.     PSA Test Results:  Lab Results   Component Value Date    PSA 3.48 04/28/2020    PSA 3.82 09/04/2019    PSA 3.41 02/13/2019       Past Medical History:   Past Medical History:   Diagnosis Date   • Arthritis    • Colon polyps    • Elevated PSA    • Essential (primary) hypertension    • High cholesterol    • Sleep apnea     c pap   • TIA (transient ischemic attack)        Surgical History:  Past Surgical History:   Procedure Laterality Date   •  Anesth,knee arthroscopy Bilateral    • Colonoscopy     • Colonoscopy  03/19/2019   :    Family History:  Family history of G.U. (Genitourinary) Malignancy - no  Family History   Problem Relation Age of Onset   • Stroke Mother    • Heart disease Father        Social History:  Social History     Socioeconomic History   • Marital status: Single     Spouse name: Not on file   • Number of children: Not on file   • Years of education: Not on file   • Highest education level: Not on file   Occupational History   • Not on file   Social Needs   • Financial resource strain: Not on file   • Food insecurity:     Worry: Not on file     Inability: Not on file   • Transportation needs:     Medical: Not on file     Non-medical: Not on file   Tobacco Use   • Smoking status: Never Smoker   • Smokeless tobacco: Never Used   Substance and Sexual Activity   • Alcohol use: Yes     Comment: rarely   • Drug use: No   • Sexual activity: Yes     Partners: Female   Lifestyle   • Physical activity:     Days per week: Not on file     Minutes per session: Not on file   • Stress: Not on file   Relationships   • Social connections:     Talks on phone: Not on file     Gets together: Not on file     Attends [employer] service: Not on file     Active member of club or organization: Not on file     Attends meetings of clubs or organizations: Not on file     Relationship status: Not on file   • Intimate partner violence:     Fear of current or ex partner: Not on file     Emotionally abused: Not on file     Physically abused: Not on file     Forced sexual activity: Not on file   Other Topics Concern   • Not on file   Social History Narrative   • Not on file       Allergies:  ALLERGIES:  No Known Allergies    Medications:  Current Outpatient Medications   Medication Sig Dispense Refill   • lisinopril (ZESTRIL) 20 MG tablet TAKE 1 TABLET BY MOUTH  DAILY 90 tablet 1   • metoPROLOL succinate (TOPROL-XL) 100 MG 24 hr tablet TAKE 1 TABLET BY MOUTH  DAILY 90  tablet 1   • aspirin 81 MG tablet Take 81 mg by mouth daily.     • atorvastatin (LIPITOR) 10 MG tablet Take 1 tablet by mouth daily. 90 tablet 3   • cyclobenzaprine (FLEXERIL) 10 MG tablet Take 1 tablet by mouth 3 times daily as needed for Muscle spasms. 30 tablet 0     No current facility-administered medications for this visit.        Allergies, Medications, Past Medical History, Past Surgical History, Family History, and Social History were reviewed today.    Review of Systems:    Denies fever and chills.  Complains of hypogonadism, elevated prostate specific antigen, erectile dysfunction.  All other systems have been reviewed and are negative.    Physical Exam:    Constitutional:  Visit Vitals  /88   Pulse 79   Resp 16   Ht 6' 4\" (1.93 m)   Wt (!) 191.8 kg   BMI 51.46 kg/m²     Well developed, well nourished, and afebrile.    Psychiatric:  Alert and oriented x3.  Normal mood and affect.    Skin:  Warm and dry with no lesions or induration.    Genitourinary:  Not performed       Assessment and Plan:  Therefore, we discussed where to go from here.    For his elevated prostate specific antigen- His prostate specific antigen level is low and stable. I will continue to monitor his level.     For his hypogonadism and erectile dysfunction- His recent testosterone level is low however, he did not start Androgel. A prescription for Androgel was sent to the compounding pharmacy. He was given a 300 mg testosterone injection at this time without difficulty. He will need a repeat free and total testosterone level in 2 months and follow up with Dr. Amezcua for further evaluation, as he will likely be a complicated androgen replacement patient, given his history of a recent TIA.        On 5/4/2020, INatacha scribed the services personally performed by Randall Dallas MD    The documentation recorded by the scribe accurately and completely reflects the service(s) I personally performed and the decisions made by  me.        Yes

## 2020-08-31 NOTE — ED ADULT NURSE NOTE - NS ED NURSE LEVEL OF CONSCIOUSNESS ORIENTATION
[facility],   Phone: (   )    -  Fax: (   )    -  Scheduled Appointment: 09/21/2020 Bobo Barrientos)  Cardiology; Internal Medicine  [address], [address]  [place] [address]  Phone: [phone]  Fax: [phone]  Follow Up Time:     Francine Velasquez)  Neurology  [address] [place] [address]  Phone: [phone]  Fax: [phone]  Follow Up Time: Oriented - self; Oriented - place; Oriented - time Bobo Barrientos)  Cardiology; Internal Medicine  [address] [facility] [facility], [address]  [place] [address]  Phone: [phone]  Fax: [phone]  Follow Up Time:     Francine Velasquez)  Neurology  [address] [place] [address]  Phone: [phone]  Fax: [phone]  Follow Up Time:     PCP,   Phone: (   )    -  Fax: (   )    -  Follow Up Time: 1-3 days

## 2021-03-01 NOTE — PATIENT PROFILE ADULT. - [facility]
End of Shift Note    Bedside shift change report given to Chasity (oncoming nurse) by Ermelinda Canales (offgoing nurse). Report included the following information SBAR, Kardex, Intake/Output and MAR    Shift worked:  Sunday 7p-7a   Shift summary and any significant changes:       Slept ok, did wound care (Was quite painful), am labs drawn, received pain meds, IV ABX, wants to talk with CM about disability    Concerns for physician to address:       Zone phone for oncoming shift:          Activity:  Activity Level: Up with Assistance  Number times ambulated in hallways past shift: 0  Number of times OOB to chair past shift: 0    Cardiac:   Cardiac Monitoring:    no       Access:   Current line(s): PIV      Genitourinary:   Urinary status: voiding, urinal    Respiratory:   O2 Device: Room air  Chronic home O2 use?: no  Incentive spirometer at bedside: no     GI:     Current diet:  DIET DIABETIC CONSISTENT CARB Regular  DIET NUTRITIONAL SUPPLEMENTS Breakfast, Dinner; Glucerna Shake  Passing flatus: yes  Tolerating current diet: yes       Pain Management:   Patient states pain is manageable on current regimen: no    Skin:  Wesley Score: 20  Interventions: repositioning, wound care, elevate heels     Patient Safety:  Fall Score:  Total Score: 2  Interventions: call bell, bed low, bed locked  High Fall Risk: Yes    Length of Stay:  Expected LOS: 3d 4h  Actual LOS: [address] [facility] (4) no impairment

## 2021-12-07 NOTE — ASU DISCHARGE PLAN (ADULT/PEDIATRIC). - PATIENT BELONGING
n/a  Chart reviewed. Medical Diagnosis: Neutropenic fever ([facility] [D70.9, R50.81]   Treatment Diagnosis: oropharyngeal dysphagia    BSE Impression 12/5/21  Pt with significant coating on lips and greater on tongue. Pt able to state first name and year only with orientation questions. Voice is dysphonic with pt following some basic commands (stick out tongue, smile). Oral care given with suction swab prior to and after trials of ice. (No tongue deviation or labial droop. Able to stick out tongue. Weak cough). Pt with oral acceptance of ice chips; cues needed for lip closure around spoon. Some oral movement with ice chips with no labial spillage. Did not attempt other consistencies d/t absent swallow response to ice chips. Pt with no swallow response to ice chips. O2 sats declined noted with trials. One spontaneous swallow seen when doing oral care with suction swab. Unable to swallow on command. Some spontaneous coughing episodes noted. Recommend NPO with oral care with suction swab at least 3 times a day. Will monitor need/appropriateness for speech evaluation. Dysphagia Diagnosis: Suspected needs further assessment, Moderate to severe oral stage dysphagia    MBS results - not indicated at this time    Pain: none reported    Current Diet : Diet NPO  ADULT TUBE FEEDING; Nasogastric; Standard with Fiber; Continuous; 20; Yes; 25; Q 4 hours; 50; 300; Q 4 hours      Treatment:  Pt seen bedside to address the following goals:  Goal 1: Pt will participate in repeat bedside assessment  12/6: RN states okay to re-assess pt. Pt alert, oriented to person and that this is a hospital. Oral cavity very dry with tongue extremely dry. RN reports she has been completing oral care with suction toothbrush and will cont throughout the day. Vocal quality weak initially, however improved with hydration. Pt analyzed with ice chips, water via tsp. Pt exhibited no cough/throat clear associated with these trials.   Minimal swallow movement was felt upon palpation of anterior neck. Pt was not able to demonstrate labial seal around cup or straw for assessment. Recommend aggressive oral care, minimum 2-3 x /day, with ice chips/tsps of water for comfort/hydration. Plan for re-assessment next session  Cont goal  12/7: Cleared by RN to see pt. Received pt awake, alert, pleasant, resting in bed, on 4L O2 via nc, NG tube in place. With pt seated up in bed, and following completion of oral hygiene with suction kit, assessed tolerance ice chips and thins via tsp. Pt with positive oral acceptance, anterior loss of bolus x1, slowed oral prep, limited laryngeal swallow movement (however very reduced/weak/incomplete), no cough or throat clear. Provided cues throughout for effortful swallow. Despite overt audible signs of aspiration, suspect pt remains at a very high risk given minimal swallow movement. Recommend continue aggressive oral care, ice chips/tsp water for comfort/hydration. Goal 2: Pt/family will verbalize and/or demonstrate understanding of swallowing recommendations  12/6: pt educated to purpose of re-assessment, reviewed s/s of aspiration and concern if aspiration occurs, importance of oral care and recommendation for ice chips, tsps of water with plan to re-assess next session  Cont goal  12/7: Provided education to the patient regarding purpose of visit, swallow function, dysphagia/aspiration concern, oral hygiene completion/rationale, effortful swallow, plan for eventual swallow study. Pt indicated some comprehension, suspect needs reinforcement. Cont goal    Patient/Family/Caregiver Education:  As above    Compensatory Strategies:  90 degrees when taking ice chips, water     Plan:  Continued daily Dysphagia treatment with goals per  plan of care.   · Diet recommendations: cont NPO with tube feeds  · Aggressive oral care with small amounts of ice chips /tsps of water, for the comfort of pt, health and hydration of oropharyngeal mucosa and swallow stimulation  · Re-assessment 12/8, as pt appropriate  DC recommendation: TBD  Treatment: 21  D/W nursing  Needs met prior to leaving room, call button in reach. [place], [place], [address] [place] [address], .[phone]  Pg.  # [phone]  If patient is discharged prior to next treatment, this note will serve as the discharge summary patient's belongings returned

## 2022-04-12 NOTE — H&P ADULT - RESPIRATORY
April 12, 2022         Patient: Alice Solano   YOB: 1992   Date of Visit: 4/12/2022           To Whom it May Concern:    Alice Solano was seen in my clinic on 4/12/2022. She may return to work on 4/14 due to an acute illness.    If you have any questions or concerns, please don't hesitate to call.        Sincerely,           HARSH Burt.  Electronically Signed      detailed exam

## 2022-05-25 NOTE — ASU DISCHARGE PLAN (ADULT/PEDIATRIC). - PROCEDURE
none canceled pt did not need procedure details… Soft, non-tender, no hepatosplenomegaly, normal bowel sounds

## 2022-07-13 NOTE — CHART NOTE - NSCHARTNOTEFT_GEN_A_CORE
Called by RN for dyspnea after patient was transferred from commode to bed.   Patient is with history of PNA and worsening heart failure and new ejection fraction of 20%  Patient became unresponsive and code blue called - please see code documentation, MICU attending note, rapid response note  patient is now comfort measures Visited with patient as no PCP listed in chart. Demographics on face sheet verified. Patient states no PCP. Pt interested in a [facility] PCP and a list has been provided to them. With pt's consent, I will also send a referral in [facility] to [facility] to aid in finding pt a new PCP. No further needs at this time.

## 2023-02-04 NOTE — H&P ADULT - NSHPPHYSICALEXAM_GEN_ALL_CORE
Acute cystitis without hematuria   Dehydration   Functional urinary incontinence  Suspect etiology prerenal 2/2 dehydration and UTI.  Cr 2.1>>1.6.  Baseline Cr ~1.1  - given 250 mL LR fluid bolus, gentle hydration given CHF   - No signs or symptoms of uremia.   - Complaining of dysuria.  - UA infectious with 49 WBC, rare bacteria and 1 squam   - started on broad spectrum ceftriaxone   - Urine cultures with ESBL   - ertapenem started 2/2/23 per ID   - ID consulted and continues to follow pt; appreciate ID recs  - hold home losartan, restart as able   - Monitor UOP and follow serial BMP    - Monitor electrolytes, phos levels daily   PHYSICAL EXAMINATION:  GENERAL: NAD, Alert and Oriented x 3 HEENT:  Normocephalic and atraumatic.  Extraocular muscles are intact.  NECK: Supple.  - JVD.  CARDIOVASCULAR: RRR S1, S2.  LUNGS: coarse BS b/l - wheezing, - rhonchi.  BACK: - CVA tenderness.  ABDOMEN: Soft, - tenderness, - distension, + BS.  EXTREMITIES: - cyanosis, - clubbing, - edema.  NEUROLOGIC: strength is symmetric, sensation intact, speech fluent.  PSYCHIATRIC: Calm.  - agitation.    SKIN: - rashes, - lesions.

## 2023-07-11 NOTE — ED ADULT NURSE NOTE - CAS EDP DISCH DISPOSITION ADMI
Health Maintenance Due   Topic Date Due   • Hepatitis B Vaccine (1 of 3 - 3-dose series) Never done       Patient is due for topics as listed above but is not proceeding with Immunization(s) Hep B at this time.      Telemetry

## 2024-04-16 NOTE — PATIENT PROFILE ADULT - FALL HARM RISK
[No Acute Distress] : no acute distress [Well Nourished] : well nourished [Well Developed] : well developed [Normal Sclera/Conjunctiva] : normal sclera/conjunctiva [Normal Outer Ear/Nose] : the outer ears and nose were normal in appearance [No Respiratory Distress] : no respiratory distress  [No Accessory Muscle Use] : no accessory muscle use [Clear to Auscultation] : lungs were clear to auscultation bilaterally [Normal Rate] : normal [Rhythm Regular] : regular [Normal S1] : normal S1 [Normal S2] : normal S2 [Distant] : the heart sounds were distant [No Carotid Bruits] : no carotid bruits [No Abdominal Bruit] : a ~M bruit was not heard ~T in the abdomen [No Varicosities] : no varicosities [Pedal Pulses Present] : the pedal pulses are present [No Edema] : there was no peripheral edema [No Palpable Aorta] : no palpable aorta [No Extremity Clubbing/Cyanosis] : no extremity clubbing/cyanosis [Soft] : abdomen soft [Non Tender] : non-tender [Non-distended] : non-distended [No Masses] : no abdominal mass palpated [No HSM] : no HSM [Normal Bowel Sounds] : normal bowel sounds [Normal] : soft, non-tender, non-distended, no masses palpated, no HSM and normal bowel sounds [Normal Anterior Cervical Nodes] : no anterior cervical lymphadenopathy [Kyphosis] : kyphosis [No Joint Swelling] : no joint swelling [No Rash] : no rash [Coordination Grossly Intact] : coordination grossly intact [No Focal Deficits] : no focal deficits [Normal Affect] : the affect was normal [Normal Insight/Judgement] : insight and judgment were intact age(85 years old or older)/other/coagulation(Bleeding disorder R/T clinical cond/anti-coags)

## 2024-06-17 NOTE — ED PROVIDER NOTE - ENMT NEGATIVE STATEMENT, MLM
I have personally provided the amount of critical care time documented below concurrently with the resident/fellow.  This time excludes time spent on separate procedures and time spent teaching. I have reviewed the resident’s / fellow’s documentation and I agree with the history, exam, and assessment and plan of care. Ears: no ear pain and no hearing problems.Nose: no nasal congestion and no nasal drainage.Mouth/Throat: no dysphagia, no hoarseness and no throat pain.Neck: no lumps, no pain, no stiffness and no swollen glands.

## 2024-09-25 NOTE — ED ADULT NURSE NOTE - NSFALLRSKOUTCOME_ED_ALL_ED
Nurses Note -- 4 Eyes      9/25/2024   1:05 AM      Skin assessed during: Admit      [] No Altered Skin Integrity Present    []Prevention Measures Documented      [x] Yes- Altered Skin Integrity Present or Discovered   [] LDA Added if Not in Epic (Describe Wound)   [] New Altered Skin Integrity was Present on Admit and Documented in LDA   [] Wound Image Taken    Wound Care Consulted? Yes    Attending Nurse:  Kathleen Shannon RN/Staff Member:  Yesol     Skin breakdown on under breasts and daniel groins      Report received. Patient arrived via stretcher AAOx4. Pt lying supine in bed. Pt denies pain or any other concerns at this time. Patient oriented to room. Side rails up x2. Bed in the lowest position and bed wheels locked. Call light within reach. Will continue to monitor.    Fall Risk

## 2024-10-16 NOTE — ED PROVIDER NOTE - CONSTITUTIONAL, MLM
Patient presents for the following vaccines: FLUAD. Patient consent obtained by patient. Patient tolerated procedure well at right deltoid. Patient advised to remain in lobby for period of 10 minutes post injection to ensure no reaction. VIS was given to patient.    Lot # [id number]  Exp: 5/02/2025  MFG: [id number]  NDC: 12854-046-58   normal... Well appearing, well nourished, awake, alert, oriented to person, place, time/situation and in no apparent distress.

## 2024-12-09 NOTE — CONSULT NOTE ADULT - ASSESSMENT
Called Lacey regarding scheduling a new patient visit for PCN/Amoxcillin allergy due to pregnancy.   Advised our first available appointment is 3/6/25 at 0800.  She verbalized agreement to plan. Appointment scheduled and aware of prep.  
Assess    CHF  Rapid Afib (R/O MI, hyperthyroid, PE, Lung Cancer with pericardical implant)                  Favor cardiac cause  COPD - mostly restricted  Undiagnosed or Rx'd Lung cancer    Rec    Call Melani Beasley Gold ([facility])    CT Chest  Leg Duplex  Echo  TFT    Neb  02  Noct Bipap  Lovenox  Cardiac enzymes  Procalcitonin
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 Patient said she also got the radiation tattoo in preparation of radiation but has not had the procedure yet.     PET scan 7/31 revealed increased size of RANJAN mass with suspicion for metastatic disease. Patient admitted yesterday for worsening cough and SOB, being treated for PNA with antibiotics and intermittent BiPAP.     Plan  Patient has been turned down for biopsy in the past  Dr Jones recommending IR biopsy if possible at this time.  D/W Dr Jones

## 2025-07-03 NOTE — PROGRESS NOTE ADULT - PROVIDER SPECIALTY LIST ADULT
Patient talking with others in room, assisted when getting up to restroom to aide in stability.    Pulmonology